# Patient Record
Sex: MALE | Race: OTHER | Employment: OTHER | URBAN - METROPOLITAN AREA
[De-identification: names, ages, dates, MRNs, and addresses within clinical notes are randomized per-mention and may not be internally consistent; named-entity substitution may affect disease eponyms.]

---

## 2017-01-16 ENCOUNTER — ANESTHESIA EVENT (OUTPATIENT)
Dept: SURGERY | Facility: HOSPITAL | Age: 52
DRG: 460 | End: 2017-01-16
Payer: COMMERCIAL

## 2017-01-17 ENCOUNTER — APPOINTMENT (OUTPATIENT)
Dept: GENERAL RADIOLOGY | Facility: HOSPITAL | Age: 52
DRG: 460 | End: 2017-01-17
Attending: ORTHOPAEDIC SURGERY
Payer: COMMERCIAL

## 2017-01-17 ENCOUNTER — SURGERY (OUTPATIENT)
Age: 52
End: 2017-01-17

## 2017-01-17 ENCOUNTER — ANESTHESIA (OUTPATIENT)
Dept: SURGERY | Facility: HOSPITAL | Age: 52
DRG: 460 | End: 2017-01-17
Payer: COMMERCIAL

## 2017-01-17 PROBLEM — M53.2X6 LUMBAR SPINE INSTABILITY: Status: ACTIVE | Noted: 2017-01-17

## 2017-01-17 PROCEDURE — 76001 XR OR LUMBAR SPINE (2 VIEWS) WITH >60 MIN C-ARM  (CPT=76001/72100): CPT

## 2017-01-17 PROCEDURE — 72100 X-RAY EXAM L-S SPINE 2/3 VWS: CPT

## 2017-01-17 RX ORDER — MIDAZOLAM HYDROCHLORIDE 1 MG/ML
INJECTION INTRAMUSCULAR; INTRAVENOUS AS NEEDED
Status: DISCONTINUED | OUTPATIENT
Start: 2017-01-17 | End: 2017-01-17 | Stop reason: SURG

## 2017-01-17 RX ORDER — MINERAL OIL AND PETROLATUM 150; 830 MG/G; MG/G
OINTMENT OPHTHALMIC AS NEEDED
Status: DISCONTINUED | OUTPATIENT
Start: 2017-01-17 | End: 2017-01-17 | Stop reason: SURG

## 2017-01-17 RX ORDER — GLYCOPYRROLATE 0.2 MG/ML
INJECTION INTRAMUSCULAR; INTRAVENOUS AS NEEDED
Status: DISCONTINUED | OUTPATIENT
Start: 2017-01-17 | End: 2017-01-17 | Stop reason: SURG

## 2017-01-17 RX ORDER — DEXAMETHASONE SODIUM PHOSPHATE 4 MG/ML
VIAL (ML) INJECTION AS NEEDED
Status: DISCONTINUED | OUTPATIENT
Start: 2017-01-17 | End: 2017-01-17 | Stop reason: SURG

## 2017-01-17 RX ORDER — ONDANSETRON 2 MG/ML
INJECTION INTRAMUSCULAR; INTRAVENOUS AS NEEDED
Status: DISCONTINUED | OUTPATIENT
Start: 2017-01-17 | End: 2017-01-17 | Stop reason: SURG

## 2017-01-17 RX ORDER — NEOSTIGMINE METHYLSULFATE 0.5 MG/ML
INJECTION INTRAVENOUS AS NEEDED
Status: DISCONTINUED | OUTPATIENT
Start: 2017-01-17 | End: 2017-01-17 | Stop reason: SURG

## 2017-01-17 RX ORDER — SUCCINYLCHOLINE CHLORIDE 20 MG/ML
INJECTION INTRAMUSCULAR; INTRAVENOUS AS NEEDED
Status: DISCONTINUED | OUTPATIENT
Start: 2017-01-17 | End: 2017-01-17 | Stop reason: SURG

## 2017-01-17 RX ORDER — PHENYLEPHRINE HCL 10 MG/ML
VIAL (ML) INJECTION AS NEEDED
Status: DISCONTINUED | OUTPATIENT
Start: 2017-01-17 | End: 2017-01-17 | Stop reason: SURG

## 2017-01-17 RX ORDER — SODIUM CHLORIDE 9 MG/ML
INJECTION, SOLUTION INTRAVENOUS CONTINUOUS PRN
Status: DISCONTINUED | OUTPATIENT
Start: 2017-01-17 | End: 2017-01-17 | Stop reason: SURG

## 2017-01-17 RX ORDER — LIDOCAINE HYDROCHLORIDE 10 MG/ML
INJECTION, SOLUTION EPIDURAL; INFILTRATION; INTRACAUDAL; PERINEURAL AS NEEDED
Status: DISCONTINUED | OUTPATIENT
Start: 2017-01-17 | End: 2017-01-17 | Stop reason: SURG

## 2017-01-17 RX ORDER — ROCURONIUM BROMIDE 10 MG/ML
INJECTION, SOLUTION INTRAVENOUS AS NEEDED
Status: DISCONTINUED | OUTPATIENT
Start: 2017-01-17 | End: 2017-01-17 | Stop reason: SURG

## 2017-01-17 RX ADMIN — SODIUM CHLORIDE: 9 INJECTION, SOLUTION INTRAVENOUS at 09:45:00

## 2017-01-17 RX ADMIN — ROCURONIUM BROMIDE 10 MG: 10 INJECTION, SOLUTION INTRAVENOUS at 07:40:00

## 2017-01-17 RX ADMIN — ROCURONIUM BROMIDE 10 MG: 10 INJECTION, SOLUTION INTRAVENOUS at 09:25:00

## 2017-01-17 RX ADMIN — SODIUM CHLORIDE, SODIUM LACTATE, POTASSIUM CHLORIDE, CALCIUM CHLORIDE: 600; 310; 30; 20 INJECTION, SOLUTION INTRAVENOUS at 07:34:00

## 2017-01-17 RX ADMIN — SUCCINYLCHOLINE CHLORIDE 120 MG: 20 INJECTION INTRAMUSCULAR; INTRAVENOUS at 07:41:00

## 2017-01-17 RX ADMIN — DEXAMETHASONE SODIUM PHOSPHATE 4 MG: 4 MG/ML VIAL (ML) INJECTION at 07:48:00

## 2017-01-17 RX ADMIN — GLYCOPYRROLATE 0.95 MG: 0.2 INJECTION INTRAMUSCULAR; INTRAVENOUS at 09:49:00

## 2017-01-17 RX ADMIN — SODIUM CHLORIDE: 9 INJECTION, SOLUTION INTRAVENOUS at 07:47:00

## 2017-01-17 RX ADMIN — ROCURONIUM BROMIDE 30 MG: 10 INJECTION, SOLUTION INTRAVENOUS at 07:48:00

## 2017-01-17 RX ADMIN — MIDAZOLAM HYDROCHLORIDE 2 MG: 1 INJECTION INTRAMUSCULAR; INTRAVENOUS at 07:34:00

## 2017-01-17 RX ADMIN — SODIUM CHLORIDE, SODIUM LACTATE, POTASSIUM CHLORIDE, CALCIUM CHLORIDE: 600; 310; 30; 20 INJECTION, SOLUTION INTRAVENOUS at 09:57:00

## 2017-01-17 RX ADMIN — MINERAL OIL AND PETROLATUM 1 APPLICATION: 150; 830 OINTMENT OPHTHALMIC at 07:40:00

## 2017-01-17 RX ADMIN — ONDANSETRON 4 MG: 2 INJECTION INTRAMUSCULAR; INTRAVENOUS at 09:50:00

## 2017-01-17 RX ADMIN — LIDOCAINE HYDROCHLORIDE 50 MG: 10 INJECTION, SOLUTION EPIDURAL; INFILTRATION; INTRACAUDAL; PERINEURAL at 07:40:00

## 2017-01-17 RX ADMIN — GLYCOPYRROLATE 0.2 MG: 0.2 INJECTION INTRAMUSCULAR; INTRAVENOUS at 07:34:00

## 2017-01-17 RX ADMIN — SODIUM CHLORIDE: 9 INJECTION, SOLUTION INTRAVENOUS at 09:15:00

## 2017-01-17 RX ADMIN — SODIUM CHLORIDE, SODIUM LACTATE, POTASSIUM CHLORIDE, CALCIUM CHLORIDE: 600; 310; 30; 20 INJECTION, SOLUTION INTRAVENOUS at 09:15:00

## 2017-01-17 RX ADMIN — SODIUM CHLORIDE, SODIUM LACTATE, POTASSIUM CHLORIDE, CALCIUM CHLORIDE: 600; 310; 30; 20 INJECTION, SOLUTION INTRAVENOUS at 09:56:00

## 2017-01-17 RX ADMIN — PHENYLEPHRINE HCL 100 MCG: 10 MG/ML VIAL (ML) INJECTION at 09:25:00

## 2017-01-17 RX ADMIN — NEOSTIGMINE METHYLSULFATE 4.8 MG: 0.5 INJECTION INTRAVENOUS at 09:49:00

## 2017-01-17 NOTE — ANESTHESIA PREPROCEDURE EVALUATION
Anesthesia PreOp Note    HPI:     Rajinder Boateng is a 54year old male who presents for preoperative consultation requested by: Oscar Page MD    Date of Surgery: 1/17/2017    Procedure(s):  POSTERIOR LUMBAR LAMINECT SPINAL FUS W/INSTR 1 LEV  Indication: Potassium-HCTZ 100-25 MG Oral Tab TAKE ONE TABLET BY MOUTH ONCE DAILY Disp: 30 tablet Rfl: 5 1/16/2017 at Unknown time   Atorvastatin Calcium 20 MG Oral Tab TAKE ONE TABLET BY MOUTH ONCE DAILY Disp: 90 tablet Rfl: 1 1/16/2017 at Unknown time   Glucose Bloo 12/29/2016   MCV 88.9 12/29/2016   MCH 29.8 12/29/2016   MCHC 33.5 12/29/2016   RDW 13.4 12/29/2016    12/29/2016   MPV 8.6 12/29/2016       Lab Results  Component Value Date   * 12/29/2016   K 4.1 12/29/2016    12/29/2016   CO2 26 12/29 planned.   Jaziel CRENSHAW  1/17/2017 8:08 AM

## 2017-01-17 NOTE — ANESTHESIA POSTPROCEDURE EVALUATION
Patient: Severino Mazariegos    Procedure Summary     Date Anesthesia Start Anesthesia Stop Room / Location    01/17/17 0734 1025 300 [facility] MAIN OR 09 / 300 [facility] MAIN OR       Procedure Diagnosis Surgeon Responsible Provider    POSTERIOR LUMBAR LAMINECT SPINAL FUS W/INSTR 1

## 2017-01-18 ENCOUNTER — APPOINTMENT (OUTPATIENT)
Dept: PHYSICAL THERAPY | Facility: HOSPITAL | Age: 52
DRG: 460 | End: 2017-01-18
Attending: ORTHOPAEDIC SURGERY
Payer: COMMERCIAL

## 2017-01-18 ENCOUNTER — APPOINTMENT (OUTPATIENT)
Dept: GENERAL RADIOLOGY | Facility: HOSPITAL | Age: 52
DRG: 460 | End: 2017-01-18
Attending: ORTHOPAEDIC SURGERY
Payer: COMMERCIAL

## 2017-01-18 PROCEDURE — 72100 X-RAY EXAM L-S SPINE 2/3 VWS: CPT

## 2017-01-23 ENCOUNTER — TELEPHONE (OUTPATIENT)
Dept: CARDIOLOGY UNIT | Facility: HOSPITAL | Age: 52
End: 2017-01-23

## 2017-11-03 ENCOUNTER — HOSPITAL ENCOUNTER (OUTPATIENT)
Dept: GENERAL RADIOLOGY | Age: 52
Discharge: HOME | End: 2017-11-03
Attending: INTERNAL MEDICINE
Payer: COMMERCIAL

## 2017-11-03 DIAGNOSIS — M54.12 CERVICAL RADICULOPATHY: ICD-10-CM

## 2017-11-03 PROCEDURE — 72040 X-RAY EXAM NECK SPINE 2-3 VW: CPT | Performed by: INTERNAL MEDICINE

## 2020-08-26 PROCEDURE — 82306 VITAMIN D 25 HYDROXY: CPT | Performed by: INTERNAL MEDICINE

## 2020-08-26 PROCEDURE — 82550 ASSAY OF CK (CPK): CPT | Performed by: INTERNAL MEDICINE

## 2020-08-26 PROCEDURE — 82043 UR ALBUMIN QUANTITATIVE: CPT | Performed by: INTERNAL MEDICINE

## 2020-08-26 PROCEDURE — 83036 HEMOGLOBIN GLYCOSYLATED A1C: CPT | Performed by: INTERNAL MEDICINE

## 2020-08-26 PROCEDURE — 80061 LIPID PANEL: CPT | Performed by: INTERNAL MEDICINE

## 2020-08-26 PROCEDURE — 80053 COMPREHEN METABOLIC PANEL: CPT | Performed by: INTERNAL MEDICINE

## 2020-08-26 PROCEDURE — 82570 ASSAY OF URINE CREATININE: CPT | Performed by: INTERNAL MEDICINE

## 2020-08-26 PROCEDURE — 84443 ASSAY THYROID STIM HORMONE: CPT | Performed by: INTERNAL MEDICINE

## 2020-08-26 PROCEDURE — 85025 COMPLETE CBC W/AUTO DIFF WBC: CPT | Performed by: INTERNAL MEDICINE

## 2020-11-30 ENCOUNTER — HOSPITAL ENCOUNTER (OUTPATIENT)
Dept: ULTRASOUND IMAGING | Age: 55
Discharge: HOME | End: 2020-11-30
Attending: INTERNAL MEDICINE
Payer: MEDICARE

## 2020-11-30 ENCOUNTER — LAB ENCOUNTER (OUTPATIENT)
Dept: LAB | Age: 55
End: 2020-11-30
Attending: INTERNAL MEDICINE
Payer: MEDICARE

## 2020-11-30 DIAGNOSIS — N18.4 CHRONIC KIDNEY DISEASE, STAGE IV (SEVERE) (HCC): ICD-10-CM

## 2020-11-30 DIAGNOSIS — I10 HYPERTENSION, UNSPECIFIED TYPE: ICD-10-CM

## 2020-11-30 DIAGNOSIS — E11.21 DIABETIC NEPHROPATHY ASSOCIATED WITH TYPE 2 DIABETES MELLITUS (HCC): ICD-10-CM

## 2020-11-30 DIAGNOSIS — E78.5 HYPERLIPIDEMIA, UNSPECIFIED HYPERLIPIDEMIA TYPE: ICD-10-CM

## 2020-11-30 PROCEDURE — 76775 US EXAM ABDO BACK WALL LIM: CPT | Performed by: INTERNAL MEDICINE

## 2020-11-30 PROCEDURE — 80069 RENAL FUNCTION PANEL: CPT

## 2020-11-30 PROCEDURE — 85018 HEMOGLOBIN: CPT

## 2020-11-30 PROCEDURE — 81001 URINALYSIS AUTO W/SCOPE: CPT

## 2020-11-30 PROCEDURE — 36415 COLL VENOUS BLD VENIPUNCTURE: CPT

## 2020-11-30 PROCEDURE — 84156 ASSAY OF PROTEIN URINE: CPT

## 2020-11-30 PROCEDURE — 82306 VITAMIN D 25 HYDROXY: CPT

## 2020-11-30 PROCEDURE — 85014 HEMATOCRIT: CPT

## 2020-11-30 PROCEDURE — 83970 ASSAY OF PARATHORMONE: CPT

## 2020-11-30 PROCEDURE — 82570 ASSAY OF URINE CREATININE: CPT

## 2021-01-13 ENCOUNTER — LAB ENCOUNTER (OUTPATIENT)
Dept: LAB | Age: 56
End: 2021-01-13
Attending: INTERNAL MEDICINE
Payer: MEDICARE

## 2021-01-13 DIAGNOSIS — N18.4 CHRONIC KIDNEY DISEASE, STAGE IV (SEVERE) (HCC): ICD-10-CM

## 2021-01-13 LAB
ALBUMIN SERPL-MCNC: 3.3 G/DL (ref 3.4–5)
ANION GAP SERPL CALC-SCNC: 7 MMOL/L (ref 0–18)
BUN BLD-MCNC: 40 MG/DL (ref 7–18)
BUN/CREAT SERPL: 10.8 (ref 10–20)
CALCIUM BLD-MCNC: 8.3 MG/DL (ref 8.5–10.1)
CHLORIDE SERPL-SCNC: 111 MMOL/L (ref 98–112)
CO2 SERPL-SCNC: 22 MMOL/L (ref 21–32)
CREAT BLD-MCNC: 3.71 MG/DL
GLUCOSE BLD-MCNC: 157 MG/DL (ref 70–99)
HCT VFR BLD AUTO: 39.5 %
HGB BLD-MCNC: 13.1 G/DL
OSMOLALITY SERPL CALC.SUM OF ELEC: 303 MOSM/KG (ref 275–295)
PHOSPHATE SERPL-MCNC: 5.4 MG/DL (ref 2.5–4.9)
POTASSIUM SERPL-SCNC: 3.8 MMOL/L (ref 3.5–5.1)
SODIUM SERPL-SCNC: 140 MMOL/L (ref 136–145)

## 2021-01-13 PROCEDURE — 85014 HEMATOCRIT: CPT

## 2021-01-13 PROCEDURE — 80069 RENAL FUNCTION PANEL: CPT

## 2021-01-13 PROCEDURE — 36415 COLL VENOUS BLD VENIPUNCTURE: CPT

## 2021-01-13 PROCEDURE — 85018 HEMOGLOBIN: CPT

## 2021-02-05 DIAGNOSIS — Z23 NEED FOR VACCINATION: ICD-10-CM

## 2021-02-07 ENCOUNTER — IMMUNIZATION (OUTPATIENT)
Dept: LAB | Age: 56
End: 2021-02-07
Attending: HOSPITALIST
Payer: MEDICARE

## 2021-02-07 DIAGNOSIS — Z23 NEED FOR VACCINATION: Primary | ICD-10-CM

## 2021-02-07 PROCEDURE — 0001A SARSCOV2 VAC 30MCG/0.3ML IM: CPT

## 2021-02-24 ENCOUNTER — LAB ENCOUNTER (OUTPATIENT)
Dept: LAB | Age: 56
End: 2021-02-24
Attending: INTERNAL MEDICINE
Payer: MEDICARE

## 2021-02-24 DIAGNOSIS — N18.4 CHRONIC KIDNEY DISEASE, STAGE IV (SEVERE) (HCC): ICD-10-CM

## 2021-02-24 LAB
ALBUMIN SERPL-MCNC: 3.3 G/DL (ref 3.4–5)
ANION GAP SERPL CALC-SCNC: 9 MMOL/L (ref 0–18)
BUN BLD-MCNC: 37 MG/DL (ref 7–18)
BUN/CREAT SERPL: 9.8 (ref 10–20)
CALCIUM BLD-MCNC: 8.5 MG/DL (ref 8.5–10.1)
CHLORIDE SERPL-SCNC: 113 MMOL/L (ref 98–112)
CO2 SERPL-SCNC: 20 MMOL/L (ref 21–32)
CREAT BLD-MCNC: 3.77 MG/DL
GLUCOSE BLD-MCNC: 95 MG/DL (ref 70–99)
OSMOLALITY SERPL CALC.SUM OF ELEC: 302 MOSM/KG (ref 275–295)
PHOSPHATE SERPL-MCNC: 5 MG/DL (ref 2.5–4.9)
POTASSIUM SERPL-SCNC: 3.7 MMOL/L (ref 3.5–5.1)
SODIUM SERPL-SCNC: 142 MMOL/L (ref 136–145)

## 2021-02-24 PROCEDURE — 36415 COLL VENOUS BLD VENIPUNCTURE: CPT

## 2021-02-24 PROCEDURE — 80069 RENAL FUNCTION PANEL: CPT

## 2021-02-28 ENCOUNTER — IMMUNIZATION (OUTPATIENT)
Dept: LAB | Age: 56
End: 2021-02-28
Attending: HOSPITALIST
Payer: MEDICARE

## 2021-02-28 DIAGNOSIS — Z23 NEED FOR VACCINATION: Primary | ICD-10-CM

## 2021-02-28 PROCEDURE — 0002A SARSCOV2 VAC 30MCG/0.3ML IM: CPT

## 2021-04-08 ENCOUNTER — LAB ENCOUNTER (OUTPATIENT)
Dept: LAB | Age: 56
End: 2021-04-08
Attending: INTERNAL MEDICINE
Payer: MEDICARE

## 2021-04-08 DIAGNOSIS — E11.21 DIABETIC NEPHROPATHY ASSOCIATED WITH TYPE 2 DIABETES MELLITUS (HCC): ICD-10-CM

## 2021-04-08 DIAGNOSIS — E78.5 HYPERLIPIDEMIA, UNSPECIFIED HYPERLIPIDEMIA TYPE: ICD-10-CM

## 2021-04-08 DIAGNOSIS — N18.4 CHRONIC KIDNEY DISEASE, STAGE IV (SEVERE) (HCC): ICD-10-CM

## 2021-04-08 DIAGNOSIS — I10 ESSENTIAL HYPERTENSION, BENIGN: ICD-10-CM

## 2021-04-08 PROCEDURE — 80069 RENAL FUNCTION PANEL: CPT

## 2021-04-08 PROCEDURE — 36415 COLL VENOUS BLD VENIPUNCTURE: CPT

## 2021-05-06 ENCOUNTER — LAB ENCOUNTER (OUTPATIENT)
Dept: LAB | Age: 56
End: 2021-05-06
Attending: INTERNAL MEDICINE
Payer: MEDICARE

## 2021-05-06 DIAGNOSIS — E11.21 DIABETIC NEPHROPATHY ASSOCIATED WITH TYPE 2 DIABETES MELLITUS (HCC): ICD-10-CM

## 2021-05-06 DIAGNOSIS — N25.81 SECONDARY HYPERPARATHYROIDISM OF RENAL ORIGIN (HCC): ICD-10-CM

## 2021-05-06 DIAGNOSIS — D63.1 ANEMIA OF CHRONIC RENAL FAILURE, STAGE 4 (SEVERE) (HCC): ICD-10-CM

## 2021-05-06 DIAGNOSIS — N18.4 CHRONIC KIDNEY DISEASE, STAGE IV (SEVERE) (HCC): ICD-10-CM

## 2021-05-06 DIAGNOSIS — N18.4 ANEMIA OF CHRONIC RENAL FAILURE, STAGE 4 (SEVERE) (HCC): ICD-10-CM

## 2021-05-06 DIAGNOSIS — E78.5 HYPERLIPIDEMIA, UNSPECIFIED HYPERLIPIDEMIA TYPE: ICD-10-CM

## 2021-05-06 DIAGNOSIS — I10 ESSENTIAL HYPERTENSION: ICD-10-CM

## 2021-05-06 PROCEDURE — 85014 HEMATOCRIT: CPT

## 2021-05-06 PROCEDURE — 36415 COLL VENOUS BLD VENIPUNCTURE: CPT

## 2021-05-06 PROCEDURE — 85018 HEMOGLOBIN: CPT

## 2021-05-06 PROCEDURE — 80069 RENAL FUNCTION PANEL: CPT

## 2021-07-09 ENCOUNTER — LAB ENCOUNTER (OUTPATIENT)
Dept: LAB | Age: 56
End: 2021-07-09
Attending: INTERNAL MEDICINE
Payer: MEDICARE

## 2021-07-09 DIAGNOSIS — D64.9 ANEMIA, UNSPECIFIED TYPE: ICD-10-CM

## 2021-07-09 DIAGNOSIS — N18.4 CHRONIC KIDNEY DISEASE, STAGE IV (SEVERE) (HCC): ICD-10-CM

## 2021-07-09 LAB
ALBUMIN SERPL-MCNC: 3.6 G/DL (ref 3.4–5)
ANION GAP SERPL CALC-SCNC: 6 MMOL/L (ref 0–18)
BUN BLD-MCNC: 47 MG/DL (ref 7–18)
BUN/CREAT SERPL: 11.9 (ref 10–20)
CALCIUM BLD-MCNC: 8.3 MG/DL (ref 8.5–10.1)
CHLORIDE SERPL-SCNC: 118 MMOL/L (ref 98–112)
CO2 SERPL-SCNC: 18 MMOL/L (ref 21–32)
CREAT BLD-MCNC: 3.96 MG/DL
GLUCOSE BLD-MCNC: 88 MG/DL (ref 70–99)
HCT VFR BLD AUTO: 36.1 %
HGB BLD-MCNC: 11.7 G/DL
OSMOLALITY SERPL CALC.SUM OF ELEC: 306 MOSM/KG (ref 275–295)
PHOSPHATE SERPL-MCNC: 5.4 MG/DL (ref 2.5–4.9)
POTASSIUM SERPL-SCNC: 3.9 MMOL/L (ref 3.5–5.1)
PTH-INTACT SERPL-MCNC: 106.1 PG/ML (ref 18.5–88)
SODIUM SERPL-SCNC: 142 MMOL/L (ref 136–145)

## 2021-07-09 PROCEDURE — 80069 RENAL FUNCTION PANEL: CPT

## 2021-07-09 PROCEDURE — 85014 HEMATOCRIT: CPT

## 2021-07-09 PROCEDURE — 85018 HEMOGLOBIN: CPT

## 2021-07-09 PROCEDURE — 83970 ASSAY OF PARATHORMONE: CPT

## 2021-07-09 PROCEDURE — 36415 COLL VENOUS BLD VENIPUNCTURE: CPT

## 2021-08-23 ENCOUNTER — LAB ENCOUNTER (OUTPATIENT)
Dept: LAB | Age: 56
End: 2021-08-23
Attending: INTERNAL MEDICINE
Payer: MEDICARE

## 2021-08-23 DIAGNOSIS — I12.9 HYPERTENSIVE CHRONIC KIDNEY DISEASE, UNSPECIFIED CKD STAGE: ICD-10-CM

## 2021-08-23 DIAGNOSIS — D63.1 ANEMIA IN CHRONIC KIDNEY DISEASE, UNSPECIFIED CKD STAGE: ICD-10-CM

## 2021-08-23 DIAGNOSIS — E11.21 TYPE II DIABETES MELLITUS WITH NEPHROPATHY (HCC): ICD-10-CM

## 2021-08-23 DIAGNOSIS — N25.81 SECONDARY HYPERPARATHYROIDISM OF RENAL ORIGIN (HCC): ICD-10-CM

## 2021-08-23 DIAGNOSIS — N18.4 CHRONIC RENAL DISEASE, STAGE IV (HCC): ICD-10-CM

## 2021-08-23 DIAGNOSIS — E87.2 ACIDOSIS: ICD-10-CM

## 2021-08-23 DIAGNOSIS — N18.4 CHRONIC KIDNEY DISEASE, STAGE IV (SEVERE) (HCC): ICD-10-CM

## 2021-08-23 DIAGNOSIS — N18.9 ANEMIA IN CHRONIC KIDNEY DISEASE, UNSPECIFIED CKD STAGE: ICD-10-CM

## 2021-08-23 DIAGNOSIS — E78.5 HYPERLIPIDEMIA, UNSPECIFIED HYPERLIPIDEMIA TYPE: ICD-10-CM

## 2021-08-23 LAB
ALBUMIN SERPL-MCNC: 3.7 G/DL (ref 3.4–5)
ANION GAP SERPL CALC-SCNC: 7 MMOL/L (ref 0–18)
BUN BLD-MCNC: 45 MG/DL (ref 7–18)
CALCIUM BLD-MCNC: 8.3 MG/DL (ref 8.5–10.1)
CHLORIDE SERPL-SCNC: 117 MMOL/L (ref 98–112)
CO2 SERPL-SCNC: 19 MMOL/L (ref 21–32)
CREAT BLD-MCNC: 4.32 MG/DL
GLUCOSE BLD-MCNC: 81 MG/DL (ref 70–99)
HCT VFR BLD AUTO: 36 %
HGB BLD-MCNC: 11.4 G/DL
OSMOLALITY SERPL CALC.SUM OF ELEC: 307 MOSM/KG (ref 275–295)
PHOSPHATE SERPL-MCNC: 4.9 MG/DL (ref 2.5–4.9)
POTASSIUM SERPL-SCNC: 3.9 MMOL/L (ref 3.5–5.1)
SODIUM SERPL-SCNC: 143 MMOL/L (ref 136–145)

## 2021-08-23 PROCEDURE — 36415 COLL VENOUS BLD VENIPUNCTURE: CPT

## 2021-08-23 PROCEDURE — 85018 HEMOGLOBIN: CPT

## 2021-08-23 PROCEDURE — 85014 HEMATOCRIT: CPT

## 2021-08-23 PROCEDURE — 80069 RENAL FUNCTION PANEL: CPT

## 2021-08-26 PROBLEM — H40.9 GLAUCOMA: Status: ACTIVE | Noted: 2021-08-12

## 2021-08-26 PROBLEM — N18.9 CHRONIC KIDNEY DISEASE: Status: ACTIVE | Noted: 2021-07-28

## 2021-08-26 PROBLEM — M54.9 BACK PAIN: Status: ACTIVE | Noted: 2021-08-12

## 2021-10-15 ENCOUNTER — HOSPITAL ENCOUNTER (INPATIENT)
Facility: HOSPITAL | Age: 56
LOS: 2 days | Discharge: HOME | DRG: 291 | End: 2021-10-17
Attending: EMERGENCY MEDICINE | Admitting: INTERNAL MEDICINE
Payer: MEDICARE

## 2021-10-15 ENCOUNTER — APPOINTMENT (OUTPATIENT)
Dept: GENERAL RADIOLOGY | Facility: HOSPITAL | Age: 56
DRG: 291 | End: 2021-10-15
Attending: EMERGENCY MEDICINE
Payer: MEDICARE

## 2021-10-15 ENCOUNTER — APPOINTMENT (OUTPATIENT)
Dept: NUCLEAR MEDICINE | Facility: HOSPITAL | Age: 56
DRG: 291 | End: 2021-10-15
Attending: EMERGENCY MEDICINE
Payer: MEDICARE

## 2021-10-15 ENCOUNTER — LAB ENCOUNTER (OUTPATIENT)
Dept: LAB | Age: 56
End: 2021-10-15
Attending: INTERNAL MEDICINE
Payer: MEDICARE

## 2021-10-15 DIAGNOSIS — D63.1 ANEMIA IN CHRONIC KIDNEY DISEASE, UNSPECIFIED CKD STAGE: ICD-10-CM

## 2021-10-15 DIAGNOSIS — I12.0 HYPERTENSIVE KIDNEY DISEASE WITH CHRONIC KIDNEY DISEASE STAGE V (HCC): ICD-10-CM

## 2021-10-15 DIAGNOSIS — N25.81 SECONDARY HYPERPARATHYROIDISM OF RENAL ORIGIN (HCC): ICD-10-CM

## 2021-10-15 DIAGNOSIS — N18.5 HYPERTENSIVE KIDNEY DISEASE WITH CHRONIC KIDNEY DISEASE STAGE V (HCC): ICD-10-CM

## 2021-10-15 DIAGNOSIS — E11.21 DIABETES MELLITUS WITH NEPHROPATHY (HCC): ICD-10-CM

## 2021-10-15 DIAGNOSIS — Z95.1 HX OF CABG: ICD-10-CM

## 2021-10-15 DIAGNOSIS — N18.9 ANEMIA IN CHRONIC KIDNEY DISEASE, UNSPECIFIED CKD STAGE: ICD-10-CM

## 2021-10-15 DIAGNOSIS — J90 PLEURAL EFFUSION: Primary | ICD-10-CM

## 2021-10-15 DIAGNOSIS — N18.5 CHRONIC KIDNEY DISEASE, STAGE V (HCC): ICD-10-CM

## 2021-10-15 DIAGNOSIS — E78.5 HYPERLIPIDEMIA, UNSPECIFIED HYPERLIPIDEMIA TYPE: ICD-10-CM

## 2021-10-15 PROCEDURE — 71045 X-RAY EXAM CHEST 1 VIEW: CPT | Performed by: EMERGENCY MEDICINE

## 2021-10-15 PROCEDURE — 78582 LUNG VENTILAT&PERFUS IMAGING: CPT | Performed by: EMERGENCY MEDICINE

## 2021-10-15 PROCEDURE — 99222 1ST HOSP IP/OBS MODERATE 55: CPT | Performed by: INTERNAL MEDICINE

## 2021-10-15 RX ORDER — ASPIRIN 81 MG/1
81 TABLET ORAL DAILY
Status: DISCONTINUED | OUTPATIENT
Start: 2021-10-15 | End: 2021-10-17

## 2021-10-15 RX ORDER — CLOPIDOGREL BISULFATE 75 MG/1
75 TABLET ORAL DAILY
Status: DISCONTINUED | OUTPATIENT
Start: 2021-10-15 | End: 2021-10-17

## 2021-10-15 RX ORDER — CLOPIDOGREL BISULFATE 75 MG/1
75 TABLET ORAL DAILY
COMMUNITY

## 2021-10-15 RX ORDER — METOPROLOL SUCCINATE 50 MG/1
50 TABLET, EXTENDED RELEASE ORAL
Status: DISCONTINUED | OUTPATIENT
Start: 2021-10-16 | End: 2021-10-16

## 2021-10-15 RX ORDER — DOXYCYCLINE HYCLATE 100 MG/1
100 CAPSULE ORAL 2 TIMES DAILY
COMMUNITY
End: 2021-11-05 | Stop reason: ALTCHOICE

## 2021-10-15 RX ORDER — ROSUVASTATIN CALCIUM 10 MG/1
10 TABLET, COATED ORAL NIGHTLY
Status: DISCONTINUED | OUTPATIENT
Start: 2021-10-15 | End: 2021-10-17

## 2021-10-15 RX ORDER — BUMETANIDE 0.25 MG/ML
1 INJECTION, SOLUTION INTRAMUSCULAR; INTRAVENOUS
Status: DISCONTINUED | OUTPATIENT
Start: 2021-10-15 | End: 2021-10-17

## 2021-10-15 RX ORDER — DOCUSATE SODIUM 100 MG/1
100 CAPSULE, LIQUID FILLED ORAL 2 TIMES DAILY
COMMUNITY

## 2021-10-15 RX ORDER — DOXYCYCLINE HYCLATE 100 MG/1
100 CAPSULE ORAL EVERY 12 HOURS SCHEDULED
Status: DISCONTINUED | OUTPATIENT
Start: 2021-10-15 | End: 2021-10-17

## 2021-10-15 RX ORDER — ENOXAPARIN SODIUM 100 MG/ML
80 INJECTION SUBCUTANEOUS ONCE
Status: COMPLETED | OUTPATIENT
Start: 2021-10-15 | End: 2021-10-15

## 2021-10-15 NOTE — ED PROVIDER NOTES
Patient Seen in: [facility] AND [facility] Emergency Department    History   Patient presents with:  Swelling Edema  Difficulty Breathing      HPI    61-year-old male presents the ER with complaint of shortness of breath.   Patient states he been having shortness o ED Triage Vitals [10/15/21 1344]   /80   Pulse 108   Resp 25   Temp 97.8 °F (36.6 °C)   Temp src    SpO2 100 %   O2 Device None (Room air)       All measures to prevent infection transmission during my interaction with the patient were taken.  The Labs Reviewed   BASIC METABOLIC PANEL (8) - Abnormal; Notable for the following components:       Result Value    Glucose 251 (*)     Chloride 114 (*)     CO2 18.0 (*)     BUN 49 (*)     Creatinine 4.11 (*)     Calcium, Total 8.4 (*)     Calculated Osmol in time range)         MDM      10/15/21  1344 10/15/21  1400 10/15/21  1430   BP: 145/80 (!) 163/97 144/88   Pulse: 108 106 106   Resp: 25 26 22   Temp: 97.8 °F (36.6 °C)     SpO2: 100% 100% 99%   Weight: 88.5 kg       *I personally reviewed and interpret Admission  Date Reviewed: 8/26/2021          ICD-10-CM Noted POA    * (Principal) Pleural effusion J90 10/15/2021 Unknown

## 2021-10-15 NOTE — CONSULTS
[facility] - [facility]    Report of Consultation    Kemal Goff Patient Status:  Emergency    1965 MRN [id number]   Location [address] Attending Leatha Echeverria,    Hosp Day # 0 PCP Holland Best MD     Date of mg, Subcutaneous, Once      (Not in a hospital admission)      Allergies  No Known Allergies    Review of Systems:   Constitutional: denies fevers, chills, weakness, fatigue, recent illness  HEENT: denies headache, sore throat, vision loss  Cardio: denies 2. Post coronary artery bypass. No acute CHF. Dictated by (CST): Randal Mercado MD on 10/15/2021 at 2:18 PM     Finalized by (CST): Randal Mercado MD on 10/15/2021 at 2:20 PM            Assessment   1. Dyspnea  2. Status post CABG  3.   Coronary arter

## 2021-10-15 NOTE — ED QUICK NOTES
Orders for admission, patient is aware of plan and ready to go upstairs. LOC:AOx3, ambulatory with assistance. Denies pain.   VQ scan scheduled for this evening    ACCESS:20g L AC PIV    Rapid COVID is negative    Any questions, please call ED RN Cheri Bryant

## 2021-10-15 NOTE — CONSULTS
[address] Patient Status:  Emergency    1965 MRN [id number]   Location [address] Attending Anna Cardoso DO   Hosp Day # 0 PCP Jerusalem MD Caden     Date of Admi compliant with all of his medications. Denies any fevers or chills. Also reports having increasing lower extremity ankle edema. No nausea or vomiting. No diaphoresis.   On presentation was found to have elevated proBNP levels 3571, cardiology was [facility] Exam:   Blood pressure (!) 147/99, pulse 105, temperature 97.8 °F (36.6 °C), resp. rate 17, weight 195 lb (88.5 kg), SpO2 100 %. Scheduled Meds:   • enoxaparin  80 mg Subcutaneous Once         Physical Exam:    General: Alert and oriented.  No apparent d

## 2021-10-15 NOTE — ED INITIAL ASSESSMENT (HPI)
Patient aox3 to ed via private vehicle s/p cardiac bypass at [facility] x 2 weeks ago hx of renal failure.  Patient was told to come to ed for further eval +ble edema noted +sob denies chest pain

## 2021-10-16 ENCOUNTER — APPOINTMENT (OUTPATIENT)
Dept: GENERAL RADIOLOGY | Facility: HOSPITAL | Age: 56
DRG: 291 | End: 2021-10-16
Attending: INTERNAL MEDICINE
Payer: MEDICARE

## 2021-10-16 ENCOUNTER — APPOINTMENT (OUTPATIENT)
Dept: CV DIAGNOSTICS | Facility: HOSPITAL | Age: 56
DRG: 291 | End: 2021-10-16
Attending: INTERNAL MEDICINE
Payer: MEDICARE

## 2021-10-16 ENCOUNTER — APPOINTMENT (OUTPATIENT)
Dept: ULTRASOUND IMAGING | Facility: HOSPITAL | Age: 56
DRG: 291 | End: 2021-10-16
Attending: INTERNAL MEDICINE
Payer: MEDICARE

## 2021-10-16 PROCEDURE — 93306 TTE W/DOPPLER COMPLETE: CPT | Performed by: INTERNAL MEDICINE

## 2021-10-16 PROCEDURE — 71045 X-RAY EXAM CHEST 1 VIEW: CPT | Performed by: INTERNAL MEDICINE

## 2021-10-16 PROCEDURE — 32555 ASPIRATE PLEURA W/ IMAGING: CPT | Performed by: INTERNAL MEDICINE

## 2021-10-16 PROCEDURE — 0W9B3ZZ DRAINAGE OF LEFT PLEURAL CAVITY, PERCUTANEOUS APPROACH: ICD-10-PCS | Performed by: INTERNAL MEDICINE

## 2021-10-16 PROCEDURE — 71046 X-RAY EXAM CHEST 2 VIEWS: CPT | Performed by: INTERNAL MEDICINE

## 2021-10-16 PROCEDURE — 99233 SBSQ HOSP IP/OBS HIGH 50: CPT | Performed by: INTERNAL MEDICINE

## 2021-10-16 RX ORDER — METOPROLOL TARTRATE 5 MG/5ML
5 INJECTION INTRAVENOUS ONCE
Status: COMPLETED | OUTPATIENT
Start: 2021-10-16 | End: 2021-10-16

## 2021-10-16 RX ORDER — HYDRALAZINE HYDROCHLORIDE 25 MG/1
25 TABLET, FILM COATED ORAL EVERY 8 HOURS SCHEDULED
Status: DISCONTINUED | OUTPATIENT
Start: 2021-10-16 | End: 2021-10-17

## 2021-10-16 RX ORDER — METOPROLOL SUCCINATE 50 MG/1
50 TABLET, EXTENDED RELEASE ORAL 2 TIMES DAILY
Status: DISCONTINUED | OUTPATIENT
Start: 2021-10-16 | End: 2021-10-17

## 2021-10-16 RX ORDER — HYDRALAZINE HYDROCHLORIDE 25 MG/1
25 TABLET, FILM COATED ORAL EVERY 8 HOURS SCHEDULED
Qty: 90 TABLET | Refills: 3 | Status: SHIPPED | OUTPATIENT
Start: 2021-10-16

## 2021-10-16 RX ORDER — HYDROCODONE BITARTRATE AND ACETAMINOPHEN 10; 325 MG/1; MG/1
1 TABLET ORAL EVERY 6 HOURS PRN
Status: DISCONTINUED | OUTPATIENT
Start: 2021-10-16 | End: 2021-10-17

## 2021-10-16 RX ORDER — BRIMONIDINE TARTRATE 2 MG/ML
1 SOLUTION/ DROPS OPHTHALMIC EVERY 8 HOURS SCHEDULED
Status: DISCONTINUED | OUTPATIENT
Start: 2021-10-16 | End: 2021-10-17

## 2021-10-16 RX ORDER — TIMOLOL MALEATE 5 MG/ML
1 SOLUTION/ DROPS OPHTHALMIC 2 TIMES DAILY
Status: DISCONTINUED | OUTPATIENT
Start: 2021-10-16 | End: 2021-10-17

## 2021-10-16 RX ORDER — METOPROLOL SUCCINATE 50 MG/1
50 TABLET, EXTENDED RELEASE ORAL 2 TIMES DAILY
Status: DISCONTINUED | OUTPATIENT
Start: 2021-10-16 | End: 2021-10-16

## 2021-10-16 RX ORDER — POTASSIUM CHLORIDE 20 MEQ/1
20 TABLET, EXTENDED RELEASE ORAL ONCE
Status: COMPLETED | OUTPATIENT
Start: 2021-10-16 | End: 2021-10-16

## 2021-10-16 RX ORDER — HYDRALAZINE HYDROCHLORIDE 25 MG/1
25 TABLET, FILM COATED ORAL EVERY 8 HOURS SCHEDULED
Status: DISCONTINUED | OUTPATIENT
Start: 2021-10-16 | End: 2021-10-16

## 2021-10-16 RX ORDER — LATANOPROST 50 UG/ML
1 SOLUTION/ DROPS OPHTHALMIC NIGHTLY
Status: DISCONTINUED | OUTPATIENT
Start: 2021-10-16 | End: 2021-10-17

## 2021-10-16 NOTE — PLAN OF CARE
Reza Frostx4, [place], complaint of SOB and chest pain, CABG Sept 20, plan for VQ scan this evening, episode of hypoglycemia when arrived to unit.       Problem: Patient Centered Care  Goal: Patient preferences are identified and integrated in the patient's plan of monitor vital signs, obtain 12 lead EKG if indicated  - Evaluate effectiveness of antiarrhythmic and heart rate control medications as ordered  - Initiate emergency measures for life threatening arrhythmias  - Monitor electrolytes and administer replacemen

## 2021-10-16 NOTE — PLAN OF CARE
Reza Aox4, up independently, RA, thoracentesis bedside, continue IV bumex. Unable to keep accurate output as pt has been unable to use the urinal consistently. Hat placed in toilet and pt educated on importance of output monitoring.        Problem: Patie stability  Description: INTERVENTIONS:  - Monitor vital signs, rhythm, and trends  - Monitor for bleeding, hypotension and signs of decreased cardiac output  - Evaluate effectiveness of vasoactive medications to optimize hemodynamic stability  - Monitor ar

## 2021-10-16 NOTE — PROGRESS NOTES
10/16/21 1125   Clinical Encounter Type   Visited With Patient and family together   Routine Visit Introduction   Continue Visiting Yes   Referral From Nurse   Referral To [address] Needs Prayer   Spiritual Requests Dur

## 2021-10-16 NOTE — PROGRESS NOTES
[facility]  Report of Consultation    Kae Maurice Patient Status:  Inpatient    1965 MRN [id number]   Location [facility] 3W/SW Attending Tracey Clark MD   Hosp Day # 1 PCP Barb Peralta MD     Reason for Consultation:  Progressive were reviewed and are negative except as described above in HPI. Physical Exam:  Blood pressure (!) 140/94, pulse 109, temperature 98.6 °F (37 °C), temperature source Oral, resp. rate 18, weight 189 lb (85.7 kg), SpO2 98 %.   Temp (24hrs), Av.5 °F (3 #3 Endoscopic harvest of the GSV    CXR: 10/16/21:   1.  Interval worsening in the chest with worsening opacification of the left lung base with increasing moderate-sized left pleural effusion and worsening opacification suggesting possible left basal pne inflammatory markers. Increase Toprol to  50 mg Bid. Hgb stable at 9.6.

## 2021-10-16 NOTE — PLAN OF CARE
Pt is A&Ox4. No acute changes. Plan is to continue to diurese.    Problem: Patient Centered Care  Goal: Patient preferences are identified and integrated in the patient's plan of care  Description: Interventions:  - What would you like us to know as we care shortness of breathe    Interventions:   - medications  - I/o  - See additional Care Plan goals for specific interventions  10/16/2021 0648 by Randy Luna  Outcome: Progressing  10/16/2021 0648 by Randy Luna  Outcome: Progressing  10/16/2021 064

## 2021-10-16 NOTE — PROGRESS NOTES
[facility]    Progress Note    Iglesia Bertrand Patient Status:  Inpatient    1965 MRN [id number]   Location [facility] 3W/SW Attending Bernarda Butler MD   Hosp Day # 1 PCP Helena Xiong MD     Subjective:     Constitutional: lung which appears diminutive. This is consistent with decreased left lung volume on current chest x-ray. 2. Low probability study for pulmonary embolus.     Dictated by (CST): Darrick Rios MD on 10/15/2021 at 7:12 PM     Finalized by (CST): Cortez Flores

## 2021-10-16 NOTE — H&P
[facility]    History and Physical    Marlyse Ends Patient Status:  Inpatient    1965 MRN [id number]   Location [facility] 3W/SW Attending Blessing Blankenship MD   Hosp Day # 1 PCP Radha Hamlin MD     Date:  10/16/2021  Date of tablet by mouth every 6 (six) hours as needed for Pain. ASPIRIN LOW DOSE 81 MG Oral Tab EC, Take 81 mg by mouth daily. diazePAM (VALIUM) 5 MG Oral Tab, Take 1 tablet (5 mg total) by mouth every 6 (six) hours as needed for Anxiety.   mupirocin 2 % External round, and reactive to light. Conjunctivae are normal.   Neck: Neck supple. No JVD present. Cardiovascular: Regular rhythm and normal heart sounds. Exam reveals no friction rub. Tachy in the 110's Edema (trace) present.   Pulmonary/Chest: Effort makayla EKG 12 Lead    Result Date: 10/15/2021  ECG Report  Interpretation  -------------------------- Sinus Tachycardia Low voltage in precordial leads.  - Negative T-waves May be normal -consider acute process.  ABNORMAL When compared with ECG of 12/29/2016

## 2021-10-16 NOTE — PROCEDURES
[facility]  Procedure Note  10/16/21    Travis Asp Patient Status:  Inpatient    1965 MRN [id number]   Location [facility] 3W/SW Attending Idania Key MD   Hosp Day # 1 PCP Judith Avelar MD     Procedure: Left-sided ult

## 2021-10-17 VITALS
OXYGEN SATURATION: 100 % | RESPIRATION RATE: 16 BRPM | DIASTOLIC BLOOD PRESSURE: 84 MMHG | WEIGHT: 183 LBS | SYSTOLIC BLOOD PRESSURE: 127 MMHG | TEMPERATURE: 97 F | HEART RATE: 108 BPM | BODY MASS INDEX: 32 KG/M2

## 2021-10-17 PROCEDURE — 99232 SBSQ HOSP IP/OBS MODERATE 35: CPT | Performed by: INTERNAL MEDICINE

## 2021-10-17 PROCEDURE — 99233 SBSQ HOSP IP/OBS HIGH 50: CPT | Performed by: INTERNAL MEDICINE

## 2021-10-17 RX ORDER — POTASSIUM CHLORIDE 20 MEQ/1
20 TABLET, EXTENDED RELEASE ORAL ONCE
Status: COMPLETED | OUTPATIENT
Start: 2021-10-17 | End: 2021-10-17

## 2021-10-17 RX ORDER — DIAZEPAM 5 MG/1
5 TABLET ORAL EVERY 6 HOURS PRN
Status: DISCONTINUED | OUTPATIENT
Start: 2021-10-17 | End: 2021-10-17

## 2021-10-17 NOTE — PROGRESS NOTES
[facility]     Cardiology Progress Note    Ines Ratliff Patient Status:  Inpatient    1965 MRN [id number]   Location [facility] 3W/SW Attending Chidi Cazares MD   Hosp Day # 2 PCP Rosemarie Negrete MD       SUBJECTIVE:  No card abdominal pain, nausea, vomiting or change in bowel habits. : Denies any difficulty with urination. Neuro:  Denies any headaches, focal weakness or paresthesia. All other systems reviewed and are negative.       Labs:     Recent Labs   Lab 10/15/21 Cardiopulmonary bypass with mild hypothermia. #3 Endoscopic harvest of the GSV     CXR: 10/16/21:   1.  Interval worsening in the chest with worsening opacification of the left lung base with increasing moderate-sized left pleural effusion and worsening o NSAID with CKD. Toprol 50 mg.   · HTN: Controlled. · Dyslipidemia: Crestor 10 mg  · CKD Stage 4: Cr:  4.11  GFR: 15.  Pre transplant  Work up at [facility]. · Anemia: 9.6 post operative stable. Summary:   Remains ST with HR's in the 's.  VQ negative

## 2021-10-17 NOTE — PROGRESS NOTES
[facility]    Progress Note      Assessment and Plan:   1. Postoperative pleural effusion status post large-volume thoracentesis–breathing much better today. Off oxygen.   Chest x-ray looks good postoperatively except for minimal basilar a

## 2021-10-17 NOTE — DISCHARGE SUMMARY
DISCHARGE SUMMARY     Corona Dalton Patient Status:  Inpatient    1965 MRN [id number]   Location [facility] 3W/SW Attending Sarthak Nelson MD   Hosp Day # 2 PCP Kristy Mas MD     Date of Admission: 10/15/2021  Date of Discharge: 10/17/21 taking these medications      Instructions Prescription details   hydrALAZINE 25 MG Tabs  Commonly known as: APRESOLINE      Take 1 tablet (25 mg total) by mouth every 8 (eight) hours.    Quantity: 90 tablet  Refills: 3        CHANGE how you take these medi Quantity: 100 strip  Refills: 5     HYDROcodone-acetaminophen  MG Tabs  Commonly known as: Norco      Take 1 tablet by mouth every 6 (six) hours as needed for Pain.    Quantity: 90 tablet  Refills: 0     metoprolol succinate 50 MG Tb24  Commonly known Tab  TAKE 1 TABLET(10 MG) BY MOUTH EVERY NIGHT    Cholecalciferol 125 MCG (5000 UT) Oral Tab  Take 1 tablet by mouth. acetaZOLAMIDE 250 MG Oral Tab  Take 0.5 tablets by mouth in the morning, at noon, and at bedtime.      SITagliptin Phosphate 25 MG Oral

## 2021-10-17 NOTE — PROGRESS NOTES
[facility]    Progress Note    Beatriz Ruby Patient Status:  Inpatient    1965 MRN [id number]   Location [facility] 3W/SW Attending Luis Arechiga MD   Hosp Day # 2 PCP Becky Liu MD     Subjective:     Constitutional: 10/16/2021 at 9:01 AM     Finalized by (CST): Hans Livingston MD on 10/16/2021 at 9:03 AM          US THORACENTESIS GUIDED LEFT (CPT=32555)    Result Date: 10/16/2021  CONCLUSION:  1. Left pleural effusion. Thoracentesis by Dr. Eduardo Nolan.     Dictated by (CST): changes have occurred Electronically signed on 10/15/2021 at 16:05 by Mariely Parrish MD      Assessment & Plan:      Feels better  all     Pleural effusion/CHF- s/p pleural tap by pulmo,      Admit/ iv bumex/ pulmonary and cardiology consult     CAD/Hx

## 2021-10-17 NOTE — PLAN OF CARE
Pt slept throughout the night. Denies pain. Wife is at bedside. Fall precautions in place, call light within reach.       Problem: Patient Centered Care  Goal: Patient preferences are identified and integrated in the patient's plan of care  Description: Int effectiveness of vasoactive medications to optimize hemodynamic stability  - Monitor arterial and/or venous puncture sites for bleeding and/or hematoma  - Assess quality of pulses, skin color and temperature  - Assess for signs of decreased coronary artery

## 2021-10-18 NOTE — PAYOR COMM NOTE
--------------  ADMISSION REVIEW     [facility]  Subscriber #:  [id number]  Authorization [id number]     ED Provider Notes        History   Patient presents with:  Swelling Edema  Difficulty Breathing    HPI    24-year-old male presents the Palpations: Abdomen is soft. Musculoskeletal:         General: Normal range of motion. Cervical back: Normal range of motion and neck supple. Skin:     General: Skin is warm and dry. Comments: Surgical wound on chest clean dry and intact.    Thony Farmer 80 MG/0.8ML injection 80 mg (has no administration in time range)         MDM      10/15/21  1344 10/15/21  1400 10/15/21  1430   BP: 145/80 (!) 163/97 144/88   Pulse: 108 106 106   Resp: 25 26 22   Temp: 97.8 °F (36.6 °C)     SpO2: 100% 100% 99%   Weight: patient had cardiac bypass surgery. History reviewed.  Initial work up with chf/fluid overload and left pleural effusion with sinus tachycardia    Physical Exam:   Vital Signs:  Blood pressure (!) 140/94, pulse 109, temperature 98.6 °F (37 °C), temperature probability study for pulmonary embolus.     Dictated by (CST): Teresita Samuel MD on 10/15/2021 at 7:12 PM     Finalized by (CST): Teresita Samuel MD on 10/15/2021 at 7:14 PM          EKG 12 Lead    Result Date: 10/15/2021  ECG Report  Inte doing well check 2D echocardiogram with Doppler previous EF 50 to 55% at [facility]. Midline scar healing doing well continue aspirin 81 mg daily and Plavix 75 mg daily. 10/15 PULM    Assessment   1. Dyspnea  2. Status post CABG  3.   Coronary artery dis ultrasound-guided thoracentesis     Pre-Procedure Diagnosis: Pleural effusion     Post-Procedure Diagnosis: 1200 cc of serosanguineous fluid aspirated         10/17 CARDS    Assessment/Plan:  · CAD: 9/28/21: CABG x 3 (LIMA-LAD, SV-D1, SV-OM) Dr. Jenny Santos 10/16/2021     CREATSERUM 4.11 (H) 10/17/2021     BUN 54 (H) 10/17/2021      10/17/2021     K 3.6 10/17/2021      (H) 10/17/2021     CO2 14.0 (L) 10/17/2021      (H) 10/17/2021     CA 8.3 (L) 10/17/2021             MEDICATIONS ADMINISTER 10/17/2021    10/17/2021 0843 Given 1 drop Both Eyes Monica Ayoub, LUISA            10/17/21 0841 97.4 °F (36.3 °C) 115 16 102/69 96 % — None (Room air)       10/16/21 2121 99.1 °F (37.3 °C) 114 20 159/92 Abnormal        10/16/21 0942 98.6 °F (37 °C) 11

## 2021-10-22 ENCOUNTER — TELEPHONE (OUTPATIENT)
Dept: PULMONOLOGY | Facility: CLINIC | Age: 56
End: 2021-10-22

## 2021-10-22 DIAGNOSIS — J90 PLEURAL EFFUSION: Primary | ICD-10-CM

## 2021-10-22 NOTE — TELEPHONE ENCOUNTER
Pt requesting hospital follow up appt in two weeks. Pt was discharged on 10/17/21 and was seen by Dr. Gonzalo Roy during hospital stay.  Please call [phone]

## 2021-10-25 NOTE — TELEPHONE ENCOUNTER
Pt was discharged from [facility] on 10/17/21 w/ instructions to f/u in 2 wks w/ Dr. Jeimy Richardson. Dr. Jeimy Richardson- do you want to add pt to your schedule?

## 2021-10-25 NOTE — PAYOR COMM NOTE
Discharge Notification    Patient Name: Corinne Choiguy Baez [place] [facility]  [place]scriber #: [id number]  Authorization Number: [id number]  Admit Date/Time: 10/15/2021 1:46 PM  Discharge Date/Time: 10/17/2021 4:37 PM

## 2021-10-26 NOTE — TELEPHONE ENCOUNTER
Okay to schedule for sometime next week. I have ordered chest x-ray which she can have done prior to coming in seeing me.

## 2021-10-26 NOTE — TELEPHONE ENCOUNTER
Pt stopped  At desk- he is anxious to get appt sched for hosp fu- pt also asking if chest xray needed before appt?   Pl call pt

## 2021-10-26 NOTE — CDS QUERY
.How to Answer this Query    1.) Click \"Edit\" button on the toolbar.  2.) Type an \"X\" in the bracket for the diagnosis that applies. (You may also add additional clinical details as you feel necessary to substantiate your response).    3.) Finally click 1 diastolic dysfunction). If you have any questions, please contact Clinical :  Sabrina García RN at [phone]     Thank You!      THIS FORM IS A PERMANENT PART OF THE MEDICAL RECORD

## 2021-10-27 NOTE — TELEPHONE ENCOUNTER
Informed pt of Dr. Donell Fishman orders below. He accepted appt 11/5 10:50 am [facility]. Explained to come about 1-1.5 hours early for CXR, no appt necessary, rx in computer system, & he may contact Central Scheduling to schedule if he wishes.  Central Scheduling's

## 2021-11-03 ENCOUNTER — LAB ENCOUNTER (OUTPATIENT)
Dept: LAB | Age: 56
End: 2021-11-03
Attending: INTERNAL MEDICINE
Payer: MEDICARE

## 2021-11-03 DIAGNOSIS — N18.9 ANEMIA IN CHRONIC KIDNEY DISEASE, UNSPECIFIED CKD STAGE: ICD-10-CM

## 2021-11-03 DIAGNOSIS — D63.1 ANEMIA IN CHRONIC KIDNEY DISEASE, UNSPECIFIED CKD STAGE: ICD-10-CM

## 2021-11-03 DIAGNOSIS — N18.5 CHRONIC KIDNEY DISEASE, STAGE V (HCC): ICD-10-CM

## 2021-11-03 DIAGNOSIS — J90 PLEURAL EFFUSION: ICD-10-CM

## 2021-11-03 PROCEDURE — 36415 COLL VENOUS BLD VENIPUNCTURE: CPT

## 2021-11-03 PROCEDURE — 80069 RENAL FUNCTION PANEL: CPT

## 2021-11-03 PROCEDURE — 85025 COMPLETE CBC W/AUTO DIFF WBC: CPT

## 2021-11-05 ENCOUNTER — OFFICE VISIT (OUTPATIENT)
Dept: PULMONOLOGY | Facility: CLINIC | Age: 56
End: 2021-11-05
Payer: MEDICARE

## 2021-11-05 ENCOUNTER — HOSPITAL ENCOUNTER (OUTPATIENT)
Dept: GENERAL RADIOLOGY | Facility: HOSPITAL | Age: 56
Discharge: HOME | End: 2021-11-05
Attending: INTERNAL MEDICINE
Payer: MEDICARE

## 2021-11-05 VITALS
HEART RATE: 102 BPM | HEIGHT: 61 IN | BODY MASS INDEX: 36.25 KG/M2 | WEIGHT: 192 LBS | RESPIRATION RATE: 18 BRPM | DIASTOLIC BLOOD PRESSURE: 67 MMHG | SYSTOLIC BLOOD PRESSURE: 109 MMHG | OXYGEN SATURATION: 100 %

## 2021-11-05 DIAGNOSIS — J90 PLEURAL EFFUSION, LEFT: Primary | ICD-10-CM

## 2021-11-05 DIAGNOSIS — J90 PLEURAL EFFUSION: ICD-10-CM

## 2021-11-05 PROCEDURE — 3078F DIAST BP <80 MM HG: CPT | Performed by: INTERNAL MEDICINE

## 2021-11-05 PROCEDURE — 3074F SYST BP LT 130 MM HG: CPT | Performed by: INTERNAL MEDICINE

## 2021-11-05 PROCEDURE — 71046 X-RAY EXAM CHEST 2 VIEWS: CPT | Performed by: INTERNAL MEDICINE

## 2021-11-05 PROCEDURE — 99213 OFFICE O/P EST LOW 20 MIN: CPT | Performed by: INTERNAL MEDICINE

## 2021-11-05 PROCEDURE — 3008F BODY MASS INDEX DOCD: CPT | Performed by: INTERNAL MEDICINE

## 2021-11-05 RX ORDER — ATORVASTATIN CALCIUM 80 MG/1
80 TABLET, FILM COATED ORAL DAILY
COMMUNITY
Start: 2021-10-13 | End: 2021-11-24

## 2021-11-05 NOTE — PROGRESS NOTES
Referring Physician  Aviva Walker MD    History of Present Illness  Patient presents today for follow-up visit to pulmonary clinic. Hospitalized with evidence of postoperative left pleural effusion underwent thoracentesis at that time.   Denies any signi External Ointment, APPLY TOPICALLY TO THE AFFECTED AREA THREE TIMES DAILY, Disp: 30 g, Rfl: 2  clopidogrel 75 MG Oral Tab, Take 75 mg by mouth daily.  (Patient not taking: Reported on 11/5/2021), Disp: , Rfl:   Glucose Blood (Domonique Mike) In Vitro Strip,

## 2021-11-08 PROBLEM — E66.01 MORBID (SEVERE) OBESITY DUE TO EXCESS CALORIES (HCC): Status: ACTIVE | Noted: 2021-11-08

## 2021-11-24 ENCOUNTER — OFFICE VISIT (OUTPATIENT)
Dept: WOUND CARE | Facility: HOSPITAL | Age: 56
End: 2021-11-24
Attending: CLINICAL NURSE SPECIALIST
Payer: MEDICARE

## 2021-11-24 VITALS
DIASTOLIC BLOOD PRESSURE: 72 MMHG | OXYGEN SATURATION: 99 % | TEMPERATURE: 98 F | WEIGHT: 190 LBS | RESPIRATION RATE: 17 BRPM | BODY MASS INDEX: 35.87 KG/M2 | HEIGHT: 61 IN | HEART RATE: 110 BPM | SYSTOLIC BLOOD PRESSURE: 132 MMHG

## 2021-11-24 DIAGNOSIS — T81.31XA SURGICAL WOUND DEHISCENCE, INITIAL ENCOUNTER: Primary | ICD-10-CM

## 2021-11-24 DIAGNOSIS — S81.802A WOUND OF LEFT LOWER EXTREMITY, INITIAL ENCOUNTER: ICD-10-CM

## 2021-11-24 DIAGNOSIS — E11.8 TYPE 2 DIABETES MELLITUS WITH COMPLICATION, WITHOUT LONG-TERM CURRENT USE OF INSULIN (HCC): ICD-10-CM

## 2021-11-24 PROCEDURE — 99215 OFFICE O/P EST HI 40 MIN: CPT | Performed by: CLINICAL NURSE SPECIALIST

## 2021-11-24 PROCEDURE — 97597 DBRDMT OPN WND 1ST 20 CM/<: CPT | Performed by: CLINICAL NURSE SPECIALIST

## 2021-11-24 NOTE — PROGRESS NOTES
Subjective   Luis Felipeprasanna Salcedo is a 49year old male. Patient presents with:  Wound Care: Patient is here for an initial evaluation and management of his left lower leg wound.     Patient is a 49year old male with Hx: HTN, High Cholesterol, Type 2 DM, Reti Length (cm) 8.5 cm 11/24/21 0913   Wound Width (cm) 1.5 cm 11/24/21 0913   Wound Surface Area (cm^2) 12.75 cm^2 11/24/21 0913   Wound Depth (cm) 0.2 cm 11/24/21 0913   Wound Volume (cm^3) 2.55 cm^3 11/24/21 0913   Margins Attached edges;Poorly defined 11/2 Hx: Type 2 DM, Diabetic booklet was given to patient as an educational resource and recommended for patient to have a podiatrist for DM foot care and trimming of toenails. Patient verbalized understanding of instructions.       Encounter Diagnosis  Surgical Maintain glycemic control within normal range. Orders  See wound care plan. Follow-Up  Return in about 1 week (around 12/1/2021) for APN visit x 30 mins.

## 2021-11-29 PROBLEM — I25.10 CORONARY ARTERY DISEASE INVOLVING NATIVE CORONARY ARTERY OF NATIVE HEART WITHOUT ANGINA PECTORIS: Status: ACTIVE | Noted: 2021-09-28

## 2021-11-29 PROCEDURE — 83036 HEMOGLOBIN GLYCOSYLATED A1C: CPT | Performed by: INTERNAL MEDICINE

## 2021-11-29 PROCEDURE — 85025 COMPLETE CBC W/AUTO DIFF WBC: CPT | Performed by: INTERNAL MEDICINE

## 2021-11-29 PROCEDURE — 84443 ASSAY THYROID STIM HORMONE: CPT | Performed by: INTERNAL MEDICINE

## 2021-11-29 PROCEDURE — 80061 LIPID PANEL: CPT | Performed by: INTERNAL MEDICINE

## 2021-11-29 PROCEDURE — 82570 ASSAY OF URINE CREATININE: CPT | Performed by: INTERNAL MEDICINE

## 2021-11-29 PROCEDURE — 82306 VITAMIN D 25 HYDROXY: CPT | Performed by: INTERNAL MEDICINE

## 2021-11-29 PROCEDURE — 80053 COMPREHEN METABOLIC PANEL: CPT | Performed by: INTERNAL MEDICINE

## 2021-11-29 PROCEDURE — 82043 UR ALBUMIN QUANTITATIVE: CPT | Performed by: INTERNAL MEDICINE

## 2021-11-29 PROCEDURE — 82550 ASSAY OF CK (CPK): CPT | Performed by: INTERNAL MEDICINE

## 2021-12-02 ENCOUNTER — OFFICE VISIT (OUTPATIENT)
Dept: WOUND CARE | Facility: HOSPITAL | Age: 56
End: 2021-12-02
Attending: CLINICAL NURSE SPECIALIST
Payer: MEDICARE

## 2021-12-02 VITALS
TEMPERATURE: 98 F | SYSTOLIC BLOOD PRESSURE: 119 MMHG | OXYGEN SATURATION: 99 % | DIASTOLIC BLOOD PRESSURE: 72 MMHG | HEART RATE: 93 BPM | RESPIRATION RATE: 17 BRPM

## 2021-12-02 DIAGNOSIS — S81.802D WOUND OF LEFT LOWER EXTREMITY, SUBSEQUENT ENCOUNTER: ICD-10-CM

## 2021-12-02 DIAGNOSIS — T81.31XA SURGICAL WOUND DEHISCENCE, INITIAL ENCOUNTER: ICD-10-CM

## 2021-12-02 PROCEDURE — 97597 DBRDMT OPN WND 1ST 20 CM/<: CPT | Performed by: CLINICAL NURSE SPECIALIST

## 2021-12-02 NOTE — PROGRESS NOTES
Subjective   Genetta Bad is a 49year old male.     Patient presents with:  Wound Recheck: Patient is here for a follow-up    Patient is a 49year old male with Hx: HTN, High Cholesterol, Type 2 DM, Retinal Detachment, Retinopathy, CKD, S/P CABG 9/27/202 (Barrier/Moisturizer/Ointment) 12/02/21 0955   Dressing Kerlix roll;Tape 12/02/21 0955   Dressing Changed Changed 12/02/21 0955   Dressing Status Clean;Dry; Intact 12/02/21 0955   Wound Length (cm) 6 cm 12/02/21 0955   Wound Width (cm) 0.5 cm 12/02/21 0955 0.6  Volume (cm^3): 1.5  Devitalized tissue debrided: biofilm, fibrin and slough  Instrument(s) utilized: curette, forceps and scissors  Bleeding: medium  Hemostasis obtained with: silver nitrate  Procedural pain (0-10): 2  Post-procedural pain: 0   Respon Add Jovon drink to diet in between meal daily. Sample of [address] given with coupon. This will promote wound healing. Maintain glycemic control within normal range. Patient to follow up with outpatient wound clinic next week.  Patient verbalized understandi

## 2021-12-03 ENCOUNTER — LAB ENCOUNTER (OUTPATIENT)
Dept: LAB | Age: 56
End: 2021-12-03
Attending: INTERNAL MEDICINE
Payer: MEDICARE

## 2021-12-03 DIAGNOSIS — N18.9 ANEMIA IN CHRONIC KIDNEY DISEASE, UNSPECIFIED CKD STAGE: ICD-10-CM

## 2021-12-03 DIAGNOSIS — N18.5 CHRONIC KIDNEY DISEASE, STAGE 5 (HCC): ICD-10-CM

## 2021-12-03 DIAGNOSIS — E11.21 TYPE 2 DIABETES MELLITUS WITH DIABETIC NEPHROPATHY, UNSPECIFIED WHETHER LONG TERM INSULIN USE (HCC): ICD-10-CM

## 2021-12-03 DIAGNOSIS — N25.81 SECONDARY HYPERPARATHYROIDISM OF RENAL ORIGIN (HCC): ICD-10-CM

## 2021-12-03 DIAGNOSIS — D63.1 ANEMIA IN CHRONIC KIDNEY DISEASE, UNSPECIFIED CKD STAGE: ICD-10-CM

## 2021-12-03 PROCEDURE — 85025 COMPLETE CBC W/AUTO DIFF WBC: CPT

## 2021-12-03 PROCEDURE — 36415 COLL VENOUS BLD VENIPUNCTURE: CPT

## 2021-12-03 PROCEDURE — 80069 RENAL FUNCTION PANEL: CPT

## 2021-12-09 ENCOUNTER — OFFICE VISIT (OUTPATIENT)
Dept: WOUND CARE | Facility: HOSPITAL | Age: 56
End: 2021-12-09
Attending: CLINICAL NURSE SPECIALIST
Payer: MEDICARE

## 2021-12-09 VITALS
RESPIRATION RATE: 17 BRPM | HEART RATE: 87 BPM | DIASTOLIC BLOOD PRESSURE: 69 MMHG | SYSTOLIC BLOOD PRESSURE: 118 MMHG | OXYGEN SATURATION: 99 % | TEMPERATURE: 99 F

## 2021-12-09 DIAGNOSIS — S81.802D WOUND OF LEFT LOWER EXTREMITY, SUBSEQUENT ENCOUNTER: ICD-10-CM

## 2021-12-09 DIAGNOSIS — T81.31XA SURGICAL WOUND DEHISCENCE, INITIAL ENCOUNTER: ICD-10-CM

## 2021-12-09 PROCEDURE — 97597 DBRDMT OPN WND 1ST 20 CM/<: CPT | Performed by: CLINICAL NURSE SPECIALIST

## 2021-12-09 NOTE — PROGRESS NOTES
Subjective   Rex Rosado is a 49year old male.     Patient presents with:  Wound Recheck: Patient is here for a follow-up    Patient is a 49year old male with Hx: HTN, High Cholesterol, Type 2 DM, Retinal Detachment, Retinopathy, CKD, S/P CABG 9/27/202 (Barrier/Moisturizer/Ointment) 12/09/21 1001   Dressing Kerlix roll;Tape 12/09/21 1001   Dressing Changed Changed 12/09/21 1001   Dressing Status Clean;Dry; Intact 12/09/21 1001   Wound Length (cm) 6 cm 12/09/21 1001   Wound Width (cm) 0.5 cm 12/09/21 1001 3  Volume (cm^3): 0.6  Devitalized tissue debrided: biofilm, fibrin and slough  Instrument(s) utilized: curette  Bleeding: small  Hemostasis obtained with: pressure  Procedural pain (0-10): 2  Post-procedural pain: 0   Response to treatment: procedure was Maintain glycemic control within normal range. Orders  See wound care plan    Follow-Up  Return in about 1 week (around 12/16/2021) for APN visit x 30 mins.

## 2021-12-15 NOTE — PROGRESS NOTES
Notified patient that he will be seen on Friday by RN visit. If  needs to be seen by nurse practitioner then NP  will follow up with patient on this day. Patient verbalized understanding of instructions.

## 2021-12-16 ENCOUNTER — APPOINTMENT (OUTPATIENT)
Dept: WOUND CARE | Facility: HOSPITAL | Age: 56
End: 2021-12-16
Attending: NURSE PRACTITIONER
Payer: MEDICARE

## 2021-12-17 ENCOUNTER — OFFICE VISIT (OUTPATIENT)
Dept: WOUND CARE | Facility: HOSPITAL | Age: 56
End: 2021-12-17
Attending: CLINICAL NURSE SPECIALIST
Payer: MEDICARE

## 2021-12-17 VITALS
OXYGEN SATURATION: 100 % | TEMPERATURE: 98 F | DIASTOLIC BLOOD PRESSURE: 66 MMHG | RESPIRATION RATE: 17 BRPM | SYSTOLIC BLOOD PRESSURE: 124 MMHG | HEART RATE: 105 BPM

## 2021-12-17 DIAGNOSIS — T81.31XA SURGICAL WOUND DEHISCENCE, INITIAL ENCOUNTER: ICD-10-CM

## 2021-12-17 DIAGNOSIS — S81.802D WOUND OF LEFT LOWER EXTREMITY, SUBSEQUENT ENCOUNTER: Primary | ICD-10-CM

## 2021-12-17 DIAGNOSIS — T81.31XD DEHISCENCE OF OPERATIVE WOUND, SUBSEQUENT ENCOUNTER: ICD-10-CM

## 2021-12-17 PROCEDURE — 97597 DBRDMT OPN WND 1ST 20 CM/<: CPT | Performed by: NURSE PRACTITIONER

## 2021-12-17 NOTE — PROGRESS NOTES
Subjective   Lino Barrera is a 49year old male. Patient presents with:  Wound Care    HPI  Patient is a 49year old male with Hx: HTN, High Cholesterol, Type 2 DM, Retinal Detachment, Retinopathy, CKD, S/P CABG 9/27/2021 at [address] per patient. Patient de Drainage Amount Small 12/17/21 1000   Drainage Description Serosanguineous 12/17/21 1000   Treatments Cleansed; Compression; Saline/Wound ;Topical (Barrier/Moisturizer/Ointment) 12/17/21 1000   Dressing Tape 12/17/21 1000   Dressing Changed Changed Diagnosis  Wound of left lower extremity, subsequent encounter  (primary encounter diagnosis)  Surgical wound dehiscence, initial encounter  Dehiscence of operative wound, subsequent encounter    Problem List  Patient Active Problem List:     Pure hypercho Placed This Encounter      Glucose, Serum    Continue with advanced dressing and compression to enhance wound healing. Discussed signs and symptoms of infection, encourage patient to assess skin daily.   Discussed the treatment plan with patient, patient v

## 2021-12-23 ENCOUNTER — OFFICE VISIT (OUTPATIENT)
Dept: WOUND CARE | Facility: HOSPITAL | Age: 56
End: 2021-12-23
Attending: CLINICAL NURSE SPECIALIST
Payer: MEDICARE

## 2021-12-23 VITALS
RESPIRATION RATE: 20 BRPM | SYSTOLIC BLOOD PRESSURE: 146 MMHG | DIASTOLIC BLOOD PRESSURE: 82 MMHG | TEMPERATURE: 97 F | OXYGEN SATURATION: 100 % | HEART RATE: 108 BPM

## 2021-12-23 DIAGNOSIS — T81.31XD DEHISCENCE OF OPERATIVE WOUND, SUBSEQUENT ENCOUNTER: ICD-10-CM

## 2021-12-23 DIAGNOSIS — S81.802D WOUND OF LEFT LOWER EXTREMITY, SUBSEQUENT ENCOUNTER: ICD-10-CM

## 2021-12-23 PROCEDURE — 99214 OFFICE O/P EST MOD 30 MIN: CPT | Performed by: CLINICAL NURSE SPECIALIST

## 2021-12-23 NOTE — PROGRESS NOTES
Subjective   Bart Ray is a 49year old male. Patient presents with:  Wound Care    Patient is a 49year old male with Hx: HTN, High Cholesterol, Type 2 DM, Retinal Detachment, Retinopathy, CKD, S/P CABG 9/27/2021 at [facility] per patient. Patient develop 12/23/21 1013   Dressing Status Dressing Changed 12/23/21 1013   Wound Length (cm) 5.5 cm 12/23/21 1013   Wound Width (cm) 0.3 cm 12/23/21 1013   Wound Surface Area (cm^2) 1.65 cm^2 12/23/21 1013   Wound Depth (cm) 0.1 cm 12/23/21 1013   Wound Volume (cm^3 disease ([facility] [address]     Morbid (severe) obesity due to excess calories [facility])     Surgical wound dehiscence     Wound of left leg     Type 2 diabetes mellitus with complication, without long-term current use of insulin (HCC)     Coronary artery disease involving

## 2022-01-13 ENCOUNTER — OFFICE VISIT (OUTPATIENT)
Dept: WOUND CARE | Facility: HOSPITAL | Age: 57
End: 2022-01-13
Attending: CLINICAL NURSE SPECIALIST
Payer: MEDICARE

## 2022-01-13 VITALS
HEART RATE: 93 BPM | DIASTOLIC BLOOD PRESSURE: 81 MMHG | OXYGEN SATURATION: 100 % | RESPIRATION RATE: 16 BRPM | TEMPERATURE: 97 F | SYSTOLIC BLOOD PRESSURE: 142 MMHG

## 2022-01-13 DIAGNOSIS — T81.31XD DEHISCENCE OF OPERATIVE WOUND, SUBSEQUENT ENCOUNTER: Primary | ICD-10-CM

## 2022-01-13 LAB — AMB EXT GLUCOSE: 130 MG/DL

## 2022-01-13 PROCEDURE — 99213 OFFICE O/P EST LOW 20 MIN: CPT | Performed by: NURSE PRACTITIONER

## 2022-01-13 NOTE — PROGRESS NOTES
Subjective   Anola Daughters is a 49year old male. Patient presents with:  Wound Care    HPI    Patient is a 49year old male with Hx: HTN, High Cholesterol, Type 2 DM, Retinal Detachment, Retinopathy, CKD, S/P CABG 9/27/2021 at [facility] per patient. Patient Cleansed   Dressing    Dressing Changed REGINALD   Dressing Status    Wound Length (cm) 0 cm   Wound Width (cm) 0 cm   Wound Surface Area (cm^2) 0 cm^2   Wound Depth (cm) 0 cm   Wound Volume (cm^3) 0 cm^3   Wound Healing % 100   Margins Attached edges   Non-sta this time patient has no open wounds, therefore patient will be discharged from [facility]. Instructed patient to follow up with PCP for medical management.   Demonstrated scar tissue message for reducing scaring with vasline daily and as ne

## 2022-01-14 ENCOUNTER — LAB ENCOUNTER (OUTPATIENT)
Dept: LAB | Age: 57
End: 2022-01-14
Attending: INTERNAL MEDICINE
Payer: MEDICARE

## 2022-01-14 DIAGNOSIS — N17.9 ACUTE RENAL FAILURE SUPERIMPOSED ON STAGE 4 CHRONIC KIDNEY DISEASE, UNSPECIFIED ACUTE RENAL FAILURE TYPE (HCC): ICD-10-CM

## 2022-01-14 DIAGNOSIS — N18.30 ANEMIA DUE TO STAGE 3 CHRONIC KIDNEY DISEASE, UNSPECIFIED WHETHER STAGE 3A OR 3B CKD (HCC): ICD-10-CM

## 2022-01-14 DIAGNOSIS — D63.1 ANEMIA DUE TO STAGE 3 CHRONIC KIDNEY DISEASE, UNSPECIFIED WHETHER STAGE 3A OR 3B CKD (HCC): ICD-10-CM

## 2022-01-14 DIAGNOSIS — N18.4 ACUTE RENAL FAILURE SUPERIMPOSED ON STAGE 4 CHRONIC KIDNEY DISEASE, UNSPECIFIED ACUTE RENAL FAILURE TYPE (HCC): ICD-10-CM

## 2022-01-14 LAB
ALBUMIN SERPL-MCNC: 2.8 G/DL (ref 3.4–5)
ANION GAP SERPL CALC-SCNC: 10 MMOL/L (ref 0–18)
BUN BLD-MCNC: 46 MG/DL (ref 7–18)
CALCIUM BLD-MCNC: 8.3 MG/DL (ref 8.5–10.1)
CHLORIDE SERPL-SCNC: 117 MMOL/L (ref 98–112)
CO2 SERPL-SCNC: 17 MMOL/L (ref 21–32)
CREAT BLD-MCNC: 4.74 MG/DL
GLUCOSE BLD-MCNC: 88 MG/DL (ref 70–99)
HCT VFR BLD AUTO: 31.1 %
HGB BLD-MCNC: 9.6 G/DL
OSMOLALITY SERPL CALC.SUM OF ELEC: 309 MOSM/KG (ref 275–295)
PHOSPHATE SERPL-MCNC: 6.6 MG/DL (ref 2.5–4.9)
POTASSIUM SERPL-SCNC: 3.9 MMOL/L (ref 3.5–5.1)
SODIUM SERPL-SCNC: 144 MMOL/L (ref 136–145)

## 2022-01-14 PROCEDURE — 80069 RENAL FUNCTION PANEL: CPT

## 2022-01-14 PROCEDURE — 85018 HEMOGLOBIN: CPT

## 2022-01-14 PROCEDURE — 85014 HEMATOCRIT: CPT

## 2022-01-14 PROCEDURE — 36415 COLL VENOUS BLD VENIPUNCTURE: CPT

## 2022-02-25 ENCOUNTER — LAB ENCOUNTER (OUTPATIENT)
Dept: LAB | Age: 57
End: 2022-02-25
Attending: INTERNAL MEDICINE
Payer: MEDICARE

## 2022-02-25 DIAGNOSIS — N18.5 CHRONIC KIDNEY DISEASE, STAGE V (HCC): ICD-10-CM

## 2022-02-25 LAB
ALBUMIN SERPL-MCNC: 3.1 G/DL (ref 3.4–5)
ANION GAP SERPL CALC-SCNC: 8 MMOL/L (ref 0–18)
BUN BLD-MCNC: 56 MG/DL (ref 7–18)
CALCIUM BLD-MCNC: 8.2 MG/DL (ref 8.5–10.1)
CHLORIDE SERPL-SCNC: 123 MMOL/L (ref 98–112)
CO2 SERPL-SCNC: 16 MMOL/L (ref 21–32)
CREAT BLD-MCNC: 5.53 MG/DL
GLUCOSE BLD-MCNC: 97 MG/DL (ref 70–99)
HCT VFR BLD AUTO: 29.3 %
HGB BLD-MCNC: 9.3 G/DL
OSMOLALITY SERPL CALC.SUM OF ELEC: 319 MOSM/KG (ref 275–295)
PHOSPHATE SERPL-MCNC: 5.8 MG/DL (ref 2.5–4.9)
POTASSIUM SERPL-SCNC: 4.2 MMOL/L (ref 3.5–5.1)
SODIUM SERPL-SCNC: 147 MMOL/L (ref 136–145)

## 2022-02-25 PROCEDURE — 85018 HEMOGLOBIN: CPT

## 2022-02-25 PROCEDURE — 80069 RENAL FUNCTION PANEL: CPT

## 2022-02-25 PROCEDURE — 85014 HEMATOCRIT: CPT

## 2022-02-25 PROCEDURE — 36415 COLL VENOUS BLD VENIPUNCTURE: CPT

## 2022-03-02 ENCOUNTER — HOSPITAL ENCOUNTER (OUTPATIENT)
Facility: HOSPITAL | Age: 57
Setting detail: OBSERVATION
Discharge: HOME | End: 2022-03-04
Attending: EMERGENCY MEDICINE | Admitting: INTERNAL MEDICINE
Payer: MEDICARE

## 2022-03-02 ENCOUNTER — APPOINTMENT (OUTPATIENT)
Dept: GENERAL RADIOLOGY | Facility: HOSPITAL | Age: 57
End: 2022-03-02
Attending: EMERGENCY MEDICINE
Payer: MEDICARE

## 2022-03-02 ENCOUNTER — APPOINTMENT (OUTPATIENT)
Dept: INTERVENTIONAL RADIOLOGY/VASCULAR | Facility: HOSPITAL | Age: 57
End: 2022-03-02
Attending: INTERNAL MEDICINE
Payer: MEDICARE

## 2022-03-02 DIAGNOSIS — I50.9 ACUTE ON CHRONIC CONGESTIVE HEART FAILURE, UNSPECIFIED HEART FAILURE TYPE (HCC): ICD-10-CM

## 2022-03-02 DIAGNOSIS — N18.6 ESRD (END STAGE RENAL DISEASE) (HCC): Primary | ICD-10-CM

## 2022-03-02 LAB
ANION GAP SERPL CALC-SCNC: 7 MMOL/L (ref 0–18)
BASOPHILS # BLD AUTO: 0.02 X10(3) UL (ref 0–0.2)
BASOPHILS NFR BLD AUTO: 0.4 %
BUN BLD-MCNC: 68 MG/DL (ref 7–18)
BUN/CREAT SERPL: 12.3 (ref 10–20)
CALCIUM BLD-MCNC: 8.1 MG/DL (ref 8.5–10.1)
CHLORIDE SERPL-SCNC: 118 MMOL/L (ref 98–112)
CO2 SERPL-SCNC: 18 MMOL/L (ref 21–32)
CREAT BLD-MCNC: 5.53 MG/DL
DEPRECATED RDW RBC AUTO: 49.5 FL (ref 35.1–46.3)
EOSINOPHIL # BLD AUTO: 0.08 X10(3) UL (ref 0–0.7)
EOSINOPHIL NFR BLD AUTO: 1.5 %
ERYTHROCYTE [DISTWIDTH] IN BLOOD BY AUTOMATED COUNT: 13.8 % (ref 11–15)
EST. AVERAGE GLUCOSE BLD GHB EST-MCNC: 105 MG/DL (ref 68–126)
GLUCOSE BLD-MCNC: 180 MG/DL (ref 70–99)
GLUCOSE BLDC GLUCOMTR-MCNC: 107 MG/DL (ref 70–99)
GLUCOSE BLDC GLUCOMTR-MCNC: 127 MG/DL (ref 70–99)
HBA1C MFR BLD: 5.3 % (ref ?–5.7)
HBV CORE AB SERPL QL IA: NONREACTIVE
HBV SURFACE AB SER QL: NONREACTIVE
HBV SURFACE AB SERPL IA-ACNC: <3.1 MIU/ML
HBV SURFACE AG SER-ACNC: <0.1 [IU]/L
HBV SURFACE AG SERPL QL IA: NONREACTIVE
HCT VFR BLD AUTO: 29.5 %
HGB BLD-MCNC: 9.2 G/DL
IMM GRANULOCYTES # BLD AUTO: 0.01 X10(3) UL (ref 0–1)
IMM GRANULOCYTES NFR BLD: 0.2 %
LYMPHOCYTES # BLD AUTO: 1.14 X10(3) UL (ref 1–4)
LYMPHOCYTES NFR BLD AUTO: 21.7 %
MCH RBC QN AUTO: 30.4 PG (ref 26–34)
MCHC RBC AUTO-ENTMCNC: 31.2 G/DL (ref 31–37)
MCV RBC AUTO: 97.4 FL
MONOCYTES # BLD AUTO: 0.33 X10(3) UL (ref 0.1–1)
MONOCYTES NFR BLD AUTO: 6.3 %
NEUTROPHILS # BLD AUTO: 3.67 X10 (3) UL (ref 1.5–7.7)
NEUTROPHILS # BLD AUTO: 3.67 X10(3) UL (ref 1.5–7.7)
NEUTROPHILS NFR BLD AUTO: 69.9 %
NT-PROBNP SERPL-MCNC: 3249 PG/ML (ref ?–125)
PLATELET # BLD AUTO: 130 10(3)UL (ref 150–450)
POTASSIUM SERPL-SCNC: 4.1 MMOL/L (ref 3.5–5.1)
RBC # BLD AUTO: 3.03 X10(6)UL
SARS-COV-2 RNA RESP QL NAA+PROBE: NOT DETECTED
SODIUM SERPL-SCNC: 143 MMOL/L (ref 136–145)
WBC # BLD AUTO: 5.3 X10(3) UL (ref 4–11)

## 2022-03-02 PROCEDURE — 93005 ELECTROCARDIOGRAM TRACING: CPT

## 2022-03-02 PROCEDURE — 80503 PATH CLIN CONSLTJ SF 5-20: CPT | Performed by: INTERNAL MEDICINE

## 2022-03-02 PROCEDURE — 80048 BASIC METABOLIC PNL TOTAL CA: CPT | Performed by: EMERGENCY MEDICINE

## 2022-03-02 PROCEDURE — 77001 FLUOROGUIDE FOR VEIN DEVICE: CPT

## 2022-03-02 PROCEDURE — 86706 HEP B SURFACE ANTIBODY: CPT | Performed by: INTERNAL MEDICINE

## 2022-03-02 PROCEDURE — 86704 HEP B CORE ANTIBODY TOTAL: CPT | Performed by: INTERNAL MEDICINE

## 2022-03-02 PROCEDURE — 83880 ASSAY OF NATRIURETIC PEPTIDE: CPT | Performed by: EMERGENCY MEDICINE

## 2022-03-02 PROCEDURE — 85025 COMPLETE CBC W/AUTO DIFF WBC: CPT | Performed by: EMERGENCY MEDICINE

## 2022-03-02 PROCEDURE — 82962 GLUCOSE BLOOD TEST: CPT

## 2022-03-02 PROCEDURE — 87340 HEPATITIS B SURFACE AG IA: CPT | Performed by: INTERNAL MEDICINE

## 2022-03-02 PROCEDURE — 96374 THER/PROPH/DIAG INJ IV PUSH: CPT

## 2022-03-02 PROCEDURE — 36558 INSERT TUNNELED CV CATH: CPT

## 2022-03-02 PROCEDURE — 0JH63XZ INSERTION OF TUNNELED VASCULAR ACCESS DEVICE INTO CHEST SUBCUTANEOUS TISSUE AND FASCIA, PERCUTANEOUS APPROACH: ICD-10-PCS | Performed by: RADIOLOGY

## 2022-03-02 PROCEDURE — 83036 HEMOGLOBIN GLYCOSYLATED A1C: CPT | Performed by: INTERNAL MEDICINE

## 2022-03-02 PROCEDURE — 99285 EMERGENCY DEPT VISIT HI MDM: CPT

## 2022-03-02 PROCEDURE — 36415 COLL VENOUS BLD VENIPUNCTURE: CPT | Performed by: INTERNAL MEDICINE

## 2022-03-02 PROCEDURE — 93010 ELECTROCARDIOGRAM REPORT: CPT | Performed by: EMERGENCY MEDICINE

## 2022-03-02 PROCEDURE — S0171 BUMETANIDE 0.5 MG: HCPCS | Performed by: INTERNAL MEDICINE

## 2022-03-02 PROCEDURE — 71045 X-RAY EXAM CHEST 1 VIEW: CPT | Performed by: EMERGENCY MEDICINE

## 2022-03-02 RX ORDER — ACETAZOLAMIDE 250 MG/1
125 TABLET ORAL 3 TIMES DAILY
Status: DISCONTINUED | OUTPATIENT
Start: 2022-03-02 | End: 2022-03-04

## 2022-03-02 RX ORDER — DEXTROSE MONOHYDRATE 25 G/50ML
50 INJECTION, SOLUTION INTRAVENOUS
Status: DISCONTINUED | OUTPATIENT
Start: 2022-03-02 | End: 2022-03-04

## 2022-03-02 RX ORDER — NICOTINE POLACRILEX 4 MG
30 LOZENGE BUCCAL
Status: DISCONTINUED | OUTPATIENT
Start: 2022-03-02 | End: 2022-03-04

## 2022-03-02 RX ORDER — DIAZEPAM 5 MG/1
10 TABLET ORAL EVERY 12 HOURS PRN
Status: DISCONTINUED | OUTPATIENT
Start: 2022-03-02 | End: 2022-03-04

## 2022-03-02 RX ORDER — ROSUVASTATIN CALCIUM 10 MG/1
10 TABLET, COATED ORAL NIGHTLY
Status: DISCONTINUED | OUTPATIENT
Start: 2022-03-02 | End: 2022-03-03

## 2022-03-02 RX ORDER — NICOTINE POLACRILEX 4 MG
15 LOZENGE BUCCAL
Status: DISCONTINUED | OUTPATIENT
Start: 2022-03-02 | End: 2022-03-04

## 2022-03-02 RX ORDER — HYDROCODONE BITARTRATE AND ACETAMINOPHEN 10; 325 MG/1; MG/1
1 TABLET ORAL EVERY 6 HOURS PRN
Status: DISCONTINUED | OUTPATIENT
Start: 2022-03-02 | End: 2022-03-04

## 2022-03-02 RX ORDER — CEFAZOLIN SODIUM/WATER 2 G/20 ML
SYRINGE (ML) INTRAVENOUS
Status: COMPLETED
Start: 2022-03-02 | End: 2022-03-02

## 2022-03-02 RX ORDER — MIDAZOLAM HYDROCHLORIDE 1 MG/ML
INJECTION INTRAMUSCULAR; INTRAVENOUS
Status: DISCONTINUED
Start: 2022-03-02 | End: 2022-03-02 | Stop reason: WASHOUT

## 2022-03-02 RX ORDER — BRIMONIDINE TARTRATE 2 MG/ML
1 SOLUTION/ DROPS OPHTHALMIC 3 TIMES DAILY
Status: DISCONTINUED | OUTPATIENT
Start: 2022-03-02 | End: 2022-03-02

## 2022-03-02 RX ORDER — BUMETANIDE 0.25 MG/ML
2 INJECTION, SOLUTION INTRAMUSCULAR; INTRAVENOUS ONCE
Status: COMPLETED | OUTPATIENT
Start: 2022-03-02 | End: 2022-03-02

## 2022-03-02 RX ORDER — HEPARIN SODIUM 1000 [USP'U]/ML
INJECTION, SOLUTION INTRAVENOUS; SUBCUTANEOUS
Status: COMPLETED
Start: 2022-03-02 | End: 2022-03-02

## 2022-03-02 RX ORDER — BRIMONIDINE TARTRATE, TIMOLOL MALEATE 2; 5 MG/ML; MG/ML
1 SOLUTION/ DROPS OPHTHALMIC 2 TIMES DAILY
Status: DISCONTINUED | OUTPATIENT
Start: 2022-03-02 | End: 2022-03-04

## 2022-03-02 RX ORDER — LATANOPROST 50 UG/ML
1 SOLUTION/ DROPS OPHTHALMIC NIGHTLY
Status: DISCONTINUED | OUTPATIENT
Start: 2022-03-02 | End: 2022-03-04

## 2022-03-02 RX ORDER — ONDANSETRON 2 MG/ML
INJECTION INTRAMUSCULAR; INTRAVENOUS
Status: COMPLETED
Start: 2022-03-02 | End: 2022-03-02

## 2022-03-02 RX ORDER — HYDRALAZINE HYDROCHLORIDE 25 MG/1
25 TABLET, FILM COATED ORAL EVERY 8 HOURS SCHEDULED
Status: DISCONTINUED | OUTPATIENT
Start: 2022-03-02 | End: 2022-03-04

## 2022-03-02 RX ORDER — LIDOCAINE HYDROCHLORIDE 20 MG/ML
INJECTION, SOLUTION EPIDURAL; INFILTRATION; INTRACAUDAL; PERINEURAL
Status: COMPLETED
Start: 2022-03-02 | End: 2022-03-02

## 2022-03-02 RX ORDER — DIPHENHYDRAMINE HYDROCHLORIDE 50 MG/ML
INJECTION INTRAMUSCULAR; INTRAVENOUS
Status: COMPLETED
Start: 2022-03-02 | End: 2022-03-02

## 2022-03-02 RX ORDER — TIMOLOL MALEATE 5 MG/ML
1 SOLUTION/ DROPS OPHTHALMIC 2 TIMES DAILY
Status: DISCONTINUED | OUTPATIENT
Start: 2022-03-02 | End: 2022-03-02

## 2022-03-02 NOTE — BRIEF PROCEDURE NOTE
[facility]  Procedure Note    Huey Figueroa Patient Status:  Emergency    1965 MRN [id number]   Location [address] Attending Estela Torres MD   Hosp Day # 0 PCP Isadora Curiel MD     Procedure:  Tunneled right internal jugular PermCath dialysis catheter    Pre-Procedure Diagnosis: End-stage renal disease    Post-Procedure Diagnosis: Same    Anesthesia:  Local    Findings: Tunneled permacath placed via right IJ, tip at SVC/RA    Specimens: 0    Blood Loss:  0     Complications:  None      Vandana Cardenas MD  3/2/2022

## 2022-03-02 NOTE — ED INITIAL ASSESSMENT (HPI)
Pending kidney transplant patient through [facility]; sent for \"fluid overload\". Having shortness of breath and told to come here to be put on dialysis.

## 2022-03-02 NOTE — ED QUICK NOTES
Orders for admission, patient is aware of plan and ready to go upstairs. Any questions, please call ED RN yvon at extension [phone].      Patient Covid vaccination status: Fully vaccinated     COVID Test Ordered in ED: Rapid SARS-CoV-2 by PCR    COVID Suspicion at Admission: N/A    Running Infusions:  None    Mental Status/LOC at time of transport: ax4    Other pertinent information:   CIWA score: N/A   NIH score:  N/A

## 2022-03-03 LAB
ALBUMIN SERPL-MCNC: 2.9 G/DL (ref 3.4–5)
ANION GAP SERPL CALC-SCNC: 8 MMOL/L (ref 0–18)
BASOPHILS # BLD AUTO: 0.03 X10(3) UL (ref 0–0.2)
BASOPHILS NFR BLD AUTO: 0.6 %
BUN BLD-MCNC: 70 MG/DL (ref 7–18)
BUN/CREAT SERPL: 11.8 (ref 10–20)
CALCIUM BLD-MCNC: 7.9 MG/DL (ref 8.5–10.1)
CHLORIDE SERPL-SCNC: 120 MMOL/L (ref 98–112)
CO2 SERPL-SCNC: 17 MMOL/L (ref 21–32)
CREAT BLD-MCNC: 5.91 MG/DL
DEPRECATED HBV CORE AB SER IA-ACNC: 9.4 NG/ML
DEPRECATED RDW RBC AUTO: 48 FL (ref 35.1–46.3)
EOSINOPHIL # BLD AUTO: 0.05 X10(3) UL (ref 0–0.7)
EOSINOPHIL NFR BLD AUTO: 1 %
ERYTHROCYTE [DISTWIDTH] IN BLOOD BY AUTOMATED COUNT: 13.4 % (ref 11–15)
GLUCOSE BLD-MCNC: 95 MG/DL (ref 70–99)
GLUCOSE BLDC GLUCOMTR-MCNC: 168 MG/DL (ref 70–99)
GLUCOSE BLDC GLUCOMTR-MCNC: 170 MG/DL (ref 70–99)
GLUCOSE BLDC GLUCOMTR-MCNC: 219 MG/DL (ref 70–99)
GLUCOSE BLDC GLUCOMTR-MCNC: 94 MG/DL (ref 70–99)
HCT VFR BLD AUTO: 27.4 %
HGB BLD-MCNC: 8.3 G/DL
IMM GRANULOCYTES # BLD AUTO: 0.02 X10(3) UL (ref 0–1)
IMM GRANULOCYTES NFR BLD: 0.4 %
IRON SATN MFR SERPL: 12 %
IRON SERPL-MCNC: 39 UG/DL
LYMPHOCYTES # BLD AUTO: 1.05 X10(3) UL (ref 1–4)
LYMPHOCYTES NFR BLD AUTO: 20.2 %
MCH RBC QN AUTO: 29.5 PG (ref 26–34)
MCHC RBC AUTO-ENTMCNC: 30.3 G/DL (ref 31–37)
MCV RBC AUTO: 97.5 FL
MONOCYTES # BLD AUTO: 0.38 X10(3) UL (ref 0.1–1)
MONOCYTES NFR BLD AUTO: 7.3 %
NEUTROPHILS # BLD AUTO: 3.67 X10 (3) UL (ref 1.5–7.7)
NEUTROPHILS # BLD AUTO: 3.67 X10(3) UL (ref 1.5–7.7)
NEUTROPHILS NFR BLD AUTO: 70.5 %
OSMOLALITY SERPL CALC.SUM OF ELEC: 320 MOSM/KG (ref 275–295)
PLATELET # BLD AUTO: 129 10(3)UL (ref 150–450)
POTASSIUM SERPL-SCNC: 4.2 MMOL/L (ref 3.5–5.1)
RBC # BLD AUTO: 2.81 X10(6)UL
SODIUM SERPL-SCNC: 145 MMOL/L (ref 136–145)
TIBC SERPL-MCNC: 326 UG/DL (ref 240–450)
WBC # BLD AUTO: 5.2 X10(3) UL (ref 4–11)

## 2022-03-03 PROCEDURE — 82728 ASSAY OF FERRITIN: CPT | Performed by: INTERNAL MEDICINE

## 2022-03-03 PROCEDURE — 5A1D70Z PERFORMANCE OF URINARY FILTRATION, INTERMITTENT, LESS THAN 6 HOURS PER DAY: ICD-10-PCS | Performed by: INTERNAL MEDICINE

## 2022-03-03 PROCEDURE — 80069 RENAL FUNCTION PANEL: CPT | Performed by: INTERNAL MEDICINE

## 2022-03-03 PROCEDURE — 82962 GLUCOSE BLOOD TEST: CPT

## 2022-03-03 PROCEDURE — 83540 ASSAY OF IRON: CPT | Performed by: INTERNAL MEDICINE

## 2022-03-03 PROCEDURE — 90935 HEMODIALYSIS ONE EVALUATION: CPT

## 2022-03-03 PROCEDURE — 84466 ASSAY OF TRANSFERRIN: CPT | Performed by: INTERNAL MEDICINE

## 2022-03-03 PROCEDURE — 85025 COMPLETE CBC W/AUTO DIFF WBC: CPT | Performed by: INTERNAL MEDICINE

## 2022-03-03 RX ORDER — ASPIRIN 81 MG/1
81 TABLET ORAL DAILY
Status: DISCONTINUED | OUTPATIENT
Start: 2022-03-03 | End: 2022-03-04

## 2022-03-03 RX ORDER — METOPROLOL SUCCINATE 50 MG/1
50 TABLET, EXTENDED RELEASE ORAL DAILY
Status: DISCONTINUED | OUTPATIENT
Start: 2022-03-03 | End: 2022-03-04

## 2022-03-03 RX ORDER — ATORVASTATIN CALCIUM 40 MG/1
80 TABLET, FILM COATED ORAL NIGHTLY
Status: DISCONTINUED | OUTPATIENT
Start: 2022-03-03 | End: 2022-03-04

## 2022-03-03 RX ORDER — ATORVASTATIN CALCIUM 40 MG/1
80 TABLET, FILM COATED ORAL DAILY
Status: DISCONTINUED | OUTPATIENT
Start: 2022-03-03 | End: 2022-03-03

## 2022-03-03 RX ORDER — CLOPIDOGREL BISULFATE 75 MG/1
75 TABLET ORAL DAILY
Status: DISCONTINUED | OUTPATIENT
Start: 2022-03-03 | End: 2022-03-04

## 2022-03-03 RX ORDER — HEPARIN SODIUM 1000 [USP'U]/ML
1.5 INJECTION, SOLUTION INTRAVENOUS; SUBCUTANEOUS
Status: DISCONTINUED | OUTPATIENT
Start: 2022-03-03 | End: 2022-03-04

## 2022-03-03 RX ORDER — DOCUSATE SODIUM 100 MG/1
100 CAPSULE, LIQUID FILLED ORAL 2 TIMES DAILY PRN
Status: DISCONTINUED | OUTPATIENT
Start: 2022-03-03 | End: 2022-03-04

## 2022-03-03 RX ORDER — HEPARIN SODIUM 1000 [USP'U]/ML
1.5 INJECTION, SOLUTION INTRAVENOUS; SUBCUTANEOUS
Status: DISCONTINUED | OUTPATIENT
Start: 2022-03-04 | End: 2022-03-03

## 2022-03-03 NOTE — IVS NOTE
Hand-Off     Procedure hand off report given to Becka Gupta RN. Pt's vital signs are stable. Procedural access site is dry and intact with no signs and symptoms of bleeding and hematoma. Patient denies any pain  Patient transferred with family/belongings and tele box on.

## 2022-03-03 NOTE — PLAN OF CARE
Patient had first HD treatment today and stated he felt tired after. Up with assist to the chair today for lunch with a fairly good appetite. Denies pain. Discussed order for the next HD treatment tomorrow. Problem: Patient Centered Care  Goal: Patient preferences are identified and integrated in the patient's plan of care  Description: Interventions:  - What would you like us to know as we care for you?  I have a family hx of cancer  - Provide timely, complete, and accurate information to patient/family  - Incorporate patient and family knowledge, values, beliefs, and cultural backgrounds into the planning and delivery of care  - Encourage patient/family to participate in care and decision-making at the level they choose  - Honor patient and family perspectives and choices  Outcome: Progressing     Problem: Patient/Family Goals  Goal: Patient/Family Long Term Goal  Description: Patient's Long Term Goal: Feel better than I am today    Interventions:  - Monitor vital signs  - Monitor appropriate labs  - Pain management as needed  - Administer medications per order  - Follow MD orders  - Diagnostics per order  - Fall precautions  - Activity as tolerated  - Assist with activities of daily living as needed  - Update / inform patient and family on plan of care  - Discharge planning   - See additional Care Plan goals for specific interventions  Outcome: Progressing     Problem: METABOLIC/FLUID AND ELECTROLYTES - ADULT  Goal: Electrolytes maintained within normal limits  Description: INTERVENTIONS:  - Monitor labs and rhythm and assess patient for signs and symptoms of electrolyte imbalances  - Administer electrolyte replacement as ordered  - Monitor response to electrolyte replacements, including rhythm and repeat lab results as appropriate  - Fluid restriction as ordered  - Instruct patient on fluid and nutrition restrictions as appropriate  Outcome: Progressing  Goal: Hemodynamic stability and optimal renal function maintained  Description: INTERVENTIONS:  - Monitor labs and assess for signs and symptoms of volume excess or deficit  - Monitor intake, output and patient weight  - Monitor urine specific gravity, serum osmolarity and serum sodium as indicated or ordered  - Monitor response to interventions for patient's volume status, including labs, urine output, blood pressure (other measures as available)  - Encourage oral intake as appropriate  - Instruct patient on fluid and nutrition restrictions as appropriate  Outcome: Progressing     Problem: PAIN - ADULT  Goal: Verbalizes/displays adequate comfort level or patient's stated pain goal  Description: INTERVENTIONS:  - Encourage pt to monitor pain and request assistance  - Assess pain using appropriate pain scale  - Administer analgesics based on type and severity of pain and evaluate response  - Implement non-pharmacological measures as appropriate and evaluate response  - Consider cultural and social influences on pain and pain management  - Manage/alleviate anxiety  - Utilize distraction and/or relaxation techniques  - Monitor for opioid side effects  - Notify MD/LIP if interventions unsuccessful or patient reports new pain  - Anticipate increased pain with activity and pre-medicate as appropriate  Outcome: Progressing     Problem: RISK FOR INFECTION - ADULT  Goal: Absence of fever/infection during anticipated neutropenic period  Description: INTERVENTIONS  - Monitor WBC  - Administer growth factors as ordered  - Implement neutropenic guidelines  Outcome: Progressing     Problem: SAFETY ADULT - FALL  Goal: Free from fall injury  Description: INTERVENTIONS:  - Assess pt frequently for physical needs  - Identify cognitive and physical deficits and behaviors that affect risk of falls.   - [place] fall precautions as indicated by assessment.  - Educate pt/family on patient safety including physical limitations  - Instruct pt to call for assistance with activity based on assessment  - Modify environment to reduce risk of injury  - Provide assistive devices as appropriate  - Consider OT/PT consult to assist with strengthening/mobility  - Encourage toileting schedule  Outcome: Progressing     Problem: Diabetes/Glucose Control  Goal: Glucose maintained within prescribed range  Description: INTERVENTIONS:  - Monitor Blood Glucose as ordered  - Assess for signs and symptoms of hyperglycemia and hypoglycemia  - Administer ordered medications to maintain glucose within target range  - Assess barriers to adequate nutritional intake and initiate nutrition consult as needed  - Instruct patient on self management of diabetes  Outcome: Not Progressing     Problem: Patient/Family Goals  Goal: Patient/Family Short Term Goal  Description: Patient's Short Term Goal: Go home soon    Interventions: - Monitor vital signs  - Monitor appropriate labs  - Pain management as needed  - Administer medications per order  - Follow MD orders  - Diagnostics per order  - Fall precautions  - Activity as tolerated  - Assist with activities of daily living as needed  - Update / inform patient and family on plan of care   - See additional Care Plan goals for specific interventions  Outcome: Not Progressing     Problem: METABOLIC/FLUID AND ELECTROLYTES - ADULT  Goal: Glucose maintained within prescribed range  Description: INTERVENTIONS:  - Monitor Blood Glucose as ordered  - Assess for signs and symptoms of hyperglycemia and hypoglycemia  - Administer ordered medications to maintain glucose within target range  - Assess barriers to adequate nutritional intake and initiate nutrition consult as needed  - Instruct patient on self management of diabetes  Outcome: Not Progressing     Problem: DISCHARGE PLANNING  Goal: Discharge to home or other facility with appropriate resources  Description: INTERVENTIONS:  - Identify barriers to discharge w/pt and caregiver  - Include patient/family/discharge partner in discharge planning  - Arrange for needed discharge resources and transportation as appropriate  - Identify discharge learning needs (meds, wound care, etc)  - Arrange for interpreters to assist at discharge as needed  - Consider post-discharge preferences of patient/family/discharge partner  - Complete POLST form as appropriate  - Assess patient's ability to be responsible for managing their own health  - Refer to Case Management Department for coordinating discharge planning if the patient needs post-hospital services based on physician/LIP order or complex needs related to functional status, cognitive ability or social support system  Outcome: Not Progressing

## 2022-03-03 NOTE — PROGRESS NOTES
Therapeutic substitution for bimatoprost to latanoprost per P&T approved protocol.     Ricky Appiah PharmD, 03/02/22, 10:19 PM

## 2022-03-03 NOTE — PLAN OF CARE
Problem: Patient Centered Care  Goal: Patient preferences are identified and integrated in the patient's plan of care  Description: Interventions:  - What would you like us to know as we care for you?  I have a family hx of cancer  - Provide timely, complete, and accurate information to patient/family  - Incorporate patient and family knowledge, values, beliefs, and cultural backgrounds into the planning and delivery of care  - Encourage patient/family to participate in care and decision-making at the level they choose  - Honor patient and family perspectives and choices  Outcome: Progressing     Problem: Diabetes/Glucose Control  Goal: Glucose maintained within prescribed range  Description: INTERVENTIONS:  - Monitor Blood Glucose as ordered  - Assess for signs and symptoms of hyperglycemia and hypoglycemia  - Administer ordered medications to maintain glucose within target range  - Assess barriers to adequate nutritional intake and initiate nutrition consult as needed  - Instruct patient on self management of diabetes  Outcome: Progressing     Problem: Patient/Family Goals  Goal: Patient/Family Long Term Goal  Description: Patient's Long Term Goal: Feel better than I am today    Interventions:  - Monitor vital signs  - Monitor appropriate labs  - Pain management as needed  - Administer medications per order  - Follow MD orders  - Diagnostics per order  - Fall precautions  - Activity as tolerated  - Assist with activities of daily living as needed  - Update / inform patient and family on plan of care  - Discharge planning   - See additional Care Plan goals for specific interventions  Outcome: Progressing  Goal: Patient/Family Short Term Goal  Description: Patient's Short Term Goal: Go home soon    Interventions: - Monitor vital signs  - Monitor appropriate labs  - Pain management as needed  - Administer medications per order  - Follow MD orders  - Diagnostics per order  - Fall precautions  - Activity as tolerated  - Assist with activities of daily living as needed  - Update / inform patient and family on plan of care   - See additional Care Plan goals for specific interventions  Outcome: Progressing     Problem: METABOLIC/FLUID AND ELECTROLYTES - ADULT  Goal: Glucose maintained within prescribed range  Description: INTERVENTIONS:  - Monitor Blood Glucose as ordered  - Assess for signs and symptoms of hyperglycemia and hypoglycemia  - Administer ordered medications to maintain glucose within target range  - Assess barriers to adequate nutritional intake and initiate nutrition consult as needed  - Instruct patient on self management of diabetes  Outcome: Progressing  Goal: Electrolytes maintained within normal limits  Description: INTERVENTIONS:  - Monitor labs and rhythm and assess patient for signs and symptoms of electrolyte imbalances  - Administer electrolyte replacement as ordered  - Monitor response to electrolyte replacements, including rhythm and repeat lab results as appropriate  - Fluid restriction as ordered  - Instruct patient on fluid and nutrition restrictions as appropriate  Outcome: Progressing  Goal: Hemodynamic stability and optimal renal function maintained  Description: INTERVENTIONS:  - Monitor labs and assess for signs and symptoms of volume excess or deficit  - Monitor intake, output and patient weight  - Monitor urine specific gravity, serum osmolarity and serum sodium as indicated or ordered  - Monitor response to interventions for patient's volume status, including labs, urine output, blood pressure (other measures as available)  - Encourage oral intake as appropriate  - Instruct patient on fluid and nutrition restrictions as appropriate  Outcome: Progressing     Problem: PAIN - ADULT  Goal: Verbalizes/displays adequate comfort level or patient's stated pain goal  Description: INTERVENTIONS:  - Encourage pt to monitor pain and request assistance  - Assess pain using appropriate pain scale  - Administer analgesics based on type and severity of pain and evaluate response  - Implement non-pharmacological measures as appropriate and evaluate response  - Consider cultural and social influences on pain and pain management  - Manage/alleviate anxiety  - Utilize distraction and/or relaxation techniques  - Monitor for opioid side effects  - Notify MD/LIP if interventions unsuccessful or patient reports new pain  - Anticipate increased pain with activity and pre-medicate as appropriate  Outcome: Progressing     Problem: RISK FOR INFECTION - ADULT  Goal: Absence of fever/infection during anticipated neutropenic period  Description: INTERVENTIONS  - Monitor WBC  - Administer growth factors as ordered  - Implement neutropenic guidelines  Outcome: Progressing     Problem: SAFETY ADULT - FALL  Goal: Free from fall injury  Description: INTERVENTIONS:  - Assess pt frequently for physical needs  - Identify cognitive and physical deficits and behaviors that affect risk of falls.   - [place] fall precautions as indicated by assessment.  - Educate pt/family on patient safety including physical limitations  - Instruct pt to call for assistance with activity based on assessment  - Modify environment to reduce risk of injury  - Provide assistive devices as appropriate  - Consider OT/PT consult to assist with strengthening/mobility  - Encourage toileting schedule  Outcome: Progressing     Problem: DISCHARGE PLANNING  Goal: Discharge to home or other facility with appropriate resources  Description: INTERVENTIONS:  - Identify barriers to discharge w/pt and caregiver  - Include patient/family/discharge partner in discharge planning  - Arrange for needed discharge resources and transportation as appropriate  - Identify discharge learning needs (meds, wound care, etc)  - Arrange for interpreters to assist at discharge as needed  - Consider post-discharge preferences of patient/family/discharge partner  - Complete POLST form as appropriate  - Assess patient's ability to be responsible for managing their own health  - Refer to Case Management Department for coordinating discharge planning if the patient needs post-hospital services based on physician/LIP order or complex needs related to functional status, cognitive ability or social support system  Outcome: Progressing   Patient currently stable at this time. vital signs currently stable. Fall and safety precautions in place. Bed alarm in lowest setting, bed alarm in place. Wife at bedside. Call light and personal belongings within reach.

## 2022-03-03 NOTE — CM/SW NOTE
03/03/22 0800   CM/SW Referral Data   Referral Source Physician   Reason for Referral Discharge planning; Advance Directives/POLST   Informant Spouse/Significant Other;EMR   Pertinent Medical Hx   Does patient have an established PCP? Yes  Estrella Pruitt)   Significant Past Medical/Mental Health Hx ESRD, kidney transplant scheduled for 3/14   Patient Info   Advanced directives? No   Information provided? Yes   Patient's Current Mental Status at Time of Assessment Alert;Oriented   Patient's [address]   Patient lives with Spouse/Significant other   Patient Status Prior to Admission   Independent with ADLs and Mobility Yes   Discharge Needs   Anticipated D/C needs Outpatient dialysis   Choice of Post-Acute Provider   Informed patient of right to choose their preferred provider Yes     Pt discussed during nursing rounds. Dx ESRD on HD. Pt currently receiving inpatient dialysis as of the time of this note, but spouse Nghia Ramos at bedside. From home w/spouse, independent prior to hospitalization. Pt is scheduled to have renal transplant on 3/14 at [address] [place] [address], pt's son is donor. MDO received for discharge planning, specific to outpatient dialysis which will be needed at [facility]. Referrals sent in [facility]. MDO received for Advanced Directives. Paperwork provided to spouse Nghia Ramos who will look over and let CM know when it can be completed and added to patient's medical record. Plan: Home w/outpatient dialysis services pending facility choice and medical clearance. / to remain available for support and/or discharge planning.      JOSE G Del Angel    [phone]

## 2022-03-04 VITALS
SYSTOLIC BLOOD PRESSURE: 99 MMHG | HEIGHT: 61 IN | HEART RATE: 96 BPM | WEIGHT: 198 LBS | RESPIRATION RATE: 18 BRPM | TEMPERATURE: 98 F | DIASTOLIC BLOOD PRESSURE: 63 MMHG | BODY MASS INDEX: 37.38 KG/M2 | OXYGEN SATURATION: 100 %

## 2022-03-04 LAB
ALBUMIN SERPL-MCNC: 2.9 G/DL (ref 3.4–5)
ANION GAP SERPL CALC-SCNC: 6 MMOL/L (ref 0–18)
BASOPHILS # BLD AUTO: 0.05 X10(3) UL (ref 0–0.2)
BASOPHILS NFR BLD AUTO: 0.8 %
BUN BLD-MCNC: 55 MG/DL (ref 7–18)
BUN/CREAT SERPL: 10.6 (ref 10–20)
CALCIUM BLD-MCNC: 7.6 MG/DL (ref 8.5–10.1)
CHLORIDE SERPL-SCNC: 107 MMOL/L (ref 98–112)
CO2 SERPL-SCNC: 24 MMOL/L (ref 21–32)
CREAT BLD-MCNC: 5.2 MG/DL
DEPRECATED RDW RBC AUTO: 44.8 FL (ref 35.1–46.3)
EOSINOPHIL # BLD AUTO: 0.15 X10(3) UL (ref 0–0.7)
EOSINOPHIL NFR BLD AUTO: 2.4 %
GLUCOSE BLD-MCNC: 104 MG/DL (ref 70–99)
GLUCOSE BLDC GLUCOMTR-MCNC: 112 MG/DL (ref 70–99)
HCT VFR BLD AUTO: 28.6 %
HGB BLD-MCNC: 9.3 G/DL
IMM GRANULOCYTES # BLD AUTO: 0.03 X10(3) UL (ref 0–1)
IMM GRANULOCYTES NFR BLD: 0.5 %
LYMPHOCYTES # BLD AUTO: 1.4 X10(3) UL (ref 1–4)
LYMPHOCYTES NFR BLD AUTO: 22 %
MCH RBC QN AUTO: 30.5 PG (ref 26–34)
MCHC RBC AUTO-ENTMCNC: 32.5 G/DL (ref 31–37)
MONOCYTES # BLD AUTO: 0.58 X10(3) UL (ref 0.1–1)
MONOCYTES NFR BLD AUTO: 9.1 %
NEUTROPHILS # BLD AUTO: 4.14 X10 (3) UL (ref 1.5–7.7)
NEUTROPHILS # BLD AUTO: 4.14 X10(3) UL (ref 1.5–7.7)
NEUTROPHILS NFR BLD AUTO: 65.2 %
OSMOLALITY SERPL CALC.SUM OF ELEC: 299 MOSM/KG (ref 275–295)
PHOSPHATE SERPL-MCNC: 6 MG/DL (ref 2.5–4.9)
PLATELET # BLD AUTO: 125 10(3)UL (ref 150–450)
POTASSIUM SERPL-SCNC: 3.5 MMOL/L (ref 3.5–5.1)
SODIUM SERPL-SCNC: 137 MMOL/L (ref 136–145)
WBC # BLD AUTO: 6.4 X10(3) UL (ref 4–11)

## 2022-03-04 PROCEDURE — 82962 GLUCOSE BLOOD TEST: CPT

## 2022-03-04 PROCEDURE — 5A1D70Z PERFORMANCE OF URINARY FILTRATION, INTERMITTENT, LESS THAN 6 HOURS PER DAY: ICD-10-PCS | Performed by: INTERNAL MEDICINE

## 2022-03-04 PROCEDURE — 85025 COMPLETE CBC W/AUTO DIFF WBC: CPT | Performed by: INTERNAL MEDICINE

## 2022-03-04 PROCEDURE — 80069 RENAL FUNCTION PANEL: CPT | Performed by: INTERNAL MEDICINE

## 2022-03-04 PROCEDURE — 86480 TB TEST CELL IMMUN MEASURE: CPT | Performed by: INTERNAL MEDICINE

## 2022-03-04 PROCEDURE — 90935 HEMODIALYSIS ONE EVALUATION: CPT

## 2022-03-04 NOTE — CM/SW NOTE
03/04/22 1200   Discharge disposition   Expected discharge disposition Home or Self   Outpatient services Dialysis  ([address] [place] [address] at 3pm)   Discharge transportation Private car     Pt discussed during nursing rounds. Pt is stable for [facility] today. MD blake order entered. Outpatient dialysis will be provided by [address] [place] [address] at 3pm, pt notified and agreeable to start services on 3/7. Advanced Directive papework completed as ordered, copy sent to registration to be entered in patient's medical record. Pt's spouse will provide transport at [facility]. Plan: Home w/outpatient HD at [address] [place] [address] at [facility] 230 worker/ to remain available for support and/or discharge planning.      JOSE G Myers    [phone]

## 2022-03-04 NOTE — PLAN OF CARE
Patient with no complaints of pain. Tolerated dialysis fairly well. HD scheduled for tomorrow. Reinforced teachings by HD RN. Vitals stable. Problem: Patient Centered Care  Goal: Patient preferences are identified and integrated in the patient's plan of care  Description: Interventions:  - What would you like us to know as we care for you?  I have a family hx of cancer  - Provide timely, complete, and accurate information to patient/family  - Incorporate patient and family knowledge, values, beliefs, and cultural backgrounds into the planning and delivery of care  - Encourage patient/family to participate in care and decision-making at the level they choose  - Honor patient and family perspectives and choices  Outcome: Progressing     Problem: Patient/Family Goals  Goal: Patient/Family Long Term Goal  Description: Patient's Long Term Goal: Feel better than I am today    Interventions:  - Monitor vital signs  - Monitor appropriate labs  - Pain management as needed  - Administer medications per order  - Follow MD orders  - Diagnostics per order  - Fall precautions  - Activity as tolerated  - Assist with activities of daily living as needed  - Update / inform patient and family on plan of care  - Discharge planning   - See additional Care Plan goals for specific interventions  Outcome: Progressing  Goal: Patient/Family Short Term Goal  Description: Patient's Short Term Goal: Go home soon    Interventions: - Monitor vital signs  - Monitor appropriate labs  - Pain management as needed  - Administer medications per order  - Follow MD orders  - Diagnostics per order  - Fall precautions  - Activity as tolerated  - Assist with activities of daily living as needed  - Update / inform patient and family on plan of care   - See additional Care Plan goals for specific interventions  Outcome: Progressing     Problem: METABOLIC/FLUID AND ELECTROLYTES - ADULT  Goal: Electrolytes maintained within normal limits  Description: INTERVENTIONS:  - Monitor labs and rhythm and assess patient for signs and symptoms of electrolyte imbalances  - Administer electrolyte replacement as ordered  - Monitor response to electrolyte replacements, including rhythm and repeat lab results as appropriate  - Fluid restriction as ordered  - Instruct patient on fluid and nutrition restrictions as appropriate  Outcome: Progressing     Problem: PAIN - ADULT  Goal: Verbalizes/displays adequate comfort level or patient's stated pain goal  Description: INTERVENTIONS:  - Encourage pt to monitor pain and request assistance  - Assess pain using appropriate pain scale  - Administer analgesics based on type and severity of pain and evaluate response  - Implement non-pharmacological measures as appropriate and evaluate response  - Consider cultural and social influences on pain and pain management  - Manage/alleviate anxiety  - Utilize distraction and/or relaxation techniques  - Monitor for opioid side effects  - Notify MD/LIP if interventions unsuccessful or patient reports new pain  - Anticipate increased pain with activity and pre-medicate as appropriate  Outcome: Progressing     Problem: RISK FOR INFECTION - ADULT  Goal: Absence of fever/infection during anticipated neutropenic period  Description: INTERVENTIONS  - Monitor WBC  - Administer growth factors as ordered  - Implement neutropenic guidelines  Outcome: Progressing     Problem: SAFETY ADULT - FALL  Goal: Free from fall injury  Description: INTERVENTIONS:  - Assess pt frequently for physical needs  - Identify cognitive and physical deficits and behaviors that affect risk of falls.   - [place] fall precautions as indicated by assessment.  - Educate pt/family on patient safety including physical limitations  - Instruct pt to call for assistance with activity based on assessment  - Modify environment to reduce risk of injury  - Provide assistive devices as appropriate  - Consider OT/PT consult to assist with strengthening/mobility  - Encourage toileting schedule  Outcome: Progressing     Problem: DISCHARGE PLANNING  Goal: Discharge to home or other facility with appropriate resources  Description: INTERVENTIONS:  - Identify barriers to discharge w/pt and caregiver  - Include patient/family/discharge partner in discharge planning  - Arrange for needed discharge resources and transportation as appropriate  - Identify discharge learning needs (meds, wound care, etc)  - Arrange for interpreters to assist at discharge as needed  - Consider post-discharge preferences of patient/family/discharge partner  - Complete POLST form as appropriate  - Assess patient's ability to be responsible for managing their own health  - Refer to Case Management Department for coordinating discharge planning if the patient needs post-hospital services based on physician/LIP order or complex needs related to functional status, cognitive ability or social support system  Outcome: Progressing     Problem: Diabetes/Glucose Control  Goal: Glucose maintained within prescribed range  Description: INTERVENTIONS:  - Monitor Blood Glucose as ordered  - Assess for signs and symptoms of hyperglycemia and hypoglycemia  - Administer ordered medications to maintain glucose within target range  - Assess barriers to adequate nutritional intake and initiate nutrition consult as needed  - Instruct patient on self management of diabetes  Outcome: Not Progressing     Problem: METABOLIC/FLUID AND ELECTROLYTES - ADULT  Goal: Glucose maintained within prescribed range  Description: INTERVENTIONS:  - Monitor Blood Glucose as ordered  - Assess for signs and symptoms of hyperglycemia and hypoglycemia  - Administer ordered medications to maintain glucose within target range  - Assess barriers to adequate nutritional intake and initiate nutrition consult as needed  - Instruct patient on self management of diabetes  Outcome: Not Progressing  Goal: Hemodynamic stability and optimal renal function maintained  Description: INTERVENTIONS:  - Monitor labs and assess for signs and symptoms of volume excess or deficit  - Monitor intake, output and patient weight  - Monitor urine specific gravity, serum osmolarity and serum sodium as indicated or ordered  - Monitor response to interventions for patient's volume status, including labs, urine output, blood pressure (other measures as available)  - Encourage oral intake as appropriate  - Instruct patient on fluid and nutrition restrictions as appropriate  Outcome: Not Progressing

## 2022-03-04 NOTE — PLAN OF CARE
Problem: Patient Centered Care  Goal: Patient preferences are identified and integrated in the patient's plan of care  Description: Interventions:  - What would you like us to know as we care for you?  I have a family hx of cancer  - Provide timely, complete, and accurate information to patient/family  - Incorporate patient and family knowledge, values, beliefs, and cultural backgrounds into the planning and delivery of care  - Encourage patient/family to participate in care and decision-making at the level they choose  - Honor patient and family perspectives and choices  Outcome: Adequate for Discharge     Problem: Diabetes/Glucose Control  Goal: Glucose maintained within prescribed range  Description: INTERVENTIONS:  - Monitor Blood Glucose as ordered  - Assess for signs and symptoms of hyperglycemia and hypoglycemia  - Administer ordered medications to maintain glucose within target range  - Assess barriers to adequate nutritional intake and initiate nutrition consult as needed  - Instruct patient on self management of diabetes  Outcome: Adequate for Discharge     Problem: Patient/Family Goals  Goal: Patient/Family Long Term Goal  Description: Patient's Long Term Goal: Feel better than I am today    Interventions:  - Monitor vital signs  - Monitor appropriate labs  - Pain management as needed  - Administer medications per order  - Follow MD orders  - Diagnostics per order  - Fall precautions  - Activity as tolerated  - Assist with activities of daily living as needed  - Update / inform patient and family on plan of care  - Discharge planning   - See additional Care Plan goals for specific interventions  Outcome: Adequate for Discharge  Goal: Patient/Family Short Term Goal  Description: Patient's Short Term Goal: Go home soon    Interventions: - Monitor vital signs  - Monitor appropriate labs  - Pain management as needed  - Administer medications per order  - Follow MD orders  - Diagnostics per order  - Fall precautions  - Activity as tolerated  - Assist with activities of daily living as needed  - Update / inform patient and family on plan of care   - See additional Care Plan goals for specific interventions  Outcome: Adequate for Discharge     Problem: METABOLIC/FLUID AND ELECTROLYTES - ADULT  Goal: Glucose maintained within prescribed range  Description: INTERVENTIONS:  - Monitor Blood Glucose as ordered  - Assess for signs and symptoms of hyperglycemia and hypoglycemia  - Administer ordered medications to maintain glucose within target range  - Assess barriers to adequate nutritional intake and initiate nutrition consult as needed  - Instruct patient on self management of diabetes  Outcome: Adequate for Discharge  Goal: Electrolytes maintained within normal limits  Description: INTERVENTIONS:  - Monitor Blood Glucose as ordered  - Assess for signs and symptoms of hyperglycemia and hypoglycemia  - Administer ordered medications to maintain glucose within target range  - Assess barriers to adequate nutritional intake and initiate nutrition consult as needed  - Instruct patient on self management of diabetes  Outcome: Adequate for Discharge  Goal: Hemodynamic stability and optimal renal function maintained  Description: INTERVENTIONS:  - Monitor labs and rhythm and assess patient for signs and symptoms of electrolyte imbalances  - Administer electrolyte replacement as ordered  - Monitor response to electrolyte replacements, including rhythm and repeat lab results as appropriate  - Fluid restriction as ordered  - Instruct patient on fluid and nutrition restrictions as appropriate  Outcome: Adequate for Discharge     Problem: PAIN - ADULT  Goal: Verbalizes/displays adequate comfort level or patient's stated pain goal  Description: INTERVENTIONS:  - Encourage pt to monitor pain and request assistance  - Assess pain using appropriate pain scale  - Administer analgesics based on type and severity of pain and evaluate response  - Implement non-pharmacological measures as appropriate and evaluate response  - Consider cultural and social influences on pain and pain management  - Manage/alleviate anxiety  - Utilize distraction and/or relaxation techniques  - Monitor for opioid side effects  - Notify MD/LIP if interventions unsuccessful or patient reports new pain  - Anticipate increased pain with activity and pre-medicate as appropriate  Outcome: Adequate for Discharge  HD done today per HD RN patient educated on fluid overload and potassium levels and fluid intake-education reinforced by this RN at the bedside, call light left within reach. Problem: RISK FOR INFECTION - ADULT  Goal: Absence of fever/infection during anticipated neutropenic period  Description: INTERVENTIONS  - Monitor WBC  - Administer growth factors as ordered  - Implement neutropenic guidelines  Outcome: Adequate for Discharge     Problem: SAFETY ADULT - FALL  Goal: Free from fall injury  Description: INTERVENTIONS:  - Assess pt frequently for physical needs  - Identify cognitive and physical deficits and behaviors that affect risk of falls.   - [place] fall precautions as indicated by assessment.  - Educate pt/family on patient safety including physical limitations  - Instruct pt to call for assistance with activity based on assessment  - Modify environment to reduce risk of injury  - Provide assistive devices as appropriate  - Consider OT/PT consult to assist with strengthening/mobility  - Encourage toileting schedule  Outcome: Adequate for Discharge     Problem: DISCHARGE PLANNING  Goal: Discharge to home or other facility with appropriate resources  Description: INTERVENTIONS:  - Identify barriers to discharge w/pt and caregiver  - Include patient/family/discharge partner in discharge planning  - Arrange for needed discharge resources and transportation as appropriate  - Identify discharge learning needs (meds, wound care, etc)  - Arrange for interpreters to assist at discharge as needed  - Consider post-discharge preferences of patient/family/discharge partner  - Complete POLST form as appropriate  - Assess patient's ability to be responsible for managing their own health  - Refer to Case Management Department for coordinating discharge planning if the patient needs post-hospital services based on physician/LIP order or complex needs related to functional status, cognitive ability or social support system  Outcome: Adequate for Discharge

## 2022-03-07 LAB
M TB IFN-G CD4+ T-CELLS BLD-ACNC: 0.02 IU/ML
M TB TUBERC IFN-G BLD QL: NEGATIVE
M TB TUBERC IGNF/MITOGEN IGNF CONTROL: >10 IU/ML
QFT TB1 AG MINUS NIL: 0.02 IU/ML
QFT TB2 AG MINUS NIL: 0.01 IU/ML

## 2022-03-22 PROBLEM — N28.89 HYPERTENSION SECONDARY TO OTHER RENAL DISORDERS: Status: ACTIVE | Noted: 2022-03-07

## 2022-03-22 PROBLEM — N25.81 SECONDARY HYPERPARATHYROIDISM OF RENAL ORIGIN (HCC): Status: ACTIVE | Noted: 2022-03-07

## 2022-03-22 PROBLEM — E78.79: Status: ACTIVE | Noted: 2022-03-07

## 2022-03-22 PROBLEM — D50.0 IRON DEFICIENCY ANEMIA SECONDARY TO BLOOD LOSS (CHRONIC): Status: ACTIVE | Noted: 2022-03-07

## 2022-03-22 PROBLEM — H40.9: Status: ACTIVE | Noted: 2022-03-07

## 2022-03-22 PROBLEM — I15.1 HYPERTENSION SECONDARY TO OTHER RENAL DISORDERS: Status: ACTIVE | Noted: 2022-03-07

## 2022-03-22 PROBLEM — H26.233: Status: ACTIVE | Noted: 2022-03-07

## 2022-05-27 ENCOUNTER — ORDER TRANSCRIPTION (OUTPATIENT)
Dept: CARDIAC REHAB | Facility: HOSPITAL | Age: 57
End: 2022-05-27

## 2022-05-27 DIAGNOSIS — Z95.1 S/P CABG (CORONARY ARTERY BYPASS GRAFT): Primary | ICD-10-CM

## 2022-06-07 ENCOUNTER — CARDPULM VISIT (OUTPATIENT)
Dept: CARDIAC REHAB | Facility: HOSPITAL | Age: 57
End: 2022-06-07
Attending: INTERNAL MEDICINE
Payer: MEDICARE

## 2022-06-07 LAB
GLUCOSE BLD-MCNC: 67 MG/DL (ref 70–99)
GLUCOSE BLD-MCNC: 96 MG/DL (ref 70–99)

## 2022-06-07 PROCEDURE — 82962 GLUCOSE BLOOD TEST: CPT

## 2022-06-07 PROCEDURE — 93798 PHYS/QHP OP CAR RHAB W/ECG: CPT

## 2022-06-13 PROBLEM — E78.49 OTHER HYPERLIPIDEMIA: Status: ACTIVE | Noted: 2022-05-20

## 2022-06-13 PROBLEM — I44.1 AV BLOCK, 2ND DEGREE: Status: ACTIVE | Noted: 2022-05-20

## 2022-06-13 PROBLEM — N17.9 ACUTE KIDNEY INJURY: Status: ACTIVE | Noted: 2022-05-20

## 2022-06-13 PROBLEM — R07.89 OTHER CHEST PAIN: Status: ACTIVE | Noted: 2022-05-20

## 2022-06-13 PROBLEM — E11.65 POORLY CONTROLLED DIABETES MELLITUS (HCC): Status: ACTIVE | Noted: 2022-05-20

## 2022-06-13 PROBLEM — R79.89 ELEVATED D-DIMER: Status: ACTIVE | Noted: 2022-05-20

## 2022-06-13 PROBLEM — E66.9 CLASS 2 OBESITY: Status: ACTIVE | Noted: 2022-05-20

## 2022-06-13 PROBLEM — E66.812 CLASS 2 OBESITY: Status: ACTIVE | Noted: 2022-05-20

## 2022-06-13 PROBLEM — I25.10 ATHEROSCLEROSIS OF CORONARY ARTERY: Status: ACTIVE | Noted: 2022-05-20

## 2022-06-13 PROBLEM — N17.9 ACUTE KIDNEY INJURY (HCC): Status: ACTIVE | Noted: 2022-05-20

## 2022-06-14 ENCOUNTER — APPOINTMENT (OUTPATIENT)
Dept: CARDIAC REHAB | Facility: HOSPITAL | Age: 57
End: 2022-06-14
Attending: INTERNAL MEDICINE
Payer: MEDICARE

## 2022-06-16 ENCOUNTER — APPOINTMENT (OUTPATIENT)
Dept: CARDIAC REHAB | Facility: HOSPITAL | Age: 57
End: 2022-06-16
Attending: INTERNAL MEDICINE
Payer: MEDICARE

## 2022-06-21 ENCOUNTER — CARDPULM VISIT (OUTPATIENT)
Dept: CARDIAC REHAB | Facility: HOSPITAL | Age: 57
End: 2022-06-21
Attending: INTERNAL MEDICINE
Payer: MEDICARE

## 2022-06-21 PROCEDURE — 93798 PHYS/QHP OP CAR RHAB W/ECG: CPT

## 2022-06-23 ENCOUNTER — APPOINTMENT (OUTPATIENT)
Dept: CARDIAC REHAB | Facility: HOSPITAL | Age: 57
End: 2022-06-23
Attending: INTERNAL MEDICINE
Payer: MEDICARE

## 2022-06-28 ENCOUNTER — CARDPULM VISIT (OUTPATIENT)
Dept: CARDIAC REHAB | Facility: HOSPITAL | Age: 57
End: 2022-06-28
Attending: INTERNAL MEDICINE
Payer: MEDICARE

## 2022-06-28 PROCEDURE — 93798 PHYS/QHP OP CAR RHAB W/ECG: CPT

## 2022-06-30 ENCOUNTER — APPOINTMENT (OUTPATIENT)
Dept: CARDIAC REHAB | Facility: HOSPITAL | Age: 57
End: 2022-06-30
Attending: INTERNAL MEDICINE
Payer: MEDICARE

## 2022-07-05 ENCOUNTER — CARDPULM VISIT (OUTPATIENT)
Dept: CARDIAC REHAB | Facility: HOSPITAL | Age: 57
End: 2022-07-05
Attending: INTERNAL MEDICINE
Payer: MEDICARE

## 2022-07-05 PROCEDURE — 93798 PHYS/QHP OP CAR RHAB W/ECG: CPT

## 2022-07-07 ENCOUNTER — CARDPULM VISIT (OUTPATIENT)
Dept: CARDIAC REHAB | Facility: HOSPITAL | Age: 57
End: 2022-07-07
Attending: INTERNAL MEDICINE
Payer: MEDICARE

## 2022-07-07 LAB — GLUCOSE BLD-MCNC: 137 MG/DL (ref 70–99)

## 2022-07-07 PROCEDURE — 93798 PHYS/QHP OP CAR RHAB W/ECG: CPT

## 2022-07-12 ENCOUNTER — CARDPULM VISIT (OUTPATIENT)
Dept: CARDIAC REHAB | Facility: HOSPITAL | Age: 57
End: 2022-07-12
Attending: INTERNAL MEDICINE
Payer: MEDICARE

## 2022-07-12 PROCEDURE — 93798 PHYS/QHP OP CAR RHAB W/ECG: CPT

## 2022-07-14 ENCOUNTER — CARDPULM VISIT (OUTPATIENT)
Dept: CARDIAC REHAB | Facility: HOSPITAL | Age: 57
End: 2022-07-14
Attending: INTERNAL MEDICINE
Payer: MEDICARE

## 2022-07-14 PROCEDURE — 93798 PHYS/QHP OP CAR RHAB W/ECG: CPT

## 2022-07-19 ENCOUNTER — CARDPULM VISIT (OUTPATIENT)
Dept: CARDIAC REHAB | Facility: HOSPITAL | Age: 57
End: 2022-07-19
Attending: INTERNAL MEDICINE
Payer: MEDICARE

## 2022-07-19 PROCEDURE — 93798 PHYS/QHP OP CAR RHAB W/ECG: CPT

## 2022-07-21 ENCOUNTER — CARDPULM VISIT (OUTPATIENT)
Dept: CARDIAC REHAB | Facility: HOSPITAL | Age: 57
End: 2022-07-21
Attending: INTERNAL MEDICINE
Payer: MEDICARE

## 2022-07-21 PROCEDURE — 93798 PHYS/QHP OP CAR RHAB W/ECG: CPT

## 2022-07-26 ENCOUNTER — CARDPULM VISIT (OUTPATIENT)
Dept: CARDIAC REHAB | Facility: HOSPITAL | Age: 57
End: 2022-07-26
Attending: INTERNAL MEDICINE
Payer: MEDICARE

## 2022-07-26 PROCEDURE — 93798 PHYS/QHP OP CAR RHAB W/ECG: CPT

## 2022-07-28 ENCOUNTER — APPOINTMENT (OUTPATIENT)
Dept: CARDIAC REHAB | Facility: HOSPITAL | Age: 57
End: 2022-07-28
Attending: INTERNAL MEDICINE
Payer: MEDICARE

## 2022-07-28 PROCEDURE — 93798 PHYS/QHP OP CAR RHAB W/ECG: CPT

## 2022-08-01 ENCOUNTER — APPOINTMENT (OUTPATIENT)
Dept: CARDIAC REHAB | Facility: HOSPITAL | Age: 57
End: 2022-08-01
Attending: INTERNAL MEDICINE
Payer: MEDICARE

## 2022-08-02 ENCOUNTER — APPOINTMENT (OUTPATIENT)
Dept: CARDIAC REHAB | Facility: HOSPITAL | Age: 57
End: 2022-08-02
Attending: INTERNAL MEDICINE
Payer: MEDICARE

## 2022-08-02 PROCEDURE — 93798 PHYS/QHP OP CAR RHAB W/ECG: CPT

## 2022-08-03 ENCOUNTER — APPOINTMENT (OUTPATIENT)
Dept: CARDIAC REHAB | Facility: HOSPITAL | Age: 57
End: 2022-08-03
Attending: INTERNAL MEDICINE
Payer: MEDICARE

## 2022-08-04 ENCOUNTER — APPOINTMENT (OUTPATIENT)
Dept: CARDIAC REHAB | Facility: HOSPITAL | Age: 57
End: 2022-08-04
Attending: INTERNAL MEDICINE
Payer: MEDICARE

## 2022-08-04 PROCEDURE — 93798 PHYS/QHP OP CAR RHAB W/ECG: CPT

## 2022-08-08 ENCOUNTER — APPOINTMENT (OUTPATIENT)
Dept: CARDIAC REHAB | Facility: HOSPITAL | Age: 57
End: 2022-08-08
Attending: INTERNAL MEDICINE
Payer: MEDICARE

## 2022-08-09 ENCOUNTER — CARDPULM VISIT (OUTPATIENT)
Dept: CARDIAC REHAB | Facility: HOSPITAL | Age: 57
End: 2022-08-09
Attending: INTERNAL MEDICINE
Payer: MEDICARE

## 2022-08-09 PROCEDURE — 93798 PHYS/QHP OP CAR RHAB W/ECG: CPT

## 2022-08-10 ENCOUNTER — APPOINTMENT (OUTPATIENT)
Dept: CARDIAC REHAB | Facility: HOSPITAL | Age: 57
End: 2022-08-10
Attending: INTERNAL MEDICINE
Payer: MEDICARE

## 2022-08-11 ENCOUNTER — APPOINTMENT (OUTPATIENT)
Dept: CARDIAC REHAB | Facility: HOSPITAL | Age: 57
End: 2022-08-11
Attending: INTERNAL MEDICINE
Payer: MEDICARE

## 2022-08-11 PROCEDURE — 93798 PHYS/QHP OP CAR RHAB W/ECG: CPT

## 2022-08-15 ENCOUNTER — APPOINTMENT (OUTPATIENT)
Dept: CARDIAC REHAB | Facility: HOSPITAL | Age: 57
End: 2022-08-15
Attending: INTERNAL MEDICINE
Payer: MEDICARE

## 2022-08-16 ENCOUNTER — APPOINTMENT (OUTPATIENT)
Dept: CARDIAC REHAB | Facility: HOSPITAL | Age: 57
End: 2022-08-16
Attending: INTERNAL MEDICINE
Payer: MEDICARE

## 2022-08-16 PROCEDURE — 93798 PHYS/QHP OP CAR RHAB W/ECG: CPT

## 2022-08-17 ENCOUNTER — LAB ENCOUNTER (OUTPATIENT)
Dept: LAB | Age: 57
End: 2022-08-17
Attending: INTERNAL MEDICINE
Payer: MEDICARE

## 2022-08-17 ENCOUNTER — APPOINTMENT (OUTPATIENT)
Dept: CARDIAC REHAB | Facility: HOSPITAL | Age: 57
End: 2022-08-17
Attending: INTERNAL MEDICINE
Payer: MEDICARE

## 2022-08-17 DIAGNOSIS — R78.81 BACTEREMIA: ICD-10-CM

## 2022-08-17 DIAGNOSIS — B25.9 CMV INFECTION (HCC): ICD-10-CM

## 2022-08-17 DIAGNOSIS — D70.9 NEUTROPENIA (HCC): ICD-10-CM

## 2022-08-17 DIAGNOSIS — B34.8 BK VIREMIA: ICD-10-CM

## 2022-08-17 DIAGNOSIS — N39.0 URINARY TRACT INFECTION: ICD-10-CM

## 2022-08-17 DIAGNOSIS — E55.9 VITAMIN D DEFICIENCY: ICD-10-CM

## 2022-08-17 DIAGNOSIS — Z51.81 ENCOUNTER FOR THERAPEUTIC DRUG MONITORING: ICD-10-CM

## 2022-08-17 DIAGNOSIS — E78.5 HYPERLIPIDEMIA: ICD-10-CM

## 2022-08-17 DIAGNOSIS — R80.9 PROTEINURIA: ICD-10-CM

## 2022-08-17 DIAGNOSIS — Z94.0 KIDNEY TRANSPLANTED: ICD-10-CM

## 2022-08-17 DIAGNOSIS — E83.42 HYPOMAGNESEMIA: ICD-10-CM

## 2022-08-17 DIAGNOSIS — R73.9 HYPERGLYCEMIA: ICD-10-CM

## 2022-08-17 LAB
ANION GAP SERPL CALC-SCNC: 5 MMOL/L (ref 0–18)
BASOPHILS # BLD AUTO: 0.03 X10(3) UL (ref 0–0.2)
BASOPHILS NFR BLD AUTO: 2.4 %
BILIRUB UR QL STRIP.AUTO: NEGATIVE
BUN BLD-MCNC: 17 MG/DL (ref 7–18)
CALCIUM BLD-MCNC: 8.3 MG/DL (ref 8.5–10.1)
CHLORIDE SERPL-SCNC: 106 MMOL/L (ref 98–112)
CLARITY UR REFRACT.AUTO: CLEAR
CO2 SERPL-SCNC: 26 MMOL/L (ref 21–32)
COLOR UR AUTO: YELLOW
CREAT BLD-MCNC: 1.14 MG/DL
CREAT UR-SCNC: 120 MG/DL
CREAT UR-SCNC: 120 MG/DL
EOSINOPHIL # BLD AUTO: 0.03 X10(3) UL (ref 0–0.7)
EOSINOPHIL NFR BLD AUTO: 2.4 %
ERYTHROCYTE [DISTWIDTH] IN BLOOD BY AUTOMATED COUNT: 13.2 %
FASTING STATUS PATIENT QL REPORTED: NO
GFR SERPLBLD BASED ON 1.73 SQ M-ARVRAT: 76 ML/MIN/1.73M2 (ref 60–?)
GLUCOSE BLD-MCNC: 169 MG/DL (ref 70–99)
GLUCOSE UR STRIP.AUTO-MCNC: NEGATIVE MG/DL
HCT VFR BLD AUTO: 32 %
HGB BLD-MCNC: 10.1 G/DL
IMM GRANULOCYTES # BLD AUTO: 0 X10(3) UL (ref 0–1)
IMM GRANULOCYTES NFR BLD: 0 %
KETONES UR STRIP.AUTO-MCNC: NEGATIVE MG/DL
LEUKOCYTE ESTERASE UR QL STRIP.AUTO: NEGATIVE
LYMPHOCYTES # BLD AUTO: 0.75 X10(3) UL (ref 1–4)
LYMPHOCYTES NFR BLD AUTO: 60 %
MAGNESIUM SERPL-MCNC: 1.9 MG/DL (ref 1.6–2.6)
MCH RBC QN AUTO: 30.9 PG (ref 26–34)
MCHC RBC AUTO-ENTMCNC: 31.6 G/DL (ref 31–37)
MCV RBC AUTO: 97.9 FL
MICROALBUMIN UR-MCNC: 12.3 MG/DL
MICROALBUMIN/CREAT 24H UR-RTO: 102.5 UG/MG (ref ?–30)
MONOCYTES # BLD AUTO: 0.41 X10(3) UL (ref 0.1–1)
MONOCYTES NFR BLD AUTO: 32.8 %
NEUTROPHILS # BLD AUTO: 0.03 X10 (3) UL (ref 1.5–7.7)
NEUTROPHILS # BLD AUTO: 0.03 X10(3) UL (ref 1.5–7.7)
NEUTROPHILS NFR BLD AUTO: 2.4 %
NITRITE UR QL STRIP.AUTO: NEGATIVE
OSMOLALITY SERPL CALC.SUM OF ELEC: 289 MOSM/KG (ref 275–295)
PH UR STRIP.AUTO: 7 [PH] (ref 5–8)
PHOSPHATE SERPL-MCNC: 4.5 MG/DL (ref 2.5–4.9)
PLATELET # BLD AUTO: 144 10(3)UL (ref 150–450)
POTASSIUM SERPL-SCNC: 4.3 MMOL/L (ref 3.5–5.1)
PROT UR-MCNC: 34.2 MG/DL
RBC # BLD AUTO: 3.27 X10(6)UL
RBC UR QL AUTO: NEGATIVE
SODIUM SERPL-SCNC: 137 MMOL/L (ref 136–145)
SP GR UR STRIP.AUTO: 1.02 (ref 1–1.03)
UROBILINOGEN UR STRIP.AUTO-MCNC: 0.2 MG/DL
WBC # BLD AUTO: 1.3 X10(3) UL (ref 4–11)

## 2022-08-17 PROCEDURE — 87086 URINE CULTURE/COLONY COUNT: CPT

## 2022-08-17 PROCEDURE — 85025 COMPLETE CBC W/AUTO DIFF WBC: CPT

## 2022-08-17 PROCEDURE — 83735 ASSAY OF MAGNESIUM: CPT

## 2022-08-17 PROCEDURE — 81015 MICROSCOPIC EXAM OF URINE: CPT

## 2022-08-17 PROCEDURE — 84100 ASSAY OF PHOSPHORUS: CPT

## 2022-08-17 PROCEDURE — 82570 ASSAY OF URINE CREATININE: CPT

## 2022-08-17 PROCEDURE — 80197 ASSAY OF TACROLIMUS: CPT

## 2022-08-17 PROCEDURE — 81001 URINALYSIS AUTO W/SCOPE: CPT

## 2022-08-17 PROCEDURE — 36415 COLL VENOUS BLD VENIPUNCTURE: CPT

## 2022-08-17 PROCEDURE — 84156 ASSAY OF PROTEIN URINE: CPT

## 2022-08-17 PROCEDURE — 82043 UR ALBUMIN QUANTITATIVE: CPT

## 2022-08-17 PROCEDURE — 80048 BASIC METABOLIC PNL TOTAL CA: CPT

## 2022-08-18 ENCOUNTER — CARDPULM VISIT (OUTPATIENT)
Dept: CARDIAC REHAB | Facility: HOSPITAL | Age: 57
End: 2022-08-18
Attending: INTERNAL MEDICINE
Payer: MEDICARE

## 2022-08-18 PROCEDURE — 93798 PHYS/QHP OP CAR RHAB W/ECG: CPT

## 2022-08-19 LAB — TACROLIMUS: 4.5 NG/ML

## 2022-08-22 ENCOUNTER — APPOINTMENT (OUTPATIENT)
Dept: CARDIAC REHAB | Facility: HOSPITAL | Age: 57
End: 2022-08-22
Attending: INTERNAL MEDICINE
Payer: MEDICARE

## 2022-08-23 ENCOUNTER — APPOINTMENT (OUTPATIENT)
Dept: CARDIAC REHAB | Facility: HOSPITAL | Age: 57
End: 2022-08-23
Payer: MEDICARE

## 2022-08-23 ENCOUNTER — APPOINTMENT (OUTPATIENT)
Dept: CARDIAC REHAB | Facility: HOSPITAL | Age: 57
End: 2022-08-23
Attending: INTERNAL MEDICINE
Payer: MEDICARE

## 2022-08-24 ENCOUNTER — APPOINTMENT (OUTPATIENT)
Dept: CARDIAC REHAB | Facility: HOSPITAL | Age: 57
End: 2022-08-24
Attending: INTERNAL MEDICINE
Payer: MEDICARE

## 2022-08-24 ENCOUNTER — LAB ENCOUNTER (OUTPATIENT)
Dept: LAB | Age: 57
End: 2022-08-24
Attending: INTERNAL MEDICINE
Payer: MEDICARE

## 2022-08-24 DIAGNOSIS — R80.9 PROTEINURIA: ICD-10-CM

## 2022-08-24 DIAGNOSIS — R78.81 BACTEREMIA: ICD-10-CM

## 2022-08-24 DIAGNOSIS — N39.0 URINARY TRACT INFECTION: ICD-10-CM

## 2022-08-24 DIAGNOSIS — E83.42 HYPOMAGNESEMIA: ICD-10-CM

## 2022-08-24 DIAGNOSIS — B25.9 CMV INFECTION (HCC): ICD-10-CM

## 2022-08-24 DIAGNOSIS — Z51.81 ENCOUNTER FOR THERAPEUTIC DRUG MONITORING: ICD-10-CM

## 2022-08-24 DIAGNOSIS — B34.8 BK VIREMIA: ICD-10-CM

## 2022-08-24 DIAGNOSIS — Z94.0 KIDNEY TRANSPLANTED: ICD-10-CM

## 2022-08-24 DIAGNOSIS — D70.9 NEUTROPENIA (HCC): ICD-10-CM

## 2022-08-24 DIAGNOSIS — E78.5 HYPERLIPIDEMIA: ICD-10-CM

## 2022-08-24 DIAGNOSIS — R73.9 HYPERGLYCEMIA: ICD-10-CM

## 2022-08-24 DIAGNOSIS — E55.9 VITAMIN D DEFICIENCY: ICD-10-CM

## 2022-08-24 LAB
ANION GAP SERPL CALC-SCNC: 4 MMOL/L (ref 0–18)
BASOPHILS # BLD AUTO: 0.05 X10(3) UL (ref 0–0.2)
BASOPHILS NFR BLD AUTO: 0.9 %
BILIRUB UR QL STRIP.AUTO: NEGATIVE
BUN BLD-MCNC: 13 MG/DL (ref 7–18)
CALCIUM BLD-MCNC: 8.9 MG/DL (ref 8.5–10.1)
CHLORIDE SERPL-SCNC: 104 MMOL/L (ref 98–112)
CHOLEST SERPL-MCNC: 82 MG/DL (ref ?–200)
CLARITY UR REFRACT.AUTO: CLEAR
CO2 SERPL-SCNC: 27 MMOL/L (ref 21–32)
COLOR UR AUTO: YELLOW
CREAT BLD-MCNC: 1.17 MG/DL
CREAT UR-SCNC: 142 MG/DL
CREAT UR-SCNC: 142 MG/DL
EOSINOPHIL # BLD AUTO: 0.08 X10(3) UL (ref 0–0.7)
EOSINOPHIL NFR BLD AUTO: 1.5 %
ERYTHROCYTE [DISTWIDTH] IN BLOOD BY AUTOMATED COUNT: 12.7 %
EST. AVERAGE GLUCOSE BLD GHB EST-MCNC: 80 MG/DL (ref 68–126)
FASTING PATIENT LIPID ANSWER: YES
FASTING STATUS PATIENT QL REPORTED: YES
GFR SERPLBLD BASED ON 1.73 SQ M-ARVRAT: 74 ML/MIN/1.73M2 (ref 60–?)
GLUCOSE BLD-MCNC: 158 MG/DL (ref 70–99)
GLUCOSE BLD-MCNC: 184 MG/DL (ref 70–99)
GLUCOSE UR STRIP.AUTO-MCNC: NEGATIVE MG/DL
HBA1C MFR BLD: 4.4 % (ref ?–5.7)
HCT VFR BLD AUTO: 38.5 %
HDLC SERPL-MCNC: 38 MG/DL (ref 40–59)
HGB BLD-MCNC: 11.9 G/DL
HYALINE CASTS #/AREA URNS AUTO: PRESENT /LPF
IMM GRANULOCYTES # BLD AUTO: 0.11 X10(3) UL (ref 0–1)
IMM GRANULOCYTES NFR BLD: 2 %
KETONES UR STRIP.AUTO-MCNC: NEGATIVE MG/DL
LDLC SERPL CALC-MCNC: 22 MG/DL (ref ?–100)
LEUKOCYTE ESTERASE UR QL STRIP.AUTO: NEGATIVE
LYMPHOCYTES # BLD AUTO: 1.63 X10(3) UL (ref 1–4)
LYMPHOCYTES NFR BLD AUTO: 30 %
MAGNESIUM SERPL-MCNC: 1.9 MG/DL (ref 1.6–2.6)
MCH RBC QN AUTO: 30.2 PG (ref 26–34)
MCHC RBC AUTO-ENTMCNC: 30.9 G/DL (ref 31–37)
MCV RBC AUTO: 97.7 FL
MICROALBUMIN UR-MCNC: 13.3 MG/DL
MICROALBUMIN/CREAT 24H UR-RTO: 93.7 UG/MG (ref ?–30)
MONOCYTES # BLD AUTO: 0.57 X10(3) UL (ref 0.1–1)
MONOCYTES NFR BLD AUTO: 10.5 %
NEUTROPHILS # BLD AUTO: 3 X10 (3) UL (ref 1.5–7.7)
NEUTROPHILS # BLD AUTO: 3 X10(3) UL (ref 1.5–7.7)
NEUTROPHILS NFR BLD AUTO: 55.1 %
NITRITE UR QL STRIP.AUTO: NEGATIVE
NONHDLC SERPL-MCNC: 44 MG/DL (ref ?–130)
OSMOLALITY SERPL CALC.SUM OF ELEC: 285 MOSM/KG (ref 275–295)
PH UR STRIP.AUTO: 6.5 [PH] (ref 5–8)
PHOSPHATE SERPL-MCNC: 4.5 MG/DL (ref 2.5–4.9)
PLATELET # BLD AUTO: 168 10(3)UL (ref 150–450)
POTASSIUM SERPL-SCNC: 4.5 MMOL/L (ref 3.5–5.1)
PROT UR-MCNC: 39 MG/DL
PTH-INTACT SERPL-MCNC: 30.6 PG/ML (ref 18.5–88)
RBC # BLD AUTO: 3.94 X10(6)UL
RBC UR QL AUTO: NEGATIVE
SODIUM SERPL-SCNC: 135 MMOL/L (ref 136–145)
SP GR UR STRIP.AUTO: 1.02 (ref 1–1.03)
TRIGL SERPL-MCNC: 123 MG/DL (ref 30–149)
UROBILINOGEN UR STRIP.AUTO-MCNC: 0.2 MG/DL
VIT D+METAB SERPL-MCNC: 43.7 NG/ML (ref 30–100)
VLDLC SERPL CALC-MCNC: 16 MG/DL (ref 0–30)
WBC # BLD AUTO: 5.4 X10(3) UL (ref 4–11)

## 2022-08-24 PROCEDURE — 80197 ASSAY OF TACROLIMUS: CPT

## 2022-08-24 PROCEDURE — 83970 ASSAY OF PARATHORMONE: CPT

## 2022-08-24 PROCEDURE — 83036 HEMOGLOBIN GLYCOSYLATED A1C: CPT

## 2022-08-24 PROCEDURE — 3044F HG A1C LEVEL LT 7.0%: CPT | Performed by: INTERNAL MEDICINE

## 2022-08-24 PROCEDURE — 82043 UR ALBUMIN QUANTITATIVE: CPT

## 2022-08-24 PROCEDURE — 84156 ASSAY OF PROTEIN URINE: CPT

## 2022-08-24 PROCEDURE — 82570 ASSAY OF URINE CREATININE: CPT

## 2022-08-24 PROCEDURE — 81015 MICROSCOPIC EXAM OF URINE: CPT

## 2022-08-24 PROCEDURE — 81001 URINALYSIS AUTO W/SCOPE: CPT

## 2022-08-24 PROCEDURE — 82306 VITAMIN D 25 HYDROXY: CPT

## 2022-08-24 PROCEDURE — 83735 ASSAY OF MAGNESIUM: CPT

## 2022-08-24 PROCEDURE — 80048 BASIC METABOLIC PNL TOTAL CA: CPT

## 2022-08-24 PROCEDURE — 80061 LIPID PANEL: CPT

## 2022-08-24 PROCEDURE — 84100 ASSAY OF PHOSPHORUS: CPT

## 2022-08-24 PROCEDURE — 36415 COLL VENOUS BLD VENIPUNCTURE: CPT

## 2022-08-24 PROCEDURE — 85025 COMPLETE CBC W/AUTO DIFF WBC: CPT

## 2022-08-25 ENCOUNTER — APPOINTMENT (OUTPATIENT)
Dept: CARDIAC REHAB | Facility: HOSPITAL | Age: 57
End: 2022-08-25
Attending: INTERNAL MEDICINE
Payer: MEDICARE

## 2022-08-26 ENCOUNTER — APPOINTMENT (OUTPATIENT)
Dept: CARDIAC REHAB | Facility: HOSPITAL | Age: 57
End: 2022-08-26
Attending: INTERNAL MEDICINE
Payer: MEDICARE

## 2022-08-26 LAB — TACROLIMUS: 6.3 NG/ML

## 2022-08-29 ENCOUNTER — CARDPULM VISIT (OUTPATIENT)
Dept: CARDIAC REHAB | Facility: HOSPITAL | Age: 57
End: 2022-08-29
Attending: INTERNAL MEDICINE
Payer: MEDICARE

## 2022-08-29 PROCEDURE — 93798 PHYS/QHP OP CAR RHAB W/ECG: CPT

## 2022-08-30 ENCOUNTER — APPOINTMENT (OUTPATIENT)
Dept: CARDIAC REHAB | Facility: HOSPITAL | Age: 57
End: 2022-08-30
Payer: MEDICARE

## 2022-08-30 ENCOUNTER — APPOINTMENT (OUTPATIENT)
Dept: CARDIAC REHAB | Facility: HOSPITAL | Age: 57
End: 2022-08-30
Attending: INTERNAL MEDICINE
Payer: MEDICARE

## 2022-08-31 ENCOUNTER — CARDPULM VISIT (OUTPATIENT)
Dept: CARDIAC REHAB | Facility: HOSPITAL | Age: 57
End: 2022-08-31
Attending: INTERNAL MEDICINE
Payer: MEDICARE

## 2022-08-31 ENCOUNTER — LAB ENCOUNTER (OUTPATIENT)
Dept: LAB | Age: 57
End: 2022-08-31
Attending: INTERNAL MEDICINE
Payer: MEDICARE

## 2022-08-31 DIAGNOSIS — R78.81 BACTEREMIA: ICD-10-CM

## 2022-08-31 DIAGNOSIS — E78.5 HYPERLIPIDEMIA: ICD-10-CM

## 2022-08-31 DIAGNOSIS — B25.9 CMV INFECTION (HCC): ICD-10-CM

## 2022-08-31 DIAGNOSIS — R73.9 HYPERGLYCEMIA: ICD-10-CM

## 2022-08-31 DIAGNOSIS — Z94.0 KIDNEY TRANSPLANTED: ICD-10-CM

## 2022-08-31 DIAGNOSIS — E55.9 VITAMIN D DEFICIENCY: ICD-10-CM

## 2022-08-31 DIAGNOSIS — Z51.81 ENCOUNTER FOR THERAPEUTIC DRUG MONITORING: ICD-10-CM

## 2022-08-31 DIAGNOSIS — D70.9 NEUTROPENIA (HCC): ICD-10-CM

## 2022-08-31 DIAGNOSIS — R80.9 PROTEINURIA: ICD-10-CM

## 2022-08-31 DIAGNOSIS — B34.8 BK VIREMIA: ICD-10-CM

## 2022-08-31 DIAGNOSIS — E83.42 HYPOMAGNESEMIA: ICD-10-CM

## 2022-08-31 DIAGNOSIS — N39.0 URINARY TRACT INFECTION: ICD-10-CM

## 2022-08-31 LAB
ANION GAP SERPL CALC-SCNC: 6 MMOL/L (ref 0–18)
BASOPHILS # BLD AUTO: 0.04 X10(3) UL (ref 0–0.2)
BASOPHILS NFR BLD AUTO: 0.7 %
BILIRUB UR QL STRIP.AUTO: NEGATIVE
BUN BLD-MCNC: 14 MG/DL (ref 7–18)
CALCIUM BLD-MCNC: 8.6 MG/DL (ref 8.5–10.1)
CHLORIDE SERPL-SCNC: 105 MMOL/L (ref 98–112)
CLARITY UR REFRACT.AUTO: CLEAR
CO2 SERPL-SCNC: 26 MMOL/L (ref 21–32)
COLOR UR AUTO: YELLOW
CREAT BLD-MCNC: 1.11 MG/DL
CREAT UR-SCNC: 16.5 MG/DL
CREAT UR-SCNC: 16.5 MG/DL
EOSINOPHIL # BLD AUTO: 0.04 X10(3) UL (ref 0–0.7)
EOSINOPHIL NFR BLD AUTO: 0.7 %
ERYTHROCYTE [DISTWIDTH] IN BLOOD BY AUTOMATED COUNT: 12.6 %
FASTING STATUS PATIENT QL REPORTED: NO
GFR SERPLBLD BASED ON 1.73 SQ M-ARVRAT: 78 ML/MIN/1.73M2 (ref 60–?)
GLUCOSE BLD-MCNC: 88 MG/DL (ref 70–99)
GLUCOSE UR STRIP.AUTO-MCNC: NEGATIVE MG/DL
HCT VFR BLD AUTO: 35.2 %
HGB BLD-MCNC: 11.4 G/DL
IMM GRANULOCYTES # BLD AUTO: 0.01 X10(3) UL (ref 0–1)
IMM GRANULOCYTES NFR BLD: 0.2 %
KETONES UR STRIP.AUTO-MCNC: NEGATIVE MG/DL
LEUKOCYTE ESTERASE UR QL STRIP.AUTO: NEGATIVE
LYMPHOCYTES # BLD AUTO: 1.38 X10(3) UL (ref 1–4)
LYMPHOCYTES NFR BLD AUTO: 25.2 %
MAGNESIUM SERPL-MCNC: 1.9 MG/DL (ref 1.6–2.6)
MCH RBC QN AUTO: 30.6 PG (ref 26–34)
MCHC RBC AUTO-ENTMCNC: 32.4 G/DL (ref 31–37)
MCV RBC AUTO: 94.6 FL
MICROALBUMIN UR-MCNC: 1.74 MG/DL
MICROALBUMIN/CREAT 24H UR-RTO: 105.5 UG/MG (ref ?–30)
MONOCYTES # BLD AUTO: 0.28 X10(3) UL (ref 0.1–1)
MONOCYTES NFR BLD AUTO: 5.1 %
NEUTROPHILS # BLD AUTO: 3.72 X10 (3) UL (ref 1.5–7.7)
NEUTROPHILS # BLD AUTO: 3.72 X10(3) UL (ref 1.5–7.7)
NEUTROPHILS NFR BLD AUTO: 68.1 %
NITRITE UR QL STRIP.AUTO: NEGATIVE
OSMOLALITY SERPL CALC.SUM OF ELEC: 284 MOSM/KG (ref 275–295)
PH UR STRIP.AUTO: 6.5 [PH] (ref 5–8)
PHOSPHATE SERPL-MCNC: 4.5 MG/DL (ref 2.5–4.9)
PLATELET # BLD AUTO: 133 10(3)UL (ref 150–450)
POTASSIUM SERPL-SCNC: 4.3 MMOL/L (ref 3.5–5.1)
PROT UR STRIP.AUTO-MCNC: NEGATIVE MG/DL
PROT UR-MCNC: <5 MG/DL
RBC # BLD AUTO: 3.72 X10(6)UL
RBC UR QL AUTO: NEGATIVE
SODIUM SERPL-SCNC: 137 MMOL/L (ref 136–145)
SP GR UR STRIP.AUTO: 1.01 (ref 1–1.03)
UROBILINOGEN UR STRIP.AUTO-MCNC: 0.2 MG/DL
WBC # BLD AUTO: 5.5 X10(3) UL (ref 4–11)

## 2022-08-31 PROCEDURE — 81003 URINALYSIS AUTO W/O SCOPE: CPT

## 2022-08-31 PROCEDURE — 93798 PHYS/QHP OP CAR RHAB W/ECG: CPT

## 2022-08-31 PROCEDURE — 84156 ASSAY OF PROTEIN URINE: CPT

## 2022-08-31 PROCEDURE — 85025 COMPLETE CBC W/AUTO DIFF WBC: CPT

## 2022-08-31 PROCEDURE — 36415 COLL VENOUS BLD VENIPUNCTURE: CPT

## 2022-08-31 PROCEDURE — 82570 ASSAY OF URINE CREATININE: CPT

## 2022-08-31 PROCEDURE — 80197 ASSAY OF TACROLIMUS: CPT

## 2022-08-31 PROCEDURE — 84100 ASSAY OF PHOSPHORUS: CPT

## 2022-08-31 PROCEDURE — 80048 BASIC METABOLIC PNL TOTAL CA: CPT

## 2022-08-31 PROCEDURE — 83735 ASSAY OF MAGNESIUM: CPT

## 2022-08-31 PROCEDURE — 87086 URINE CULTURE/COLONY COUNT: CPT

## 2022-08-31 PROCEDURE — 82043 UR ALBUMIN QUANTITATIVE: CPT

## 2022-09-01 ENCOUNTER — APPOINTMENT (OUTPATIENT)
Dept: CARDIAC REHAB | Facility: HOSPITAL | Age: 57
End: 2022-09-01
Attending: INTERNAL MEDICINE
Payer: MEDICARE

## 2022-09-02 ENCOUNTER — CARDPULM VISIT (OUTPATIENT)
Dept: CARDIAC REHAB | Facility: HOSPITAL | Age: 57
End: 2022-09-02
Attending: INTERNAL MEDICINE
Payer: MEDICARE

## 2022-09-02 LAB — TACROLIMUS: 5.2 NG/ML

## 2022-09-02 PROCEDURE — 93798 PHYS/QHP OP CAR RHAB W/ECG: CPT

## 2022-09-06 ENCOUNTER — APPOINTMENT (OUTPATIENT)
Dept: CARDIAC REHAB | Facility: HOSPITAL | Age: 57
End: 2022-09-06
Attending: INTERNAL MEDICINE
Payer: MEDICARE

## 2022-09-07 ENCOUNTER — CARDPULM VISIT (OUTPATIENT)
Dept: CARDIAC REHAB | Facility: HOSPITAL | Age: 57
End: 2022-09-07
Attending: INTERNAL MEDICINE
Payer: MEDICARE

## 2022-09-07 ENCOUNTER — LAB ENCOUNTER (OUTPATIENT)
Dept: LAB | Age: 57
End: 2022-09-07
Attending: INTERNAL MEDICINE
Payer: MEDICARE

## 2022-09-07 DIAGNOSIS — R78.81 BACTEREMIA: ICD-10-CM

## 2022-09-07 DIAGNOSIS — R80.9 PROTEINURIA: ICD-10-CM

## 2022-09-07 DIAGNOSIS — R73.9 HYPERGLYCEMIA: ICD-10-CM

## 2022-09-07 DIAGNOSIS — B34.8 BK VIREMIA: ICD-10-CM

## 2022-09-07 DIAGNOSIS — Z94.0 KIDNEY TRANSPLANTED: ICD-10-CM

## 2022-09-07 DIAGNOSIS — E83.42 HYPOMAGNESEMIA: ICD-10-CM

## 2022-09-07 DIAGNOSIS — N39.0 URINARY TRACT INFECTION: ICD-10-CM

## 2022-09-07 DIAGNOSIS — E55.9 VITAMIN D DEFICIENCY: ICD-10-CM

## 2022-09-07 DIAGNOSIS — D70.9 NEUTROPENIA (HCC): ICD-10-CM

## 2022-09-07 DIAGNOSIS — Z51.81 ENCOUNTER FOR THERAPEUTIC DRUG MONITORING: ICD-10-CM

## 2022-09-07 DIAGNOSIS — B25.9 CMV INFECTION (HCC): ICD-10-CM

## 2022-09-07 DIAGNOSIS — E78.5 HYPERLIPIDEMIA: ICD-10-CM

## 2022-09-07 LAB
ANION GAP SERPL CALC-SCNC: 6 MMOL/L (ref 0–18)
BASOPHILS # BLD AUTO: 0.04 X10(3) UL (ref 0–0.2)
BASOPHILS NFR BLD AUTO: 0.9 %
BILIRUB UR QL STRIP.AUTO: NEGATIVE
BUN BLD-MCNC: 14 MG/DL (ref 7–18)
CALCIUM BLD-MCNC: 8.7 MG/DL (ref 8.5–10.1)
CHLORIDE SERPL-SCNC: 105 MMOL/L (ref 98–112)
CLARITY UR REFRACT.AUTO: CLEAR
CO2 SERPL-SCNC: 27 MMOL/L (ref 21–32)
COLOR UR AUTO: YELLOW
CREAT BLD-MCNC: 1.18 MG/DL
CREAT UR-SCNC: 15.5 MG/DL
CREAT UR-SCNC: 15.5 MG/DL
EOSINOPHIL # BLD AUTO: 0.04 X10(3) UL (ref 0–0.7)
EOSINOPHIL NFR BLD AUTO: 0.9 %
ERYTHROCYTE [DISTWIDTH] IN BLOOD BY AUTOMATED COUNT: 12.3 %
FASTING STATUS PATIENT QL REPORTED: NO
GFR SERPLBLD BASED ON 1.73 SQ M-ARVRAT: 73 ML/MIN/1.73M2 (ref 60–?)
GLUCOSE BLD-MCNC: 120 MG/DL (ref 70–99)
GLUCOSE UR STRIP.AUTO-MCNC: NEGATIVE MG/DL
HCT VFR BLD AUTO: 38.1 %
HGB BLD-MCNC: 12.1 G/DL
IMM GRANULOCYTES # BLD AUTO: 0.01 X10(3) UL (ref 0–1)
IMM GRANULOCYTES NFR BLD: 0.2 %
KETONES UR STRIP.AUTO-MCNC: NEGATIVE MG/DL
LEUKOCYTE ESTERASE UR QL STRIP.AUTO: NEGATIVE
LYMPHOCYTES # BLD AUTO: 1.4 X10(3) UL (ref 1–4)
LYMPHOCYTES NFR BLD AUTO: 32.1 %
MAGNESIUM SERPL-MCNC: 2.2 MG/DL (ref 1.6–2.6)
MCH RBC QN AUTO: 29.7 PG (ref 26–34)
MCHC RBC AUTO-ENTMCNC: 31.8 G/DL (ref 31–37)
MCV RBC AUTO: 93.4 FL
MICROALBUMIN UR-MCNC: 2.29 MG/DL
MICROALBUMIN/CREAT 24H UR-RTO: 147.7 UG/MG (ref ?–30)
MONOCYTES # BLD AUTO: 0.37 X10(3) UL (ref 0.1–1)
MONOCYTES NFR BLD AUTO: 8.5 %
NEUTROPHILS # BLD AUTO: 2.5 X10 (3) UL (ref 1.5–7.7)
NEUTROPHILS # BLD AUTO: 2.5 X10(3) UL (ref 1.5–7.7)
NEUTROPHILS NFR BLD AUTO: 57.4 %
NITRITE UR QL STRIP.AUTO: NEGATIVE
OSMOLALITY SERPL CALC.SUM OF ELEC: 288 MOSM/KG (ref 275–295)
PH UR STRIP.AUTO: 7 [PH] (ref 5–8)
PHOSPHATE SERPL-MCNC: 4.4 MG/DL (ref 2.5–4.9)
PLATELET # BLD AUTO: 124 10(3)UL (ref 150–450)
POTASSIUM SERPL-SCNC: 4.7 MMOL/L (ref 3.5–5.1)
PROT UR STRIP.AUTO-MCNC: NEGATIVE MG/DL
PROT UR-MCNC: <5 MG/DL
RBC # BLD AUTO: 4.08 X10(6)UL
RBC UR QL AUTO: NEGATIVE
SODIUM SERPL-SCNC: 138 MMOL/L (ref 136–145)
SP GR UR STRIP.AUTO: 1.01 (ref 1–1.03)
UROBILINOGEN UR STRIP.AUTO-MCNC: 0.2 MG/DL
WBC # BLD AUTO: 4.4 X10(3) UL (ref 4–11)

## 2022-09-07 PROCEDURE — 36415 COLL VENOUS BLD VENIPUNCTURE: CPT

## 2022-09-07 PROCEDURE — 82043 UR ALBUMIN QUANTITATIVE: CPT

## 2022-09-07 PROCEDURE — 81003 URINALYSIS AUTO W/O SCOPE: CPT

## 2022-09-07 PROCEDURE — 84100 ASSAY OF PHOSPHORUS: CPT

## 2022-09-07 PROCEDURE — 80048 BASIC METABOLIC PNL TOTAL CA: CPT

## 2022-09-07 PROCEDURE — 87086 URINE CULTURE/COLONY COUNT: CPT

## 2022-09-07 PROCEDURE — 84156 ASSAY OF PROTEIN URINE: CPT

## 2022-09-07 PROCEDURE — 82570 ASSAY OF URINE CREATININE: CPT

## 2022-09-07 PROCEDURE — 80197 ASSAY OF TACROLIMUS: CPT

## 2022-09-07 PROCEDURE — 85025 COMPLETE CBC W/AUTO DIFF WBC: CPT

## 2022-09-07 PROCEDURE — 83735 ASSAY OF MAGNESIUM: CPT

## 2022-09-07 PROCEDURE — 93798 PHYS/QHP OP CAR RHAB W/ECG: CPT

## 2022-09-08 ENCOUNTER — APPOINTMENT (OUTPATIENT)
Dept: CARDIAC REHAB | Facility: HOSPITAL | Age: 57
End: 2022-09-08
Attending: INTERNAL MEDICINE
Payer: MEDICARE

## 2022-09-09 ENCOUNTER — CARDPULM VISIT (OUTPATIENT)
Dept: CARDIAC REHAB | Facility: HOSPITAL | Age: 57
End: 2022-09-09
Attending: INTERNAL MEDICINE
Payer: MEDICARE

## 2022-09-09 LAB — TACROLIMUS: 3.6 NG/ML

## 2022-09-09 PROCEDURE — 93798 PHYS/QHP OP CAR RHAB W/ECG: CPT

## 2022-09-12 ENCOUNTER — APPOINTMENT (OUTPATIENT)
Dept: CARDIAC REHAB | Facility: HOSPITAL | Age: 57
End: 2022-09-12
Attending: INTERNAL MEDICINE
Payer: MEDICARE

## 2022-09-12 PROCEDURE — 93798 PHYS/QHP OP CAR RHAB W/ECG: CPT

## 2022-09-13 ENCOUNTER — APPOINTMENT (OUTPATIENT)
Dept: CARDIAC REHAB | Facility: HOSPITAL | Age: 57
End: 2022-09-13
Attending: INTERNAL MEDICINE
Payer: MEDICARE

## 2022-09-14 ENCOUNTER — APPOINTMENT (OUTPATIENT)
Dept: CARDIAC REHAB | Facility: HOSPITAL | Age: 57
End: 2022-09-14
Attending: INTERNAL MEDICINE
Payer: MEDICARE

## 2022-09-14 ENCOUNTER — LAB ENCOUNTER (OUTPATIENT)
Dept: LAB | Age: 57
End: 2022-09-14
Attending: INTERNAL MEDICINE
Payer: MEDICARE

## 2022-09-14 DIAGNOSIS — N39.0 URINARY TRACT INFECTION: ICD-10-CM

## 2022-09-14 DIAGNOSIS — E83.42 HYPOMAGNESEMIA: ICD-10-CM

## 2022-09-14 DIAGNOSIS — R73.9 HYPERGLYCEMIA: ICD-10-CM

## 2022-09-14 DIAGNOSIS — B34.8 BK VIREMIA: ICD-10-CM

## 2022-09-14 DIAGNOSIS — D70.9 NEUTROPENIA (HCC): ICD-10-CM

## 2022-09-14 DIAGNOSIS — E55.9 VITAMIN D DEFICIENCY: ICD-10-CM

## 2022-09-14 DIAGNOSIS — R80.9 PROTEINURIA: ICD-10-CM

## 2022-09-14 DIAGNOSIS — Z51.81 ENCOUNTER FOR THERAPEUTIC DRUG MONITORING: ICD-10-CM

## 2022-09-14 DIAGNOSIS — Z94.0 KIDNEY TRANSPLANTED: ICD-10-CM

## 2022-09-14 DIAGNOSIS — E78.5 HYPERLIPIDEMIA: ICD-10-CM

## 2022-09-14 DIAGNOSIS — B25.9 CMV INFECTION (HCC): ICD-10-CM

## 2022-09-14 DIAGNOSIS — R78.81 BACTEREMIA: ICD-10-CM

## 2022-09-14 LAB
ANION GAP SERPL CALC-SCNC: 5 MMOL/L (ref 0–18)
BASOPHILS # BLD AUTO: 0.03 X10(3) UL (ref 0–0.2)
BASOPHILS NFR BLD AUTO: 0.6 %
BILIRUB UR QL STRIP.AUTO: NEGATIVE
BUN BLD-MCNC: 16 MG/DL (ref 7–18)
CALCIUM BLD-MCNC: 8.7 MG/DL (ref 8.5–10.1)
CHLORIDE SERPL-SCNC: 104 MMOL/L (ref 98–112)
CLARITY UR REFRACT.AUTO: CLEAR
CO2 SERPL-SCNC: 26 MMOL/L (ref 21–32)
COLOR UR AUTO: YELLOW
CREAT BLD-MCNC: 1.1 MG/DL
CREAT UR-SCNC: 140 MG/DL
CREAT UR-SCNC: 140 MG/DL
EOSINOPHIL # BLD AUTO: 0.08 X10(3) UL (ref 0–0.7)
EOSINOPHIL NFR BLD AUTO: 1.6 %
ERYTHROCYTE [DISTWIDTH] IN BLOOD BY AUTOMATED COUNT: 12.3 %
FASTING STATUS PATIENT QL REPORTED: NO
GFR SERPLBLD BASED ON 1.73 SQ M-ARVRAT: 79 ML/MIN/1.73M2 (ref 60–?)
GLUCOSE BLD-MCNC: 212 MG/DL (ref 70–99)
GLUCOSE UR STRIP.AUTO-MCNC: NEGATIVE MG/DL
HCT VFR BLD AUTO: 35.8 %
HGB BLD-MCNC: 11.4 G/DL
IMM GRANULOCYTES # BLD AUTO: 0.01 X10(3) UL (ref 0–1)
IMM GRANULOCYTES NFR BLD: 0.2 %
KETONES UR STRIP.AUTO-MCNC: NEGATIVE MG/DL
LEUKOCYTE ESTERASE UR QL STRIP.AUTO: NEGATIVE
LYMPHOCYTES # BLD AUTO: 0.82 X10(3) UL (ref 1–4)
LYMPHOCYTES NFR BLD AUTO: 16.7 %
MAGNESIUM SERPL-MCNC: 1.8 MG/DL (ref 1.6–2.6)
MCH RBC QN AUTO: 29.8 PG (ref 26–34)
MCHC RBC AUTO-ENTMCNC: 31.8 G/DL (ref 31–37)
MCV RBC AUTO: 93.5 FL
MICROALBUMIN UR-MCNC: 13.5 MG/DL
MICROALBUMIN/CREAT 24H UR-RTO: 96.4 UG/MG (ref ?–30)
MONOCYTES # BLD AUTO: 0.51 X10(3) UL (ref 0.1–1)
MONOCYTES NFR BLD AUTO: 10.4 %
NEUTROPHILS # BLD AUTO: 3.47 X10 (3) UL (ref 1.5–7.7)
NEUTROPHILS # BLD AUTO: 3.47 X10(3) UL (ref 1.5–7.7)
NEUTROPHILS NFR BLD AUTO: 70.5 %
NITRITE UR QL STRIP.AUTO: NEGATIVE
OSMOLALITY SERPL CALC.SUM OF ELEC: 287 MOSM/KG (ref 275–295)
PH UR STRIP.AUTO: 6.5 [PH] (ref 5–8)
PHOSPHATE SERPL-MCNC: 4.1 MG/DL (ref 2.5–4.9)
PLATELET # BLD AUTO: 120 10(3)UL (ref 150–450)
POTASSIUM SERPL-SCNC: 4.4 MMOL/L (ref 3.5–5.1)
PROT UR-MCNC: 38.7 MG/DL
RBC # BLD AUTO: 3.83 X10(6)UL
RBC UR QL AUTO: NEGATIVE
SODIUM SERPL-SCNC: 135 MMOL/L (ref 136–145)
SP GR UR STRIP.AUTO: 1.02 (ref 1–1.03)
UROBILINOGEN UR STRIP.AUTO-MCNC: 0.2 MG/DL
WBC # BLD AUTO: 4.9 X10(3) UL (ref 4–11)

## 2022-09-14 PROCEDURE — 87086 URINE CULTURE/COLONY COUNT: CPT

## 2022-09-14 PROCEDURE — 85025 COMPLETE CBC W/AUTO DIFF WBC: CPT

## 2022-09-14 PROCEDURE — 82570 ASSAY OF URINE CREATININE: CPT

## 2022-09-14 PROCEDURE — 80048 BASIC METABOLIC PNL TOTAL CA: CPT

## 2022-09-14 PROCEDURE — 82043 UR ALBUMIN QUANTITATIVE: CPT

## 2022-09-14 PROCEDURE — 84100 ASSAY OF PHOSPHORUS: CPT

## 2022-09-14 PROCEDURE — 81001 URINALYSIS AUTO W/SCOPE: CPT

## 2022-09-14 PROCEDURE — 81015 MICROSCOPIC EXAM OF URINE: CPT

## 2022-09-14 PROCEDURE — 36415 COLL VENOUS BLD VENIPUNCTURE: CPT

## 2022-09-14 PROCEDURE — 84156 ASSAY OF PROTEIN URINE: CPT

## 2022-09-14 PROCEDURE — 80197 ASSAY OF TACROLIMUS: CPT

## 2022-09-14 PROCEDURE — 83735 ASSAY OF MAGNESIUM: CPT

## 2022-09-15 ENCOUNTER — APPOINTMENT (OUTPATIENT)
Dept: CARDIAC REHAB | Facility: HOSPITAL | Age: 57
End: 2022-09-15
Attending: INTERNAL MEDICINE
Payer: MEDICARE

## 2022-09-16 ENCOUNTER — APPOINTMENT (OUTPATIENT)
Dept: CARDIAC REHAB | Facility: HOSPITAL | Age: 57
End: 2022-09-16
Attending: INTERNAL MEDICINE
Payer: MEDICARE

## 2022-09-17 LAB — TACROLIMUS: 5.4 NG/ML

## 2022-09-19 ENCOUNTER — APPOINTMENT (OUTPATIENT)
Dept: CARDIAC REHAB | Facility: HOSPITAL | Age: 57
End: 2022-09-19
Attending: INTERNAL MEDICINE
Payer: MEDICARE

## 2022-09-20 ENCOUNTER — APPOINTMENT (OUTPATIENT)
Dept: CARDIAC REHAB | Facility: HOSPITAL | Age: 57
End: 2022-09-20
Attending: INTERNAL MEDICINE
Payer: MEDICARE

## 2022-09-21 ENCOUNTER — CARDPULM VISIT (OUTPATIENT)
Dept: CARDIAC REHAB | Facility: HOSPITAL | Age: 57
End: 2022-09-21
Attending: INTERNAL MEDICINE
Payer: MEDICARE

## 2022-09-21 PROCEDURE — 93798 PHYS/QHP OP CAR RHAB W/ECG: CPT

## 2022-09-22 ENCOUNTER — APPOINTMENT (OUTPATIENT)
Dept: CARDIAC REHAB | Facility: HOSPITAL | Age: 57
End: 2022-09-22
Attending: INTERNAL MEDICINE
Payer: MEDICARE

## 2022-09-23 ENCOUNTER — CARDPULM VISIT (OUTPATIENT)
Dept: CARDIAC REHAB | Facility: HOSPITAL | Age: 57
End: 2022-09-23
Attending: INTERNAL MEDICINE
Payer: MEDICARE

## 2022-09-23 PROCEDURE — 93798 PHYS/QHP OP CAR RHAB W/ECG: CPT

## 2022-09-26 ENCOUNTER — CARDPULM VISIT (OUTPATIENT)
Dept: CARDIAC REHAB | Facility: HOSPITAL | Age: 57
End: 2022-09-26
Attending: INTERNAL MEDICINE
Payer: MEDICARE

## 2022-09-26 PROCEDURE — 93798 PHYS/QHP OP CAR RHAB W/ECG: CPT

## 2022-09-27 ENCOUNTER — APPOINTMENT (OUTPATIENT)
Dept: CARDIAC REHAB | Facility: HOSPITAL | Age: 57
End: 2022-09-27
Attending: INTERNAL MEDICINE
Payer: MEDICARE

## 2022-09-28 ENCOUNTER — CARDPULM VISIT (OUTPATIENT)
Dept: CARDIAC REHAB | Facility: HOSPITAL | Age: 57
End: 2022-09-28
Attending: INTERNAL MEDICINE
Payer: MEDICARE

## 2022-09-28 PROCEDURE — 93798 PHYS/QHP OP CAR RHAB W/ECG: CPT

## 2022-09-29 ENCOUNTER — APPOINTMENT (OUTPATIENT)
Dept: CARDIAC REHAB | Facility: HOSPITAL | Age: 57
End: 2022-09-29
Attending: INTERNAL MEDICINE
Payer: MEDICARE

## 2022-09-30 ENCOUNTER — CARDPULM VISIT (OUTPATIENT)
Dept: CARDIAC REHAB | Facility: HOSPITAL | Age: 57
End: 2022-09-30
Attending: INTERNAL MEDICINE
Payer: MEDICARE

## 2022-09-30 PROCEDURE — 93798 PHYS/QHP OP CAR RHAB W/ECG: CPT

## 2022-10-03 ENCOUNTER — LAB ENCOUNTER (OUTPATIENT)
Dept: LAB | Age: 57
End: 2022-10-03
Attending: INTERNAL MEDICINE
Payer: MEDICARE

## 2022-10-03 ENCOUNTER — CARDPULM VISIT (OUTPATIENT)
Dept: CARDIAC REHAB | Facility: HOSPITAL | Age: 57
End: 2022-10-03
Attending: INTERNAL MEDICINE
Payer: MEDICARE

## 2022-10-03 DIAGNOSIS — Z51.81 ENCOUNTER FOR THERAPEUTIC DRUG MONITORING: ICD-10-CM

## 2022-10-03 DIAGNOSIS — E83.42 HYPOMAGNESEMIA: ICD-10-CM

## 2022-10-03 DIAGNOSIS — R78.81 BACTEREMIA: ICD-10-CM

## 2022-10-03 DIAGNOSIS — B34.8 BK VIREMIA: ICD-10-CM

## 2022-10-03 DIAGNOSIS — N39.0 URINARY TRACT INFECTION: ICD-10-CM

## 2022-10-03 DIAGNOSIS — D70.9 NEUTROPENIA (HCC): ICD-10-CM

## 2022-10-03 DIAGNOSIS — Z94.0 KIDNEY TRANSPLANTED: ICD-10-CM

## 2022-10-03 DIAGNOSIS — E55.9 VITAMIN D DEFICIENCY: ICD-10-CM

## 2022-10-03 DIAGNOSIS — R80.9 PROTEINURIA: ICD-10-CM

## 2022-10-03 DIAGNOSIS — B25.9 CMV INFECTION (HCC): ICD-10-CM

## 2022-10-03 DIAGNOSIS — R73.9 HYPERGLYCEMIA: ICD-10-CM

## 2022-10-03 DIAGNOSIS — E78.5 HYPERLIPIDEMIA: ICD-10-CM

## 2022-10-03 LAB
ANION GAP SERPL CALC-SCNC: 3 MMOL/L (ref 0–18)
BASOPHILS # BLD AUTO: 0.03 X10(3) UL (ref 0–0.2)
BASOPHILS NFR BLD AUTO: 0.5 %
BILIRUB UR QL STRIP.AUTO: NEGATIVE
BUN BLD-MCNC: 17 MG/DL (ref 7–18)
CALCIUM BLD-MCNC: 8.4 MG/DL (ref 8.5–10.1)
CHLORIDE SERPL-SCNC: 106 MMOL/L (ref 98–112)
CLARITY UR REFRACT.AUTO: CLEAR
CO2 SERPL-SCNC: 26 MMOL/L (ref 21–32)
CREAT BLD-MCNC: 1.3 MG/DL
CREAT UR-SCNC: 37.1 MG/DL
CREAT UR-SCNC: 37.1 MG/DL
EOSINOPHIL # BLD AUTO: 0.04 X10(3) UL (ref 0–0.7)
EOSINOPHIL NFR BLD AUTO: 0.7 %
ERYTHROCYTE [DISTWIDTH] IN BLOOD BY AUTOMATED COUNT: 12.5 %
FASTING STATUS PATIENT QL REPORTED: NO
GFR SERPLBLD BASED ON 1.73 SQ M-ARVRAT: 65 ML/MIN/1.73M2 (ref 60–?)
GLUCOSE BLD-MCNC: 157 MG/DL (ref 70–99)
GLUCOSE UR STRIP.AUTO-MCNC: NEGATIVE MG/DL
HCT VFR BLD AUTO: 37.6 %
HGB BLD-MCNC: 11.9 G/DL
IMM GRANULOCYTES # BLD AUTO: 0.02 X10(3) UL (ref 0–1)
IMM GRANULOCYTES NFR BLD: 0.3 %
KETONES UR STRIP.AUTO-MCNC: NEGATIVE MG/DL
LEUKOCYTE ESTERASE UR QL STRIP.AUTO: NEGATIVE
LYMPHOCYTES # BLD AUTO: 1.39 X10(3) UL (ref 1–4)
LYMPHOCYTES NFR BLD AUTO: 23.3 %
MAGNESIUM SERPL-MCNC: 2 MG/DL (ref 1.6–2.6)
MCH RBC QN AUTO: 29 PG (ref 26–34)
MCHC RBC AUTO-ENTMCNC: 31.6 G/DL (ref 31–37)
MCV RBC AUTO: 91.5 FL
MICROALBUMIN UR-MCNC: 3.91 MG/DL
MICROALBUMIN/CREAT 24H UR-RTO: 105.4 UG/MG (ref ?–30)
MONOCYTES # BLD AUTO: 0.45 X10(3) UL (ref 0.1–1)
MONOCYTES NFR BLD AUTO: 7.6 %
NEUTROPHILS # BLD AUTO: 4.03 X10 (3) UL (ref 1.5–7.7)
NEUTROPHILS # BLD AUTO: 4.03 X10(3) UL (ref 1.5–7.7)
NEUTROPHILS NFR BLD AUTO: 67.6 %
NITRITE UR QL STRIP.AUTO: NEGATIVE
OSMOLALITY SERPL CALC.SUM OF ELEC: 285 MOSM/KG (ref 275–295)
PH UR STRIP.AUTO: 7 [PH] (ref 5–8)
PHOSPHATE SERPL-MCNC: 4.5 MG/DL (ref 2.5–4.9)
PLATELET # BLD AUTO: 162 10(3)UL (ref 150–450)
POTASSIUM SERPL-SCNC: 4.9 MMOL/L (ref 3.5–5.1)
PROT UR STRIP.AUTO-MCNC: NEGATIVE MG/DL
PROT UR-MCNC: 9.6 MG/DL
RBC # BLD AUTO: 4.11 X10(6)UL
RBC UR QL AUTO: NEGATIVE
SODIUM SERPL-SCNC: 135 MMOL/L (ref 136–145)
SP GR UR STRIP.AUTO: 1.01 (ref 1–1.03)
UROBILINOGEN UR STRIP.AUTO-MCNC: <2 MG/DL
WBC # BLD AUTO: 6 X10(3) UL (ref 4–11)

## 2022-10-03 PROCEDURE — 93798 PHYS/QHP OP CAR RHAB W/ECG: CPT

## 2022-10-03 PROCEDURE — 84156 ASSAY OF PROTEIN URINE: CPT

## 2022-10-03 PROCEDURE — 87086 URINE CULTURE/COLONY COUNT: CPT

## 2022-10-03 PROCEDURE — 84100 ASSAY OF PHOSPHORUS: CPT

## 2022-10-03 PROCEDURE — 82043 UR ALBUMIN QUANTITATIVE: CPT

## 2022-10-03 PROCEDURE — 82570 ASSAY OF URINE CREATININE: CPT

## 2022-10-03 PROCEDURE — 83735 ASSAY OF MAGNESIUM: CPT

## 2022-10-03 PROCEDURE — 36415 COLL VENOUS BLD VENIPUNCTURE: CPT

## 2022-10-03 PROCEDURE — 85025 COMPLETE CBC W/AUTO DIFF WBC: CPT

## 2022-10-03 PROCEDURE — 81003 URINALYSIS AUTO W/O SCOPE: CPT

## 2022-10-03 PROCEDURE — 80048 BASIC METABOLIC PNL TOTAL CA: CPT

## 2022-10-03 PROCEDURE — 80197 ASSAY OF TACROLIMUS: CPT

## 2022-10-04 ENCOUNTER — APPOINTMENT (OUTPATIENT)
Dept: CARDIAC REHAB | Facility: HOSPITAL | Age: 57
End: 2022-10-04
Attending: INTERNAL MEDICINE
Payer: MEDICARE

## 2022-10-05 ENCOUNTER — CARDPULM VISIT (OUTPATIENT)
Dept: CARDIAC REHAB | Facility: HOSPITAL | Age: 57
End: 2022-10-05
Attending: INTERNAL MEDICINE
Payer: MEDICARE

## 2022-10-05 LAB — TACROLIMUS: 6.4 NG/ML

## 2022-10-05 PROCEDURE — 93798 PHYS/QHP OP CAR RHAB W/ECG: CPT

## 2022-10-06 ENCOUNTER — APPOINTMENT (OUTPATIENT)
Dept: CARDIAC REHAB | Facility: HOSPITAL | Age: 57
End: 2022-10-06
Attending: INTERNAL MEDICINE
Payer: MEDICARE

## 2022-10-10 ENCOUNTER — CARDPULM VISIT (OUTPATIENT)
Dept: CARDIAC REHAB | Facility: HOSPITAL | Age: 57
End: 2022-10-10
Attending: INTERNAL MEDICINE
Payer: MEDICARE

## 2022-10-10 PROCEDURE — 93798 PHYS/QHP OP CAR RHAB W/ECG: CPT

## 2022-10-11 ENCOUNTER — APPOINTMENT (OUTPATIENT)
Dept: CARDIAC REHAB | Facility: HOSPITAL | Age: 57
End: 2022-10-11
Attending: INTERNAL MEDICINE
Payer: MEDICARE

## 2022-10-12 ENCOUNTER — CARDPULM VISIT (OUTPATIENT)
Dept: CARDIAC REHAB | Facility: HOSPITAL | Age: 57
End: 2022-10-12
Attending: INTERNAL MEDICINE
Payer: MEDICARE

## 2022-10-12 PROCEDURE — 93798 PHYS/QHP OP CAR RHAB W/ECG: CPT

## 2022-10-13 PROBLEM — E83.42 HYPOMAGNESEMIA: Status: ACTIVE | Noted: 2022-09-19

## 2022-10-14 ENCOUNTER — APPOINTMENT (OUTPATIENT)
Dept: CARDIAC REHAB | Facility: HOSPITAL | Age: 57
End: 2022-10-14
Payer: MEDICARE

## 2022-10-17 ENCOUNTER — APPOINTMENT (OUTPATIENT)
Dept: CARDIAC REHAB | Facility: HOSPITAL | Age: 57
End: 2022-10-17
Payer: MEDICARE

## 2022-10-19 ENCOUNTER — APPOINTMENT (OUTPATIENT)
Dept: CARDIAC REHAB | Facility: HOSPITAL | Age: 57
End: 2022-10-19
Attending: INTERNAL MEDICINE
Payer: MEDICARE

## 2022-10-21 ENCOUNTER — CARDPULM VISIT (OUTPATIENT)
Dept: CARDIAC REHAB | Facility: HOSPITAL | Age: 57
End: 2022-10-21
Attending: INTERNAL MEDICINE
Payer: MEDICARE

## 2022-10-21 PROCEDURE — 93798 PHYS/QHP OP CAR RHAB W/ECG: CPT

## 2022-10-24 ENCOUNTER — CARDPULM VISIT (OUTPATIENT)
Dept: CARDIAC REHAB | Facility: HOSPITAL | Age: 57
End: 2022-10-24
Attending: INTERNAL MEDICINE
Payer: MEDICARE

## 2022-10-24 PROCEDURE — 93798 PHYS/QHP OP CAR RHAB W/ECG: CPT

## 2022-10-26 ENCOUNTER — APPOINTMENT (OUTPATIENT)
Dept: CARDIAC REHAB | Facility: HOSPITAL | Age: 57
End: 2022-10-26
Attending: INTERNAL MEDICINE
Payer: MEDICARE

## 2022-10-28 ENCOUNTER — APPOINTMENT (OUTPATIENT)
Dept: CARDIAC REHAB | Facility: HOSPITAL | Age: 57
End: 2022-10-28
Attending: INTERNAL MEDICINE
Payer: MEDICARE

## 2022-10-31 ENCOUNTER — APPOINTMENT (OUTPATIENT)
Dept: CARDIAC REHAB | Facility: HOSPITAL | Age: 57
End: 2022-10-31
Attending: INTERNAL MEDICINE
Payer: MEDICARE

## 2022-11-08 ENCOUNTER — LAB ENCOUNTER (OUTPATIENT)
Dept: LAB | Age: 57
End: 2022-11-08
Attending: INTERNAL MEDICINE
Payer: MEDICARE

## 2022-11-08 DIAGNOSIS — R78.81 BACTEREMIA: ICD-10-CM

## 2022-11-08 DIAGNOSIS — R80.9 PROTEINURIA: ICD-10-CM

## 2022-11-08 DIAGNOSIS — Z51.81 ENCOUNTER FOR THERAPEUTIC DRUG MONITORING: ICD-10-CM

## 2022-11-08 DIAGNOSIS — N39.0 URINARY TRACT INFECTION: ICD-10-CM

## 2022-11-08 DIAGNOSIS — B34.8 BK VIREMIA: ICD-10-CM

## 2022-11-08 DIAGNOSIS — E78.5 HYPERLIPIDEMIA: ICD-10-CM

## 2022-11-08 DIAGNOSIS — R73.9 HYPERGLYCEMIA: ICD-10-CM

## 2022-11-08 DIAGNOSIS — B25.9 CMV INFECTION (HCC): ICD-10-CM

## 2022-11-08 DIAGNOSIS — D70.9 NEUTROPENIA (HCC): ICD-10-CM

## 2022-11-08 DIAGNOSIS — E83.42 HYPOMAGNESEMIA: ICD-10-CM

## 2022-11-08 DIAGNOSIS — Z94.0 KIDNEY TRANSPLANTED: ICD-10-CM

## 2022-11-08 DIAGNOSIS — E55.9 VITAMIN D DEFICIENCY: ICD-10-CM

## 2022-11-08 LAB
ANION GAP SERPL CALC-SCNC: 3 MMOL/L (ref 0–18)
BASOPHILS # BLD AUTO: 0.02 X10(3) UL (ref 0–0.2)
BASOPHILS NFR BLD AUTO: 0.4 %
BILIRUB UR QL STRIP.AUTO: NEGATIVE
BUN BLD-MCNC: 14 MG/DL (ref 7–18)
CALCIUM BLD-MCNC: 8.3 MG/DL (ref 8.5–10.1)
CHLORIDE SERPL-SCNC: 104 MMOL/L (ref 98–112)
CLARITY UR REFRACT.AUTO: CLEAR
CO2 SERPL-SCNC: 29 MMOL/L (ref 21–32)
CREAT BLD-MCNC: 1.2 MG/DL
CREAT UR-SCNC: 19.9 MG/DL
CREAT UR-SCNC: 19.9 MG/DL
EOSINOPHIL # BLD AUTO: 0.03 X10(3) UL (ref 0–0.7)
EOSINOPHIL NFR BLD AUTO: 0.6 %
ERYTHROCYTE [DISTWIDTH] IN BLOOD BY AUTOMATED COUNT: 13.2 %
FASTING STATUS PATIENT QL REPORTED: NO
GFR SERPLBLD BASED ON 1.73 SQ M-ARVRAT: 71 ML/MIN/1.73M2 (ref 60–?)
GLUCOSE BLD-MCNC: 251 MG/DL (ref 70–99)
GLUCOSE UR STRIP.AUTO-MCNC: 150 MG/DL
HCT VFR BLD AUTO: 36.9 %
HGB BLD-MCNC: 12.3 G/DL
IMM GRANULOCYTES # BLD AUTO: 0.03 X10(3) UL (ref 0–1)
IMM GRANULOCYTES NFR BLD: 0.6 %
KETONES UR STRIP.AUTO-MCNC: NEGATIVE MG/DL
LEUKOCYTE ESTERASE UR QL STRIP.AUTO: NEGATIVE
LYMPHOCYTES # BLD AUTO: 1.25 X10(3) UL (ref 1–4)
LYMPHOCYTES NFR BLD AUTO: 25.6 %
MAGNESIUM SERPL-MCNC: 2 MG/DL (ref 1.6–2.6)
MCH RBC QN AUTO: 28.9 PG (ref 26–34)
MCHC RBC AUTO-ENTMCNC: 33.3 G/DL (ref 31–37)
MCV RBC AUTO: 86.6 FL
MICROALBUMIN UR-MCNC: 3.65 MG/DL
MICROALBUMIN/CREAT 24H UR-RTO: 183.4 UG/MG (ref ?–30)
MONOCYTES # BLD AUTO: 0.33 X10(3) UL (ref 0.1–1)
MONOCYTES NFR BLD AUTO: 6.8 %
NEUTROPHILS # BLD AUTO: 3.22 X10 (3) UL (ref 1.5–7.7)
NEUTROPHILS # BLD AUTO: 3.22 X10(3) UL (ref 1.5–7.7)
NEUTROPHILS NFR BLD AUTO: 66 %
NITRITE UR QL STRIP.AUTO: NEGATIVE
OSMOLALITY SERPL CALC.SUM OF ELEC: 291 MOSM/KG (ref 275–295)
PH UR STRIP.AUTO: 7 [PH] (ref 5–8)
PHOSPHATE SERPL-MCNC: 3.7 MG/DL (ref 2.5–4.9)
PLATELET # BLD AUTO: 137 10(3)UL (ref 150–450)
POTASSIUM SERPL-SCNC: 4.4 MMOL/L (ref 3.5–5.1)
PROT UR STRIP.AUTO-MCNC: NEGATIVE MG/DL
PROT UR-MCNC: 6.4 MG/DL
RBC # BLD AUTO: 4.26 X10(6)UL
RBC UR QL AUTO: NEGATIVE
SODIUM SERPL-SCNC: 136 MMOL/L (ref 136–145)
SP GR UR STRIP.AUTO: 1 (ref 1–1.03)
UROBILINOGEN UR STRIP.AUTO-MCNC: <2 MG/DL
WBC # BLD AUTO: 4.9 X10(3) UL (ref 4–11)

## 2022-11-08 PROCEDURE — 83735 ASSAY OF MAGNESIUM: CPT

## 2022-11-08 PROCEDURE — 87086 URINE CULTURE/COLONY COUNT: CPT

## 2022-11-08 PROCEDURE — 81003 URINALYSIS AUTO W/O SCOPE: CPT

## 2022-11-08 PROCEDURE — 85025 COMPLETE CBC W/AUTO DIFF WBC: CPT

## 2022-11-08 PROCEDURE — 82043 UR ALBUMIN QUANTITATIVE: CPT

## 2022-11-08 PROCEDURE — 80197 ASSAY OF TACROLIMUS: CPT

## 2022-11-08 PROCEDURE — 82570 ASSAY OF URINE CREATININE: CPT

## 2022-11-08 PROCEDURE — 84100 ASSAY OF PHOSPHORUS: CPT

## 2022-11-08 PROCEDURE — 84156 ASSAY OF PROTEIN URINE: CPT

## 2022-11-08 PROCEDURE — 36415 COLL VENOUS BLD VENIPUNCTURE: CPT

## 2022-11-08 PROCEDURE — 80048 BASIC METABOLIC PNL TOTAL CA: CPT

## 2022-11-10 LAB — TACROLIMUS: 4.7 NG/ML

## 2022-12-05 ENCOUNTER — LAB ENCOUNTER (OUTPATIENT)
Dept: LAB | Age: 57
End: 2022-12-05
Attending: INTERNAL MEDICINE
Payer: MEDICARE

## 2022-12-05 DIAGNOSIS — B25.9 CMV INFECTION (HCC): ICD-10-CM

## 2022-12-05 DIAGNOSIS — R73.9 HYPERGLYCEMIA: ICD-10-CM

## 2022-12-05 DIAGNOSIS — E55.9 VITAMIN D DEFICIENCY: ICD-10-CM

## 2022-12-05 DIAGNOSIS — R78.81 BACTEREMIA: ICD-10-CM

## 2022-12-05 DIAGNOSIS — B34.8 BK VIREMIA: ICD-10-CM

## 2022-12-05 DIAGNOSIS — D70.9 NEUTROPENIA (HCC): ICD-10-CM

## 2022-12-05 DIAGNOSIS — E83.42 HYPOMAGNESEMIA: ICD-10-CM

## 2022-12-05 DIAGNOSIS — Z51.81 ENCOUNTER FOR THERAPEUTIC DRUG MONITORING: ICD-10-CM

## 2022-12-05 DIAGNOSIS — Z94.0 KIDNEY TRANSPLANTED: ICD-10-CM

## 2022-12-05 DIAGNOSIS — E78.5 HYPERLIPIDEMIA: ICD-10-CM

## 2022-12-05 DIAGNOSIS — R80.9 PROTEINURIA: ICD-10-CM

## 2022-12-05 DIAGNOSIS — N39.0 URINARY TRACT INFECTION: ICD-10-CM

## 2022-12-05 LAB
ANION GAP SERPL CALC-SCNC: 7 MMOL/L (ref 0–18)
BASOPHILS # BLD AUTO: 0.02 X10(3) UL (ref 0–0.2)
BASOPHILS NFR BLD AUTO: 0.3 %
BILIRUB UR QL STRIP.AUTO: NEGATIVE
BUN BLD-MCNC: 16 MG/DL (ref 7–18)
CALCIUM BLD-MCNC: 8.6 MG/DL (ref 8.5–10.1)
CHLORIDE SERPL-SCNC: 103 MMOL/L (ref 98–112)
CHOLEST SERPL-MCNC: 95 MG/DL (ref ?–200)
CLARITY UR REFRACT.AUTO: CLEAR
CO2 SERPL-SCNC: 26 MMOL/L (ref 21–32)
COLOR UR AUTO: YELLOW
CREAT BLD-MCNC: 1.32 MG/DL
CREAT UR-SCNC: 32.6 MG/DL
CREAT UR-SCNC: 32.6 MG/DL
EOSINOPHIL # BLD AUTO: 0.05 X10(3) UL (ref 0–0.7)
EOSINOPHIL NFR BLD AUTO: 0.9 %
ERYTHROCYTE [DISTWIDTH] IN BLOOD BY AUTOMATED COUNT: 13.2 %
EST. AVERAGE GLUCOSE BLD GHB EST-MCNC: 177 MG/DL (ref 68–126)
FASTING PATIENT LIPID ANSWER: YES
FASTING STATUS PATIENT QL REPORTED: YES
GFR SERPLBLD BASED ON 1.73 SQ M-ARVRAT: 64 ML/MIN/1.73M2 (ref 60–?)
GLUCOSE BLD-MCNC: 312 MG/DL (ref 70–99)
GLUCOSE UR STRIP.AUTO-MCNC: 500 MG/DL
HBA1C MFR BLD: 7.8 % (ref ?–5.7)
HCT VFR BLD AUTO: 38.5 %
HDLC SERPL-MCNC: 46 MG/DL (ref 40–59)
HGB BLD-MCNC: 12.7 G/DL
IMM GRANULOCYTES # BLD AUTO: 0.02 X10(3) UL (ref 0–1)
IMM GRANULOCYTES NFR BLD: 0.3 %
KETONES UR STRIP.AUTO-MCNC: NEGATIVE MG/DL
LDLC SERPL CALC-MCNC: 22 MG/DL (ref ?–100)
LEUKOCYTE ESTERASE UR QL STRIP.AUTO: NEGATIVE
LYMPHOCYTES # BLD AUTO: 1.01 X10(3) UL (ref 1–4)
LYMPHOCYTES NFR BLD AUTO: 17.4 %
MAGNESIUM SERPL-MCNC: 1.6 MG/DL (ref 1.6–2.6)
MCH RBC QN AUTO: 28.3 PG (ref 26–34)
MCHC RBC AUTO-ENTMCNC: 33 G/DL (ref 31–37)
MCV RBC AUTO: 85.9 FL
MICROALBUMIN UR-MCNC: 3.37 MG/DL
MICROALBUMIN/CREAT 24H UR-RTO: 103.4 UG/MG (ref ?–30)
MONOCYTES # BLD AUTO: 0.31 X10(3) UL (ref 0.1–1)
MONOCYTES NFR BLD AUTO: 5.3 %
NEUTROPHILS # BLD AUTO: 4.4 X10 (3) UL (ref 1.5–7.7)
NEUTROPHILS # BLD AUTO: 4.4 X10(3) UL (ref 1.5–7.7)
NEUTROPHILS NFR BLD AUTO: 75.8 %
NITRITE UR QL STRIP.AUTO: NEGATIVE
NONHDLC SERPL-MCNC: 49 MG/DL (ref ?–130)
OSMOLALITY SERPL CALC.SUM OF ELEC: 295 MOSM/KG (ref 275–295)
PH UR STRIP.AUTO: 6 [PH] (ref 5–8)
PHOSPHATE SERPL-MCNC: 4.1 MG/DL (ref 2.5–4.9)
PLATELET # BLD AUTO: 156 10(3)UL (ref 150–450)
POTASSIUM SERPL-SCNC: 4.3 MMOL/L (ref 3.5–5.1)
PROT UR STRIP.AUTO-MCNC: NEGATIVE MG/DL
PROT UR-MCNC: 6.4 MG/DL
PTH-INTACT SERPL-MCNC: 38.6 PG/ML (ref 18.5–88)
RBC # BLD AUTO: 4.48 X10(6)UL
RBC UR QL AUTO: NEGATIVE
SODIUM SERPL-SCNC: 136 MMOL/L (ref 136–145)
SP GR UR STRIP.AUTO: 1.01 (ref 1–1.03)
TRIGL SERPL-MCNC: 162 MG/DL (ref 30–149)
UROBILINOGEN UR STRIP.AUTO-MCNC: 0.2 MG/DL
VIT D+METAB SERPL-MCNC: 36.5 NG/ML (ref 30–100)
VLDLC SERPL CALC-MCNC: 21 MG/DL (ref 0–30)
WBC # BLD AUTO: 5.8 X10(3) UL (ref 4–11)

## 2022-12-05 PROCEDURE — 83970 ASSAY OF PARATHORMONE: CPT

## 2022-12-05 PROCEDURE — 3060F POS MICROALBUMINURIA REV: CPT | Performed by: INTERNAL MEDICINE

## 2022-12-05 PROCEDURE — 87086 URINE CULTURE/COLONY COUNT: CPT

## 2022-12-05 PROCEDURE — 36415 COLL VENOUS BLD VENIPUNCTURE: CPT

## 2022-12-05 PROCEDURE — 80048 BASIC METABOLIC PNL TOTAL CA: CPT

## 2022-12-05 PROCEDURE — 85025 COMPLETE CBC W/AUTO DIFF WBC: CPT

## 2022-12-05 PROCEDURE — 80061 LIPID PANEL: CPT

## 2022-12-05 PROCEDURE — 82570 ASSAY OF URINE CREATININE: CPT

## 2022-12-05 PROCEDURE — 3051F HG A1C>EQUAL 7.0%<8.0%: CPT | Performed by: INTERNAL MEDICINE

## 2022-12-05 PROCEDURE — 84156 ASSAY OF PROTEIN URINE: CPT

## 2022-12-05 PROCEDURE — 82043 UR ALBUMIN QUANTITATIVE: CPT

## 2022-12-05 PROCEDURE — 83036 HEMOGLOBIN GLYCOSYLATED A1C: CPT

## 2022-12-05 PROCEDURE — 80197 ASSAY OF TACROLIMUS: CPT

## 2022-12-05 PROCEDURE — 81003 URINALYSIS AUTO W/O SCOPE: CPT

## 2022-12-05 PROCEDURE — 83735 ASSAY OF MAGNESIUM: CPT

## 2022-12-05 PROCEDURE — 3061F NEG MICROALBUMINURIA REV: CPT | Performed by: INTERNAL MEDICINE

## 2022-12-05 PROCEDURE — 84100 ASSAY OF PHOSPHORUS: CPT

## 2022-12-05 PROCEDURE — 82306 VITAMIN D 25 HYDROXY: CPT

## 2022-12-07 LAB — TACROLIMUS: 6.4 NG/ML

## 2022-12-09 ENCOUNTER — TELEPHONE (OUTPATIENT)
Dept: ENDOCRINOLOGY CLINIC | Facility: CLINIC | Age: 57
End: 2022-12-09

## 2022-12-09 ENCOUNTER — OFFICE VISIT (OUTPATIENT)
Dept: ENDOCRINOLOGY CLINIC | Facility: CLINIC | Age: 57
End: 2022-12-09
Payer: MEDICARE

## 2022-12-09 VITALS
DIASTOLIC BLOOD PRESSURE: 78 MMHG | BODY MASS INDEX: 35 KG/M2 | HEART RATE: 75 BPM | WEIGHT: 183.19 LBS | SYSTOLIC BLOOD PRESSURE: 124 MMHG

## 2022-12-09 DIAGNOSIS — E78.5 DYSLIPIDEMIA: ICD-10-CM

## 2022-12-09 DIAGNOSIS — Z79.4 TYPE 2 DIABETES MELLITUS WITH OTHER SPECIFIED COMPLICATION, WITH LONG-TERM CURRENT USE OF INSULIN (HCC): Primary | ICD-10-CM

## 2022-12-09 DIAGNOSIS — E11.69 TYPE 2 DIABETES MELLITUS WITH OTHER SPECIFIED COMPLICATION, WITH LONG-TERM CURRENT USE OF INSULIN (HCC): Primary | ICD-10-CM

## 2022-12-09 PROCEDURE — 3078F DIAST BP <80 MM HG: CPT | Performed by: INTERNAL MEDICINE

## 2022-12-09 PROCEDURE — 99204 OFFICE O/P NEW MOD 45 MIN: CPT | Performed by: INTERNAL MEDICINE

## 2022-12-09 PROCEDURE — 3074F SYST BP LT 130 MM HG: CPT | Performed by: INTERNAL MEDICINE

## 2022-12-09 RX ORDER — DULAGLUTIDE 0.75 MG/.5ML
0.75 INJECTION, SOLUTION SUBCUTANEOUS WEEKLY
Qty: 2 ML | Refills: 0 | Status: SHIPPED | OUTPATIENT
Start: 2022-12-09

## 2022-12-09 NOTE — PROGRESS NOTES
Received verbal order from Dr. Carlotta Hester to start [facility] 2 for patient using sensor sample for home use to monitor BG. He is s/p renal transplant. Process explained to patient including care of sensor and also explained that device tests interstitial tissue/not blood so to always test blood w/ finger prick if unsure of reading and symptoms. Pt verbalized understanding. Sensor placed on upper left arm and patient tolerated well. RN also demonstrated to patient how to start new sensor in 14 days and how to remove it. RN discussed rotating sites for sensor placement. RN also discussed scanning sensor at least once every 8 hours but can scan as many times as he wants. Also explained the significance of trend arrows, color coding and magnifying glass. Lastly, pt was made aware that order for sensors will be end to [facility] and will contact him to confirm the order. Pt will follow up with CDE in one week per Dr. Carlotta Hester and this was scheduled. Pt requested to be seen Thursday since he will be in the area for oph appointment. RN also provided Trulicity pen teaching as he will be starting on it. Explained potential PA requirement. Pt voiced understanding to everything discussed and denied questions.     Time spent with patient discussing above: 30 minutes

## 2022-12-10 NOTE — TELEPHONE ENCOUNTER
Patient's novolog was stopped  He was started on trulicity   Since he is on the lower dose of trulicity, he can please use the novolog per scale as follows:     Pre meal sugar   150-200: take 1 unit  201-250: take 2 units  > 251: take 3 units

## 2022-12-12 ENCOUNTER — TELEPHONE (OUTPATIENT)
Dept: ENDOCRINOLOGY CLINIC | Facility: CLINIC | Age: 57
End: 2022-12-12

## 2022-12-13 NOTE — TELEPHONE ENCOUNTER
Patient requested for CDE appointment to be rescheduled due to appointment being at 10 am and patient having an eye doctor appointment and 10:40 am. Patient was rescheduled. Also went over directions that were provided by Dr. Ariel Qureshi in prior TE 12/9 regarding Novolog.

## 2022-12-14 NOTE — PROGRESS NOTES
Continuous Glucose Monitor Download    Start Time: 1:00pm  End Time: 1:58pm    Indication: Pt is T2D followed by Dr Aziza Golden. At last appointment AM 12/9/2022, Gian Lockhart was stopped and he was started on a low dose of Trulicity and advised correction factor for Novolog as below. He was started on Libre2 while in clinic. Appointment today for Freya. A1c: 7.8% (12/9/2022)    Current Diabetes Medication:  Novolog CF   150-200 mg/dl: 1 unit   201-250 mg/dl: 2 unit   > 251 mg/dl: 3 unit  Lantus 28 units daily (PM)  Trulicity 9.22WD/ZVOJEE (Started on Friday)    Sensor Type: Libre2    Reviewed CGMS download and patient logs:  Days of sensor use: 6 days  Average glucose (mg/dL): 130 mg/dl  Standard deviation =/- 20.6%  Target Ranges:  mg/dL          94% of time in range  6% of time below range  0% of time above range      Interpretation:  - Minimal scanning during the day  - Blood sugar steady during the day  - Fasting blood sugar  mg/dl  - Pt has been checking with meter before meals which is 108-125 mg/dl  - No instances of low blood sugar    Recommended medication changes/Plan:  - Pt thought he should only be scanning once in the morning as he was having difficulty scanning mid day  - Advised to scan at least once every 8 hours; reviewed and demonstrated scanning while in office; pt had been scanning incorrectly and it was not picking up the sensor  - Reviewed target blood sugars  - Stop Gian Lockhart as pt has been taking which was advised to stop at [facility] 12/9/2022   - Reviewed MOA of Tradjenta and Trulicity  - Pt had completed Jackie PAP through [employer]. Reviewed that [employer] is a company that charges fee to get free [employer] PAP program. See TE.  Pt will call and cancel   - Libre2 sensor sample given while in office and pt will call [facility] if he does not hear by next week  - Update in 2 weeks and sooner if low/high blood sugars occurr    Updated Diabetes Medication:  Novolog CF   150-200 mg/dl: 1 unit   201-250 mg/dl: 2 unit   > 251 mg/dl: 3 unit  Lantus 28 units daily (PM)  Trulicity 6.42JM/HHYSVM (Started on Friday)    Follow up:  Scheduled 3/14 with Dr Ethel Vallecillo

## 2022-12-15 ENCOUNTER — NURSE ONLY (OUTPATIENT)
Dept: ENDOCRINOLOGY CLINIC | Facility: CLINIC | Age: 57
End: 2022-12-15
Payer: MEDICARE

## 2022-12-15 ENCOUNTER — TELEPHONE (OUTPATIENT)
Dept: ENDOCRINOLOGY CLINIC | Facility: CLINIC | Age: 57
End: 2022-12-15

## 2022-12-15 DIAGNOSIS — E11.69 TYPE 2 DIABETES MELLITUS WITH OTHER SPECIFIED COMPLICATION, WITH LONG-TERM CURRENT USE OF INSULIN (HCC): Primary | ICD-10-CM

## 2022-12-15 DIAGNOSIS — Z79.4 TYPE 2 DIABETES MELLITUS WITH OTHER SPECIFIED COMPLICATION, WITH LONG-TERM CURRENT USE OF INSULIN (HCC): Primary | ICD-10-CM

## 2022-12-15 PROCEDURE — 99211 OFF/OP EST MAY X REQ PHY/QHP: CPT | Performed by: INTERNAL MEDICINE

## 2022-12-15 NOTE — TELEPHONE ENCOUNTER
[facility] [address]     Placed in Dr Arti Mckeon folder for signature and to be faxed to [phone]

## 2022-12-15 NOTE — PATIENT INSTRUCTIONS
- Scan at least every 8 hours   -- Before meals  mg/dl   -- 2 hours after a meal <180 mg/dl    - Call [facility] if you do not hear from them: [phone]    - Trulicity paperwork will be signed by Dr Adan Clinton and faxed tomorrow 12/16/2022    - Esme Osman sensor given while in office    - Update in 2 weeks and sooner with ongoing high or low blood sugar

## 2022-12-21 ENCOUNTER — TELEPHONE (OUTPATIENT)
Dept: ENDOCRINOLOGY CLINIC | Facility: CLINIC | Age: 57
End: 2022-12-21

## 2022-12-21 NOTE — TELEPHONE ENCOUNTER
Dr. Bennett Morning     3/4 days ago:  Lantus 28 units, 45 min, BG dropped 53, 46, drank coca cola and glucose tablet, BG increased. 2 events with hypoglycemia:   December 15 and 18th   Symptoms: dizziness      Patient stated he changed Lantus units  12/19: 14 units at night  12/20: 20 units at night     Patient stating feeling okay. Dr. Samantha Carbajal states verbally:  -stay on 20 units Lantus and to call with blood sugars <80       Called patient back, verbalized understanding and acknowledged.

## 2022-12-21 NOTE — TELEPHONE ENCOUNTER
Pt is calling because his levels on the @Pay sensor are very high or low. Wanted to speak with nurse to adjust the levels. Also wanting to hear if they heard back in regards from [facility] for his trulicity.

## 2022-12-27 ENCOUNTER — TELEPHONE (OUTPATIENT)
Dept: ENDOCRINOLOGY CLINIC | Facility: CLINIC | Age: 57
End: 2022-12-27

## 2022-12-27 NOTE — TELEPHONE ENCOUNTER
Patient checking to see if Dr Quinn Pederson office received response regarding financial assistance forms for Trulicity. Please call. Thank you.

## 2022-12-29 ENCOUNTER — LAB ENCOUNTER (OUTPATIENT)
Dept: LAB | Age: 57
End: 2022-12-29
Attending: INTERNAL MEDICINE
Payer: MEDICARE

## 2022-12-29 DIAGNOSIS — R73.9 HYPERGLYCEMIA: ICD-10-CM

## 2022-12-29 DIAGNOSIS — N39.0 URINARY TRACT INFECTION: ICD-10-CM

## 2022-12-29 DIAGNOSIS — Z94.0 KIDNEY TRANSPLANTED: ICD-10-CM

## 2022-12-29 DIAGNOSIS — R78.81 BACTEREMIA: ICD-10-CM

## 2022-12-29 DIAGNOSIS — R80.9 PROTEINURIA: ICD-10-CM

## 2022-12-29 DIAGNOSIS — D70.9 NEUTROPENIA (HCC): ICD-10-CM

## 2022-12-29 DIAGNOSIS — E78.5 HYPERLIPIDEMIA: ICD-10-CM

## 2022-12-29 DIAGNOSIS — E55.9 VITAMIN D DEFICIENCY: ICD-10-CM

## 2022-12-29 DIAGNOSIS — E83.42 HYPOMAGNESEMIA: ICD-10-CM

## 2022-12-29 DIAGNOSIS — B34.8 BK VIREMIA: ICD-10-CM

## 2022-12-29 DIAGNOSIS — Z51.81 ENCOUNTER FOR THERAPEUTIC DRUG MONITORING: ICD-10-CM

## 2022-12-29 DIAGNOSIS — B25.9 CMV INFECTION (HCC): ICD-10-CM

## 2022-12-29 LAB
ANION GAP SERPL CALC-SCNC: 4 MMOL/L (ref 0–18)
BASOPHILS # BLD AUTO: 0.03 X10(3) UL (ref 0–0.2)
BASOPHILS NFR BLD AUTO: 0.5 %
BILIRUB UR QL STRIP.AUTO: NEGATIVE
BUN BLD-MCNC: 13 MG/DL (ref 7–18)
CALCIUM BLD-MCNC: 9.2 MG/DL (ref 8.5–10.1)
CHLORIDE SERPL-SCNC: 106 MMOL/L (ref 98–112)
CLARITY UR REFRACT.AUTO: CLEAR
CO2 SERPL-SCNC: 27 MMOL/L (ref 21–32)
CREAT BLD-MCNC: 1.23 MG/DL
CREAT UR-SCNC: 36.4 MG/DL
CREAT UR-SCNC: 36.4 MG/DL
EOSINOPHIL # BLD AUTO: 0.06 X10(3) UL (ref 0–0.7)
EOSINOPHIL NFR BLD AUTO: 1 %
ERYTHROCYTE [DISTWIDTH] IN BLOOD BY AUTOMATED COUNT: 13.4 %
FASTING STATUS PATIENT QL REPORTED: NO
GFR SERPLBLD BASED ON 1.73 SQ M-ARVRAT: 69 ML/MIN/1.73M2 (ref 60–?)
GLUCOSE BLD-MCNC: 101 MG/DL (ref 70–99)
GLUCOSE UR STRIP.AUTO-MCNC: NEGATIVE MG/DL
HCT VFR BLD AUTO: 38.1 %
HGB BLD-MCNC: 12.6 G/DL
IMM GRANULOCYTES # BLD AUTO: 0.02 X10(3) UL (ref 0–1)
IMM GRANULOCYTES NFR BLD: 0.3 %
KETONES UR STRIP.AUTO-MCNC: NEGATIVE MG/DL
LEUKOCYTE ESTERASE UR QL STRIP.AUTO: NEGATIVE
LYMPHOCYTES # BLD AUTO: 1.36 X10(3) UL (ref 1–4)
LYMPHOCYTES NFR BLD AUTO: 22.9 %
MAGNESIUM SERPL-MCNC: 2.2 MG/DL (ref 1.6–2.6)
MCH RBC QN AUTO: 28.9 PG (ref 26–34)
MCHC RBC AUTO-ENTMCNC: 33.1 G/DL (ref 31–37)
MCV RBC AUTO: 87.4 FL
MICROALBUMIN UR-MCNC: 9.96 MG/DL
MICROALBUMIN/CREAT 24H UR-RTO: 273.6 UG/MG (ref ?–30)
MONOCYTES # BLD AUTO: 0.41 X10(3) UL (ref 0.1–1)
MONOCYTES NFR BLD AUTO: 6.9 %
NEUTROPHILS # BLD AUTO: 4.07 X10 (3) UL (ref 1.5–7.7)
NEUTROPHILS # BLD AUTO: 4.07 X10(3) UL (ref 1.5–7.7)
NEUTROPHILS NFR BLD AUTO: 68.4 %
NITRITE UR QL STRIP.AUTO: NEGATIVE
OSMOLALITY SERPL CALC.SUM OF ELEC: 284 MOSM/KG (ref 275–295)
PH UR STRIP.AUTO: 7 [PH] (ref 5–8)
PHOSPHATE SERPL-MCNC: 4.5 MG/DL (ref 2.5–4.9)
PLATELET # BLD AUTO: 156 10(3)UL (ref 150–450)
POTASSIUM SERPL-SCNC: 4.3 MMOL/L (ref 3.5–5.1)
PROT UR STRIP.AUTO-MCNC: NEGATIVE MG/DL
PROT UR-MCNC: 16.8 MG/DL
RBC # BLD AUTO: 4.36 X10(6)UL
RBC UR QL AUTO: NEGATIVE
SODIUM SERPL-SCNC: 137 MMOL/L (ref 136–145)
SP GR UR STRIP.AUTO: 1 (ref 1–1.03)
UROBILINOGEN UR STRIP.AUTO-MCNC: <2 MG/DL
WBC # BLD AUTO: 6 X10(3) UL (ref 4–11)

## 2022-12-29 PROCEDURE — 80197 ASSAY OF TACROLIMUS: CPT

## 2022-12-29 PROCEDURE — 36415 COLL VENOUS BLD VENIPUNCTURE: CPT

## 2022-12-29 PROCEDURE — 83735 ASSAY OF MAGNESIUM: CPT

## 2022-12-29 PROCEDURE — 81003 URINALYSIS AUTO W/O SCOPE: CPT

## 2022-12-29 PROCEDURE — 82570 ASSAY OF URINE CREATININE: CPT

## 2022-12-29 PROCEDURE — 84100 ASSAY OF PHOSPHORUS: CPT

## 2022-12-29 PROCEDURE — 84156 ASSAY OF PROTEIN URINE: CPT

## 2022-12-29 PROCEDURE — 82043 UR ALBUMIN QUANTITATIVE: CPT

## 2022-12-29 PROCEDURE — 80048 BASIC METABOLIC PNL TOTAL CA: CPT

## 2022-12-29 PROCEDURE — 85025 COMPLETE CBC W/AUTO DIFF WBC: CPT

## 2022-12-31 LAB — TACROLIMUS: 7.3 NG/ML

## 2023-01-09 ENCOUNTER — PATIENT MESSAGE (OUTPATIENT)
Dept: ENDOCRINOLOGY CLINIC | Facility: CLINIC | Age: 58
End: 2023-01-09

## 2023-01-10 ENCOUNTER — TELEPHONE (OUTPATIENT)
Dept: ENDOCRINOLOGY CLINIC | Facility: CLINIC | Age: 58
End: 2023-01-10

## 2023-01-10 DIAGNOSIS — Z79.4 TYPE 2 DIABETES MELLITUS WITH OTHER SPECIFIED COMPLICATION, WITH LONG-TERM CURRENT USE OF INSULIN (HCC): Primary | ICD-10-CM

## 2023-01-10 DIAGNOSIS — E11.69 TYPE 2 DIABETES MELLITUS WITH OTHER SPECIFIED COMPLICATION, WITH LONG-TERM CURRENT USE OF INSULIN (HCC): Primary | ICD-10-CM

## 2023-01-10 NOTE — TELEPHONE ENCOUNTER
Also see 1/9/2023 [employer] Message - patient check status on message - states new rx with new dosage info for Trulicity. Patient requesting office notes and rx to be fax to [phone], [facility] attn: Neela Dukes. Please call. Thank you.

## 2023-01-10 NOTE — TELEPHONE ENCOUNTER
From: Kelsea Santos  To: Sherine Orozco MD  Sent: 1/9/2023 10:13 AM CST  Subject: Trulicity, assistance program     Good morning Dr. Gurmeet Rogers. My name is Serge Monreal 02/09/1965. I would like to know if you have received any information regarding the request for the trulicity medication from the [place] program. Thanks.

## 2023-01-10 NOTE — TELEPHONE ENCOUNTER
Per chart review, form was faxed 12/16/22. Tried contacting [employer] at [phone] and RN was on hold for almost an hour. RN will try again later and gave an update to patient to also give [employer] a call to follow up. Pt was advised that RN will have to hang up the phone soon as nurse visits will soon come in and depending on staffing can call Sherice again later.

## 2023-01-12 NOTE — TELEPHONE ENCOUNTER
Dr. Martin Guerra,  Patient asking for RX for trulicity 7.9BS dose to be printed and faxed to [facility] (fax # [phone]) - patient was told they may be able help with trulicity RX  We are still waiting to hear about status of [employer] PAP application  Patient was not able to  RX for trulicity 5.44CZ d/t cost - he was able to get 2 sample pens of 6.4QM trulicity and used them  [address] report dtd 12/30/22 - 1/12/23 placed in your folder for review:  Time in Range:  Very high > 250  5%  High 181 - 250  18%  Target range 70 - 180 77%  Low 54 - 69  0%  Very low < 54  0%    Please advise if ok to print and fax RX for trulicity 1.8LG -thanks      OV note dtd 12/9/22 faxed to [facility] at [phone]

## 2023-01-13 RX ORDER — DULAGLUTIDE 1.5 MG/.5ML
1.5 INJECTION, SOLUTION SUBCUTANEOUS WEEKLY
Qty: 2 ML | Refills: 0 | Status: SHIPPED | OUTPATIENT
Start: 2023-01-13

## 2023-01-13 NOTE — TELEPHONE ENCOUNTER
RX for trulicity 5.6AP printed and faxed to [facility] at [phone]    [facility] sent to patient advising RX sent

## 2023-01-18 NOTE — TELEPHONE ENCOUNTER
Sent sergio to patient to contact [facility] at [phone] to get an update on his applications

## 2023-02-24 NOTE — TELEPHONE ENCOUNTER
Dr. [place]    Please see patient message. [facility] is no longer providing trulicity assistance for new patients.

## 2023-02-24 NOTE — TELEPHONE ENCOUNTER
Will victoza be cheaper  We can try victoza 0.6 mg daily for one week, then 1.2 mg daily x oe week and then 1.8 mg daily after that   Thanks

## 2023-02-28 ENCOUNTER — LAB ENCOUNTER (OUTPATIENT)
Dept: LAB | Age: 58
End: 2023-02-28
Attending: INTERNAL MEDICINE
Payer: MEDICARE

## 2023-02-28 DIAGNOSIS — R78.81 BACTEREMIA: ICD-10-CM

## 2023-02-28 DIAGNOSIS — N39.0 URINARY TRACT INFECTION: ICD-10-CM

## 2023-02-28 DIAGNOSIS — Z51.81 ENCOUNTER FOR THERAPEUTIC DRUG MONITORING: ICD-10-CM

## 2023-02-28 DIAGNOSIS — E83.42 HYPOMAGNESEMIA: ICD-10-CM

## 2023-02-28 DIAGNOSIS — B34.8 BK VIREMIA: ICD-10-CM

## 2023-02-28 DIAGNOSIS — E55.9 VITAMIN D DEFICIENCY: ICD-10-CM

## 2023-02-28 DIAGNOSIS — D70.9 NEUTROPENIA (HCC): ICD-10-CM

## 2023-02-28 DIAGNOSIS — B25.9 CMV INFECTION (HCC): ICD-10-CM

## 2023-02-28 DIAGNOSIS — R73.9 HYPERGLYCEMIA: ICD-10-CM

## 2023-02-28 DIAGNOSIS — Z94.0 KIDNEY TRANSPLANTED: ICD-10-CM

## 2023-02-28 DIAGNOSIS — R80.9 PROTEINURIA: ICD-10-CM

## 2023-02-28 DIAGNOSIS — E78.5 HYPERLIPIDEMIA: ICD-10-CM

## 2023-02-28 LAB
ANION GAP SERPL CALC-SCNC: 7 MMOL/L (ref 0–18)
BASOPHILS # BLD AUTO: 0.03 X10(3) UL (ref 0–0.2)
BASOPHILS NFR BLD AUTO: 0.4 %
BILIRUB UR QL STRIP.AUTO: NEGATIVE
BUN BLD-MCNC: 20 MG/DL (ref 7–18)
CALCIUM BLD-MCNC: 8.7 MG/DL (ref 8.5–10.1)
CHLORIDE SERPL-SCNC: 107 MMOL/L (ref 98–112)
CLARITY UR REFRACT.AUTO: CLEAR
CO2 SERPL-SCNC: 22 MMOL/L (ref 21–32)
COLOR UR AUTO: YELLOW
CREAT BLD-MCNC: 1.12 MG/DL
CREAT UR-SCNC: 139 MG/DL
CREAT UR-SCNC: 139 MG/DL
EOSINOPHIL # BLD AUTO: 0.04 X10(3) UL (ref 0–0.7)
EOSINOPHIL NFR BLD AUTO: 0.6 %
ERYTHROCYTE [DISTWIDTH] IN BLOOD BY AUTOMATED COUNT: 13.1 %
FASTING STATUS PATIENT QL REPORTED: NO
GFR SERPLBLD BASED ON 1.73 SQ M-ARVRAT: 77 ML/MIN/1.73M2 (ref 60–?)
GLUCOSE BLD-MCNC: 166 MG/DL (ref 70–99)
GLUCOSE UR STRIP.AUTO-MCNC: NEGATIVE MG/DL
HCT VFR BLD AUTO: 38.6 %
HGB BLD-MCNC: 12.7 G/DL
IMM GRANULOCYTES # BLD AUTO: 0.03 X10(3) UL (ref 0–1)
IMM GRANULOCYTES NFR BLD: 0.4 %
KETONES UR STRIP.AUTO-MCNC: NEGATIVE MG/DL
LEUKOCYTE ESTERASE UR QL STRIP.AUTO: NEGATIVE
LYMPHOCYTES # BLD AUTO: 1.31 X10(3) UL (ref 1–4)
LYMPHOCYTES NFR BLD AUTO: 18.1 %
MAGNESIUM SERPL-MCNC: 1.9 MG/DL (ref 1.6–2.6)
MCH RBC QN AUTO: 28.7 PG (ref 26–34)
MCHC RBC AUTO-ENTMCNC: 32.9 G/DL (ref 31–37)
MCV RBC AUTO: 87.3 FL
MICROALBUMIN UR-MCNC: 8.46 MG/DL
MICROALBUMIN/CREAT 24H UR-RTO: 60.9 UG/MG (ref ?–30)
MONOCYTES # BLD AUTO: 0.49 X10(3) UL (ref 0.1–1)
MONOCYTES NFR BLD AUTO: 6.8 %
NEUTROPHILS # BLD AUTO: 5.34 X10 (3) UL (ref 1.5–7.7)
NEUTROPHILS # BLD AUTO: 5.34 X10(3) UL (ref 1.5–7.7)
NEUTROPHILS NFR BLD AUTO: 73.7 %
NITRITE UR QL STRIP.AUTO: NEGATIVE
OSMOLALITY SERPL CALC.SUM OF ELEC: 288 MOSM/KG (ref 275–295)
PH UR STRIP.AUTO: 6 [PH] (ref 5–8)
PHOSPHATE SERPL-MCNC: 4.3 MG/DL (ref 2.5–4.9)
PLATELET # BLD AUTO: 182 10(3)UL (ref 150–450)
POTASSIUM SERPL-SCNC: 4.1 MMOL/L (ref 3.5–5.1)
PROT UR STRIP.AUTO-MCNC: 30 MG/DL
PROT UR-MCNC: 33.5 MG/DL
RBC # BLD AUTO: 4.42 X10(6)UL
RBC UR QL AUTO: NEGATIVE
SODIUM SERPL-SCNC: 136 MMOL/L (ref 136–145)
SP GR UR STRIP.AUTO: 1.02 (ref 1–1.03)
UROBILINOGEN UR STRIP.AUTO-MCNC: <2 MG/DL
WBC # BLD AUTO: 7.2 X10(3) UL (ref 4–11)

## 2023-02-28 PROCEDURE — 80048 BASIC METABOLIC PNL TOTAL CA: CPT

## 2023-02-28 PROCEDURE — 82570 ASSAY OF URINE CREATININE: CPT

## 2023-02-28 PROCEDURE — 80197 ASSAY OF TACROLIMUS: CPT

## 2023-02-28 PROCEDURE — 3061F NEG MICROALBUMINURIA REV: CPT | Performed by: INTERNAL MEDICINE

## 2023-02-28 PROCEDURE — 81001 URINALYSIS AUTO W/SCOPE: CPT

## 2023-02-28 PROCEDURE — 85025 COMPLETE CBC W/AUTO DIFF WBC: CPT

## 2023-02-28 PROCEDURE — 36415 COLL VENOUS BLD VENIPUNCTURE: CPT

## 2023-02-28 PROCEDURE — 87086 URINE CULTURE/COLONY COUNT: CPT

## 2023-02-28 PROCEDURE — 84100 ASSAY OF PHOSPHORUS: CPT

## 2023-02-28 PROCEDURE — 3060F POS MICROALBUMINURIA REV: CPT | Performed by: INTERNAL MEDICINE

## 2023-02-28 PROCEDURE — 84156 ASSAY OF PROTEIN URINE: CPT

## 2023-02-28 PROCEDURE — 83735 ASSAY OF MAGNESIUM: CPT

## 2023-02-28 PROCEDURE — 82043 UR ALBUMIN QUANTITATIVE: CPT

## 2023-03-02 LAB — TACROLIMUS: 5.9 NG/ML

## 2023-03-07 NOTE — TELEPHONE ENCOUNTER
Dr. [employer] Clinton,     Please see below. Maybe an option would be to switch to ozempic and apply for their patient assistance program.  Something that can be discussed during his appointment next week.

## 2023-03-14 ENCOUNTER — OFFICE VISIT (OUTPATIENT)
Dept: ENDOCRINOLOGY CLINIC | Facility: CLINIC | Age: 58
End: 2023-03-14

## 2023-03-14 VITALS
WEIGHT: 182 LBS | HEIGHT: 61 IN | BODY MASS INDEX: 34.36 KG/M2 | HEART RATE: 88 BPM | SYSTOLIC BLOOD PRESSURE: 154 MMHG | DIASTOLIC BLOOD PRESSURE: 85 MMHG

## 2023-03-14 DIAGNOSIS — E78.5 DYSLIPIDEMIA: ICD-10-CM

## 2023-03-14 DIAGNOSIS — Z79.4 TYPE 2 DIABETES MELLITUS WITH OTHER SPECIFIED COMPLICATION, WITH LONG-TERM CURRENT USE OF INSULIN (HCC): Primary | ICD-10-CM

## 2023-03-14 DIAGNOSIS — E11.69 TYPE 2 DIABETES MELLITUS WITH OTHER SPECIFIED COMPLICATION, WITH LONG-TERM CURRENT USE OF INSULIN (HCC): Primary | ICD-10-CM

## 2023-03-14 LAB
CARTRIDGE LOT#: ABNORMAL NUMERIC
GLUCOSE BLOOD: 373
HEMOGLOBIN A1C: 6.7 % (ref 4.3–5.6)
TEST STRIP LOT #: NORMAL NUMERIC

## 2023-03-14 PROCEDURE — 3044F HG A1C LEVEL LT 7.0%: CPT | Performed by: INTERNAL MEDICINE

## 2023-03-14 PROCEDURE — 83036 HEMOGLOBIN GLYCOSYLATED A1C: CPT | Performed by: INTERNAL MEDICINE

## 2023-03-14 PROCEDURE — 3077F SYST BP >= 140 MM HG: CPT | Performed by: INTERNAL MEDICINE

## 2023-03-14 PROCEDURE — 3079F DIAST BP 80-89 MM HG: CPT | Performed by: INTERNAL MEDICINE

## 2023-03-14 PROCEDURE — 99214 OFFICE O/P EST MOD 30 MIN: CPT | Performed by: INTERNAL MEDICINE

## 2023-03-14 PROCEDURE — 3008F BODY MASS INDEX DOCD: CPT | Performed by: INTERNAL MEDICINE

## 2023-03-14 PROCEDURE — 82947 ASSAY GLUCOSE BLOOD QUANT: CPT | Performed by: INTERNAL MEDICINE

## 2023-03-14 RX ORDER — SEMAGLUTIDE 1.34 MG/ML
0.25 INJECTION, SOLUTION SUBCUTANEOUS WEEKLY
Qty: 1.5 ML | Refills: 0 | COMMUNITY
Start: 2023-03-14

## 2023-03-14 NOTE — PATIENT INSTRUCTIONS
Novolog: based on scale  Pre meal sugar   150-200: take 2 unit of novolog  201-250: take 3 unit of novolog  > 251: take 4 unit of novolog    Lantus 28--> decrease to 25  nightly   Start ozempic 0.25 weekly for 1 week, followed by 0.5 mg weekly     Please update me with sugars in one week     Call if you have sugars under 80    Please return the patient assistance forms for ozempic so that we can fill our part and submit it

## 2023-03-16 ENCOUNTER — TELEPHONE (OUTPATIENT)
Dept: ENDOCRINOLOGY CLINIC | Facility: CLINIC | Age: 58
End: 2023-03-16

## 2023-03-17 ENCOUNTER — TELEPHONE (OUTPATIENT)
Dept: ENDOCRINOLOGY CLINIC | Facility: CLINIC | Age: 58
End: 2023-03-17

## 2023-03-22 NOTE — TELEPHONE ENCOUNTER
Please call patient with update on how his Bg are doing   He was started  On ozmepic 0.25 mg weekly and reccommended to increase to 0.5 mg weekly   I will sign form with the dose of ozmepic that we decide on doing.  Hence will like to see how he is doing in terms of Bg/ SE and what regimen is he on   Thanks

## 2023-03-22 NOTE — TELEPHONE ENCOUNTER
Per Dr. Pia Noble verbal order:  C/w 0.5 mg weekly  Lantus 20 daily  Let us start paperwork for ozmepic 1 mg weekly  Once he has that, we can lower lantus to 15 daily and stop novolog    BG:   3/22:  AM:1 43  1 hour after: 230    3/21:   AM: 106  After breakfast 159    3/20:   AM: 152  After breakfast: 241    Yesterday, patient started 0.5mg- denies symptoms.

## 2023-03-29 ENCOUNTER — TELEPHONE (OUTPATIENT)
Dept: ENDOCRINOLOGY CLINIC | Facility: CLINIC | Age: 58
End: 2023-03-29

## 2023-03-29 NOTE — TELEPHONE ENCOUNTER
Pt is calling requesting to speak with RN in regards to his ozempic. Regarding the assistance for it and if there are samples available at the office.  Has not received his medication yet

## 2023-03-31 NOTE — TELEPHONE ENCOUNTER
Spoke to patient, patient will follow up with [facility] regarding PAP forms. RN provided [employer] number, no further needs.

## 2023-04-06 NOTE — TELEPHONE ENCOUNTER
Patient came to clinic, reports he was contacted by [employer] due \"missing dose info\". Added missing information, and faxed back to [employer].

## 2023-05-12 ENCOUNTER — HOSPITAL ENCOUNTER (EMERGENCY)
Facility: HOSPITAL | Age: 58
Discharge: HOME | End: 2023-05-12
Attending: EMERGENCY MEDICINE
Payer: MEDICARE

## 2023-05-12 ENCOUNTER — APPOINTMENT (OUTPATIENT)
Dept: CT IMAGING | Facility: HOSPITAL | Age: 58
End: 2023-05-12
Attending: EMERGENCY MEDICINE
Payer: MEDICARE

## 2023-05-12 VITALS
RESPIRATION RATE: 16 BRPM | TEMPERATURE: 98 F | BODY MASS INDEX: 33.42 KG/M2 | DIASTOLIC BLOOD PRESSURE: 80 MMHG | WEIGHT: 177 LBS | HEIGHT: 61 IN | OXYGEN SATURATION: 98 % | HEART RATE: 91 BPM | SYSTOLIC BLOOD PRESSURE: 137 MMHG

## 2023-05-12 DIAGNOSIS — R11.2 NAUSEA VOMITING AND DIARRHEA: Primary | ICD-10-CM

## 2023-05-12 DIAGNOSIS — R19.7 NAUSEA VOMITING AND DIARRHEA: Primary | ICD-10-CM

## 2023-05-12 LAB
ALBUMIN SERPL-MCNC: 3.3 G/DL (ref 3.4–5)
ALBUMIN/GLOB SERPL: 0.8 {RATIO} (ref 1–2)
ALP LIVER SERPL-CCNC: 52 U/L
ALT SERPL-CCNC: 21 U/L
ANION GAP SERPL CALC-SCNC: 6 MMOL/L (ref 0–18)
AST SERPL-CCNC: 17 U/L (ref 15–37)
BASOPHILS # BLD AUTO: 0.03 X10(3) UL (ref 0–0.2)
BASOPHILS NFR BLD AUTO: 0.4 %
BILIRUB SERPL-MCNC: 1 MG/DL (ref 0.1–2)
BILIRUB UR QL: NEGATIVE
BUN BLD-MCNC: 17 MG/DL (ref 7–18)
BUN/CREAT SERPL: 14.4 (ref 10–20)
CALCIUM BLD-MCNC: 8.6 MG/DL (ref 8.5–10.1)
CHLORIDE SERPL-SCNC: 102 MMOL/L (ref 98–112)
CLARITY UR: CLEAR
CO2 SERPL-SCNC: 27 MMOL/L (ref 21–32)
COLOR UR: YELLOW
CREAT BLD-MCNC: 1.18 MG/DL
DEPRECATED RDW RBC AUTO: 40.3 FL (ref 35.1–46.3)
EOSINOPHIL # BLD AUTO: 0.17 X10(3) UL (ref 0–0.7)
EOSINOPHIL NFR BLD AUTO: 2.1 %
ERYTHROCYTE [DISTWIDTH] IN BLOOD BY AUTOMATED COUNT: 13.2 % (ref 11–15)
GFR SERPLBLD BASED ON 1.73 SQ M-ARVRAT: 72 ML/MIN/1.73M2 (ref 60–?)
GLOBULIN PLAS-MCNC: 4 G/DL (ref 2.8–4.4)
GLUCOSE BLD-MCNC: 199 MG/DL (ref 70–99)
GLUCOSE UR-MCNC: 100 MG/DL
HCT VFR BLD AUTO: 41.9 %
HGB BLD-MCNC: 13.9 G/DL
HGB UR QL STRIP.AUTO: NEGATIVE
IMM GRANULOCYTES # BLD AUTO: 0.02 X10(3) UL (ref 0–1)
IMM GRANULOCYTES NFR BLD: 0.3 %
KETONES UR-MCNC: NEGATIVE MG/DL
LEUKOCYTE ESTERASE UR QL STRIP.AUTO: NEGATIVE
LYMPHOCYTES # BLD AUTO: 1.25 X10(3) UL (ref 1–4)
LYMPHOCYTES NFR BLD AUTO: 15.8 %
MCH RBC QN AUTO: 28.5 PG (ref 26–34)
MCHC RBC AUTO-ENTMCNC: 33.2 G/DL (ref 31–37)
MCV RBC AUTO: 86 FL
MONOCYTES # BLD AUTO: 1 X10(3) UL (ref 0.1–1)
MONOCYTES NFR BLD AUTO: 12.6 %
NEUTROPHILS # BLD AUTO: 5.46 X10 (3) UL (ref 1.5–7.7)
NEUTROPHILS # BLD AUTO: 5.46 X10(3) UL (ref 1.5–7.7)
NEUTROPHILS NFR BLD AUTO: 68.8 %
NITRITE UR QL STRIP.AUTO: NEGATIVE
OSMOLALITY SERPL CALC.SUM OF ELEC: 287 MOSM/KG (ref 275–295)
PH UR: 5.5 [PH] (ref 5–8)
PLATELET # BLD AUTO: 213 10(3)UL (ref 150–450)
POTASSIUM SERPL-SCNC: 4 MMOL/L (ref 3.5–5.1)
PROT SERPL-MCNC: 7.3 G/DL (ref 6.4–8.2)
PROT UR-MCNC: 20 MG/DL
RBC # BLD AUTO: 4.87 X10(6)UL
SODIUM SERPL-SCNC: 135 MMOL/L (ref 136–145)
SP GR UR STRIP: 1.02 (ref 1–1.03)
UROBILINOGEN UR STRIP-ACNC: NORMAL
WBC # BLD AUTO: 7.9 X10(3) UL (ref 4–11)

## 2023-05-12 PROCEDURE — 99284 EMERGENCY DEPT VISIT MOD MDM: CPT

## 2023-05-12 PROCEDURE — 81001 URINALYSIS AUTO W/SCOPE: CPT | Performed by: EMERGENCY MEDICINE

## 2023-05-12 PROCEDURE — 85025 COMPLETE CBC W/AUTO DIFF WBC: CPT | Performed by: EMERGENCY MEDICINE

## 2023-05-12 PROCEDURE — 87046 STOOL CULTR AEROBIC BACT EA: CPT | Performed by: EMERGENCY MEDICINE

## 2023-05-12 PROCEDURE — 87045 FECES CULTURE AEROBIC BACT: CPT | Performed by: EMERGENCY MEDICINE

## 2023-05-12 PROCEDURE — 87427 SHIGA-LIKE TOXIN AG IA: CPT | Performed by: EMERGENCY MEDICINE

## 2023-05-12 PROCEDURE — 96374 THER/PROPH/DIAG INJ IV PUSH: CPT

## 2023-05-12 PROCEDURE — 99285 EMERGENCY DEPT VISIT HI MDM: CPT

## 2023-05-12 PROCEDURE — 80053 COMPREHEN METABOLIC PANEL: CPT | Performed by: EMERGENCY MEDICINE

## 2023-05-12 PROCEDURE — 96361 HYDRATE IV INFUSION ADD-ON: CPT

## 2023-05-12 PROCEDURE — 74177 CT ABD & PELVIS W/CONTRAST: CPT | Performed by: EMERGENCY MEDICINE

## 2023-05-12 RX ORDER — ONDANSETRON 4 MG/1
4 TABLET, ORALLY DISINTEGRATING ORAL EVERY 8 HOURS PRN
Qty: 15 TABLET | Refills: 0 | Status: SHIPPED | OUTPATIENT
Start: 2023-05-12 | End: 2023-05-17

## 2023-05-12 RX ORDER — DICYCLOMINE HCL 20 MG
20 TABLET ORAL 4 TIMES DAILY PRN
Qty: 40 TABLET | Refills: 0 | Status: SHIPPED | OUTPATIENT
Start: 2023-05-12 | End: 2023-05-22

## 2023-05-12 RX ORDER — ONDANSETRON 2 MG/ML
4 INJECTION INTRAMUSCULAR; INTRAVENOUS ONCE
Status: COMPLETED | OUTPATIENT
Start: 2023-05-12 | End: 2023-05-12

## 2023-05-12 NOTE — ED INITIAL ASSESSMENT (HPI)
Pt reports diarrhea \"every hour\" for the last five days. Pt reports vomiting x7 today, denies fevers. Pt denies recent antibiotic use.

## 2023-05-15 ENCOUNTER — TELEPHONE (OUTPATIENT)
Dept: ENDOCRINOLOGY CLINIC | Facility: CLINIC | Age: 58
End: 2023-05-15

## 2023-05-15 NOTE — TELEPHONE ENCOUNTER
Form was faxed over to [facility] at [phone]. Received Confirmation: Success.  Placed in RN Triage/Brown Folder

## 2023-05-15 NOTE — TELEPHONE ENCOUNTER
Patient came into clinic stating that he followed up with [facility]. States that Ul[facility] will send fax of required forms (unable to locate) Page 3 with signature needed and correct dose on prescriper portion ([id number])     Since unable to locate form, had patient sign page 3 and started another PAP.  Will fill out and send out to [employer]

## 2023-05-15 NOTE — TELEPHONE ENCOUNTER
Dr. Blue Linen out required forms per patient, placed forms in folder for signature and date    Please fax to [place] [phone] upon completion, fax sheet included

## 2023-06-06 ENCOUNTER — LAB ENCOUNTER (OUTPATIENT)
Dept: LAB | Age: 58
End: 2023-06-06
Attending: INTERNAL MEDICINE
Payer: MEDICARE

## 2023-06-06 DIAGNOSIS — Z94.0 KIDNEY TRANSPLANTED: ICD-10-CM

## 2023-06-06 DIAGNOSIS — B25.9 CMV INFECTION (HCC): ICD-10-CM

## 2023-06-06 DIAGNOSIS — N39.0 URINARY TRACT INFECTION: ICD-10-CM

## 2023-06-06 DIAGNOSIS — E78.5 HYPERLIPIDEMIA: ICD-10-CM

## 2023-06-06 DIAGNOSIS — E83.42 HYPOMAGNESEMIA: ICD-10-CM

## 2023-06-06 DIAGNOSIS — R73.9 HYPERGLYCEMIA: ICD-10-CM

## 2023-06-06 DIAGNOSIS — D70.9 NEUTROPENIA (HCC): ICD-10-CM

## 2023-06-06 DIAGNOSIS — E55.9 VITAMIN D DEFICIENCY: ICD-10-CM

## 2023-06-06 DIAGNOSIS — R78.81 BACTEREMIA: ICD-10-CM

## 2023-06-06 DIAGNOSIS — R80.9 PROTEINURIA: ICD-10-CM

## 2023-06-06 DIAGNOSIS — Z51.81 ENCOUNTER FOR THERAPEUTIC DRUG MONITORING: ICD-10-CM

## 2023-06-06 DIAGNOSIS — B34.8 BK VIREMIA: ICD-10-CM

## 2023-06-06 LAB
ANION GAP SERPL CALC-SCNC: 5 MMOL/L (ref 0–18)
BASOPHILS # BLD AUTO: 0.02 X10(3) UL (ref 0–0.2)
BASOPHILS NFR BLD AUTO: 0.3 %
BILIRUB UR QL STRIP.AUTO: NEGATIVE
BUN BLD-MCNC: 15 MG/DL (ref 7–18)
CALCIUM BLD-MCNC: 8.8 MG/DL (ref 8.5–10.1)
CHLORIDE SERPL-SCNC: 112 MMOL/L (ref 98–112)
CHOLEST SERPL-MCNC: 96 MG/DL (ref ?–200)
CO2 SERPL-SCNC: 22 MMOL/L (ref 21–32)
COLOR UR AUTO: YELLOW
CREAT BLD-MCNC: 1.01 MG/DL
CREAT UR-SCNC: 294 MG/DL
CREAT UR-SCNC: 294 MG/DL
EOSINOPHIL # BLD AUTO: 0.07 X10(3) UL (ref 0–0.7)
EOSINOPHIL NFR BLD AUTO: 1.2 %
ERYTHROCYTE [DISTWIDTH] IN BLOOD BY AUTOMATED COUNT: 13.5 %
EST. AVERAGE GLUCOSE BLD GHB EST-MCNC: 151 MG/DL (ref 68–126)
FASTING PATIENT LIPID ANSWER: YES
FASTING STATUS PATIENT QL REPORTED: YES
GFR SERPLBLD BASED ON 1.73 SQ M-ARVRAT: 87 ML/MIN/1.73M2 (ref 60–?)
GLUCOSE BLD-MCNC: 146 MG/DL (ref 70–99)
GLUCOSE UR STRIP.AUTO-MCNC: NEGATIVE MG/DL
HBA1C MFR BLD: 6.9 % (ref ?–5.7)
HCT VFR BLD AUTO: 39.2 %
HDLC SERPL-MCNC: 55 MG/DL (ref 40–59)
HGB BLD-MCNC: 13 G/DL
IMM GRANULOCYTES # BLD AUTO: 0.02 X10(3) UL (ref 0–1)
IMM GRANULOCYTES NFR BLD: 0.3 %
KETONES UR STRIP.AUTO-MCNC: NEGATIVE MG/DL
LDLC SERPL CALC-MCNC: 21 MG/DL (ref ?–100)
LEUKOCYTE ESTERASE UR QL STRIP.AUTO: NEGATIVE
LYMPHOCYTES # BLD AUTO: 1.54 X10(3) UL (ref 1–4)
LYMPHOCYTES NFR BLD AUTO: 26.7 %
MAGNESIUM SERPL-MCNC: 2.2 MG/DL (ref 1.6–2.6)
MCH RBC QN AUTO: 29 PG (ref 26–34)
MCHC RBC AUTO-ENTMCNC: 33.2 G/DL (ref 31–37)
MCV RBC AUTO: 87.3 FL
MICROALBUMIN UR-MCNC: 25.5 MG/DL
MICROALBUMIN/CREAT 24H UR-RTO: 86.7 UG/MG (ref ?–30)
MONOCYTES # BLD AUTO: 0.43 X10(3) UL (ref 0.1–1)
MONOCYTES NFR BLD AUTO: 7.5 %
NEUTROPHILS # BLD AUTO: 3.69 X10 (3) UL (ref 1.5–7.7)
NEUTROPHILS # BLD AUTO: 3.69 X10(3) UL (ref 1.5–7.7)
NEUTROPHILS NFR BLD AUTO: 64 %
NITRITE UR QL STRIP.AUTO: NEGATIVE
NONHDLC SERPL-MCNC: 41 MG/DL (ref ?–130)
OSMOLALITY SERPL CALC.SUM OF ELEC: 291 MOSM/KG (ref 275–295)
PH UR STRIP.AUTO: 5 [PH] (ref 5–8)
PHOSPHATE SERPL-MCNC: 3.7 MG/DL (ref 2.5–4.9)
PLATELET # BLD AUTO: 179 10(3)UL (ref 150–450)
POTASSIUM SERPL-SCNC: 4.1 MMOL/L (ref 3.5–5.1)
PROT UR STRIP.AUTO-MCNC: 100 MG/DL
PROT UR-MCNC: 70.8 MG/DL
PTH-INTACT SERPL-MCNC: 43.6 PG/ML (ref 18.5–88)
RBC # BLD AUTO: 4.49 X10(6)UL
RBC UR QL AUTO: NEGATIVE
SODIUM SERPL-SCNC: 139 MMOL/L (ref 136–145)
SP GR UR STRIP.AUTO: 1.03 (ref 1–1.03)
TRIGL SERPL-MCNC: 113 MG/DL (ref 30–149)
UROBILINOGEN UR STRIP.AUTO-MCNC: <2 MG/DL
VIT D+METAB SERPL-MCNC: 36.2 NG/ML (ref 30–100)
VLDLC SERPL CALC-MCNC: 15 MG/DL (ref 0–30)
WBC # BLD AUTO: 5.8 X10(3) UL (ref 4–11)

## 2023-06-06 PROCEDURE — 80197 ASSAY OF TACROLIMUS: CPT

## 2023-06-06 PROCEDURE — 84156 ASSAY OF PROTEIN URINE: CPT

## 2023-06-06 PROCEDURE — 85025 COMPLETE CBC W/AUTO DIFF WBC: CPT

## 2023-06-06 PROCEDURE — 36415 COLL VENOUS BLD VENIPUNCTURE: CPT

## 2023-06-06 PROCEDURE — 87086 URINE CULTURE/COLONY COUNT: CPT

## 2023-06-06 PROCEDURE — 82570 ASSAY OF URINE CREATININE: CPT

## 2023-06-06 PROCEDURE — 82043 UR ALBUMIN QUANTITATIVE: CPT

## 2023-06-06 PROCEDURE — 83735 ASSAY OF MAGNESIUM: CPT

## 2023-06-06 PROCEDURE — 84100 ASSAY OF PHOSPHORUS: CPT

## 2023-06-06 PROCEDURE — 80048 BASIC METABOLIC PNL TOTAL CA: CPT

## 2023-06-06 PROCEDURE — 81001 URINALYSIS AUTO W/SCOPE: CPT

## 2023-06-08 LAB — TACROLIMUS LVL: 5.1 NG/ML

## 2023-06-19 ENCOUNTER — OFFICE VISIT (OUTPATIENT)
Dept: ENDOCRINOLOGY CLINIC | Facility: CLINIC | Age: 58
End: 2023-06-19

## 2023-06-19 VITALS
HEIGHT: 61 IN | WEIGHT: 176 LBS | HEART RATE: 86 BPM | DIASTOLIC BLOOD PRESSURE: 79 MMHG | BODY MASS INDEX: 33.23 KG/M2 | SYSTOLIC BLOOD PRESSURE: 120 MMHG

## 2023-06-19 DIAGNOSIS — E78.5 DYSLIPIDEMIA: ICD-10-CM

## 2023-06-19 DIAGNOSIS — Z79.4 TYPE 2 DIABETES MELLITUS WITH OTHER SPECIFIED COMPLICATION, WITH LONG-TERM CURRENT USE OF INSULIN (HCC): Primary | ICD-10-CM

## 2023-06-19 DIAGNOSIS — E11.69 TYPE 2 DIABETES MELLITUS WITH OTHER SPECIFIED COMPLICATION, WITH LONG-TERM CURRENT USE OF INSULIN (HCC): Primary | ICD-10-CM

## 2023-06-19 LAB
GLUCOSE BLOOD: 144
TEST STRIP LOT #: NORMAL NUMERIC

## 2023-06-22 ENCOUNTER — OFFICE VISIT (OUTPATIENT)
Facility: CLINIC | Age: 58
End: 2023-06-22

## 2023-06-22 VITALS
HEART RATE: 72 BPM | SYSTOLIC BLOOD PRESSURE: 122 MMHG | DIASTOLIC BLOOD PRESSURE: 62 MMHG | HEIGHT: 61 IN | WEIGHT: 176 LBS | BODY MASS INDEX: 33.23 KG/M2

## 2023-06-22 DIAGNOSIS — E11.21 DIABETIC GLOMERULOPATHY (HCC): ICD-10-CM

## 2023-06-22 DIAGNOSIS — I10 HYPERTENSION, UNSPECIFIED TYPE: Primary | ICD-10-CM

## 2023-06-22 DIAGNOSIS — Z94.0 KIDNEY TRANSPLANT RECIPIENT: ICD-10-CM

## 2023-06-22 PROCEDURE — 99214 OFFICE O/P EST MOD 30 MIN: CPT | Performed by: INTERNAL MEDICINE

## 2023-06-22 PROCEDURE — 3078F DIAST BP <80 MM HG: CPT | Performed by: INTERNAL MEDICINE

## 2023-06-22 PROCEDURE — 3074F SYST BP LT 130 MM HG: CPT | Performed by: INTERNAL MEDICINE

## 2023-06-22 PROCEDURE — 3008F BODY MASS INDEX DOCD: CPT | Performed by: INTERNAL MEDICINE

## 2023-06-23 ENCOUNTER — TELEPHONE (OUTPATIENT)
Dept: NEPHROLOGY | Facility: CLINIC | Age: 58
End: 2023-06-23

## 2023-09-19 ENCOUNTER — TELEPHONE (OUTPATIENT)
Dept: ENDOCRINOLOGY CLINIC | Facility: CLINIC | Age: 58
End: 2023-09-19

## 2023-09-19 NOTE — TELEPHONE ENCOUNTER
Received fax from [facility] . Completed attached PAP form for Ozempic 1mg. Placed in providers folder.

## 2023-10-06 ENCOUNTER — LAB ENCOUNTER (OUTPATIENT)
Dept: LAB | Age: 58
End: 2023-10-06
Attending: INTERNAL MEDICINE
Payer: MEDICARE

## 2023-10-06 ENCOUNTER — TELEPHONE (OUTPATIENT)
Dept: NEPHROLOGY | Facility: CLINIC | Age: 58
End: 2023-10-06

## 2023-10-06 DIAGNOSIS — R73.9 HYPERGLYCEMIA: ICD-10-CM

## 2023-10-06 DIAGNOSIS — R80.9 PROTEINURIA: ICD-10-CM

## 2023-10-06 DIAGNOSIS — E83.42 HYPOMAGNESEMIA: ICD-10-CM

## 2023-10-06 DIAGNOSIS — Z94.0 KIDNEY REPLACED BY TRANSPLANT: ICD-10-CM

## 2023-10-06 DIAGNOSIS — N39.0 URINARY TRACT INFECTION, SITE NOT SPECIFIED: ICD-10-CM

## 2023-10-06 DIAGNOSIS — B27.90 EPSTEIN BARR INFECTION: ICD-10-CM

## 2023-10-06 DIAGNOSIS — E55.9 VITAMIN D DEFICIENCY: ICD-10-CM

## 2023-10-06 DIAGNOSIS — A41.89 VIRAL SEPSIS: ICD-10-CM

## 2023-10-06 DIAGNOSIS — E78.5 HYPERLIPIDEMIA: Primary | ICD-10-CM

## 2023-10-06 DIAGNOSIS — E11.9 DIABETES MELLITUS (HCC): ICD-10-CM

## 2023-10-06 DIAGNOSIS — D70.8 OTHER NEUTROPENIA (HCC): ICD-10-CM

## 2023-10-06 DIAGNOSIS — Z94.0 KIDNEY TRANSPLANT RECIPIENT: ICD-10-CM

## 2023-10-06 DIAGNOSIS — B25.9 CMV INFECTION (HCC): ICD-10-CM

## 2023-10-06 DIAGNOSIS — B97.89 VIRAL SEPSIS: ICD-10-CM

## 2023-10-06 DIAGNOSIS — R78.81 BACTEREMIA: ICD-10-CM

## 2023-10-06 DIAGNOSIS — Z51.81 ENCOUNTER FOR THERAPEUTIC DRUG MONITORING: ICD-10-CM

## 2023-10-06 LAB
ALBUMIN SERPL-MCNC: 3.7 G/DL (ref 3.4–5)
ANION GAP SERPL CALC-SCNC: 4 MMOL/L (ref 0–18)
ANION GAP SERPL CALC-SCNC: 5 MMOL/L (ref 0–18)
BASOPHILS # BLD AUTO: 0.02 X10(3) UL (ref 0–0.2)
BASOPHILS NFR BLD AUTO: 0.4 %
BILIRUB UR QL STRIP.AUTO: NEGATIVE
BUN BLD-MCNC: 15 MG/DL (ref 7–18)
BUN BLD-MCNC: 16 MG/DL (ref 7–18)
CALCIUM BLD-MCNC: 8.7 MG/DL (ref 8.5–10.1)
CALCIUM BLD-MCNC: 8.8 MG/DL (ref 8.5–10.1)
CHLORIDE SERPL-SCNC: 110 MMOL/L (ref 98–112)
CHLORIDE SERPL-SCNC: 110 MMOL/L (ref 98–112)
CLARITY UR REFRACT.AUTO: CLEAR
CO2 SERPL-SCNC: 24 MMOL/L (ref 21–32)
CO2 SERPL-SCNC: 24 MMOL/L (ref 21–32)
COLOR UR AUTO: YELLOW
CREAT BLD-MCNC: 1.16 MG/DL
CREAT BLD-MCNC: 1.22 MG/DL
CREAT UR-SCNC: 296 MG/DL
CREAT UR-SCNC: 296 MG/DL
EGFRCR SERPLBLD CKD-EPI 2021: 70 ML/MIN/1.73M2 (ref 60–?)
EGFRCR SERPLBLD CKD-EPI 2021: 74 ML/MIN/1.73M2 (ref 60–?)
EOSINOPHIL # BLD AUTO: 0.04 X10(3) UL (ref 0–0.7)
EOSINOPHIL NFR BLD AUTO: 0.7 %
ERYTHROCYTE [DISTWIDTH] IN BLOOD BY AUTOMATED COUNT: 13.1 %
FASTING STATUS PATIENT QL REPORTED: YES
GLUCOSE BLD-MCNC: 128 MG/DL (ref 70–99)
GLUCOSE BLD-MCNC: 129 MG/DL (ref 70–99)
GLUCOSE UR STRIP.AUTO-MCNC: NORMAL MG/DL
HCT VFR BLD AUTO: 36.7 %
HGB BLD-MCNC: 12.6 G/DL
IMM GRANULOCYTES # BLD AUTO: 0.02 X10(3) UL (ref 0–1)
IMM GRANULOCYTES NFR BLD: 0.4 %
KETONES UR STRIP.AUTO-MCNC: NEGATIVE MG/DL
LEUKOCYTE ESTERASE UR QL STRIP.AUTO: NEGATIVE
LYMPHOCYTES # BLD AUTO: 1.27 X10(3) UL (ref 1–4)
LYMPHOCYTES NFR BLD AUTO: 23.2 %
MAGNESIUM SERPL-MCNC: 2.3 MG/DL (ref 1.6–2.6)
MCH RBC QN AUTO: 29.8 PG (ref 26–34)
MCHC RBC AUTO-ENTMCNC: 34.3 G/DL (ref 31–37)
MCV RBC AUTO: 86.8 FL
MICROALBUMIN UR-MCNC: 11.7 MG/DL
MICROALBUMIN/CREAT 24H UR-RTO: 39.5 UG/MG (ref ?–30)
MONOCYTES # BLD AUTO: 0.36 X10(3) UL (ref 0.1–1)
MONOCYTES NFR BLD AUTO: 6.6 %
NEUTROPHILS # BLD AUTO: 3.77 X10 (3) UL (ref 1.5–7.7)
NEUTROPHILS # BLD AUTO: 3.77 X10(3) UL (ref 1.5–7.7)
NEUTROPHILS NFR BLD AUTO: 68.7 %
NITRITE UR QL STRIP.AUTO: NEGATIVE
OSMOLALITY SERPL CALC.SUM OF ELEC: 289 MOSM/KG (ref 275–295)
OSMOLALITY SERPL CALC.SUM OF ELEC: 291 MOSM/KG (ref 275–295)
PH UR STRIP.AUTO: 6.5 [PH] (ref 5–8)
PHOSPHATE SERPL-MCNC: 4.3 MG/DL (ref 2.5–4.9)
PHOSPHATE SERPL-MCNC: 4.5 MG/DL (ref 2.5–4.9)
PLATELET # BLD AUTO: 166 10(3)UL (ref 150–450)
POTASSIUM SERPL-SCNC: 4.1 MMOL/L (ref 3.5–5.1)
POTASSIUM SERPL-SCNC: 4.2 MMOL/L (ref 3.5–5.1)
PROT UR STRIP.AUTO-MCNC: 30 MG/DL
PROT UR-MCNC: 41.6 MG/DL
PTH-INTACT SERPL-MCNC: 68.5 PG/ML (ref 18.5–88)
RBC # BLD AUTO: 4.23 X10(6)UL
RBC UR QL AUTO: NEGATIVE
SODIUM SERPL-SCNC: 138 MMOL/L (ref 136–145)
SODIUM SERPL-SCNC: 139 MMOL/L (ref 136–145)
SP GR UR STRIP.AUTO: 1.03 (ref 1–1.03)
UROBILINOGEN UR STRIP.AUTO-MCNC: NORMAL MG/DL
WBC # BLD AUTO: 5.5 X10(3) UL (ref 4–11)

## 2023-10-06 PROCEDURE — 80069 RENAL FUNCTION PANEL: CPT

## 2023-10-06 PROCEDURE — 82043 UR ALBUMIN QUANTITATIVE: CPT

## 2023-10-06 PROCEDURE — 84156 ASSAY OF PROTEIN URINE: CPT

## 2023-10-06 PROCEDURE — 82570 ASSAY OF URINE CREATININE: CPT

## 2023-10-06 PROCEDURE — 83036 HEMOGLOBIN GLYCOSYLATED A1C: CPT

## 2023-10-06 PROCEDURE — 85025 COMPLETE CBC W/AUTO DIFF WBC: CPT | Performed by: INTERNAL MEDICINE

## 2023-10-06 PROCEDURE — 83970 ASSAY OF PARATHORMONE: CPT | Performed by: INTERNAL MEDICINE

## 2023-10-06 PROCEDURE — 83735 ASSAY OF MAGNESIUM: CPT

## 2023-10-06 PROCEDURE — 80197 ASSAY OF TACROLIMUS: CPT

## 2023-10-06 PROCEDURE — 87798 DETECT AGENT NOS DNA AMP: CPT

## 2023-10-06 PROCEDURE — 80048 BASIC METABOLIC PNL TOTAL CA: CPT

## 2023-10-06 PROCEDURE — 36415 COLL VENOUS BLD VENIPUNCTURE: CPT | Performed by: INTERNAL MEDICINE

## 2023-10-06 PROCEDURE — 81001 URINALYSIS AUTO W/SCOPE: CPT | Performed by: INTERNAL MEDICINE

## 2023-10-06 PROCEDURE — 87086 URINE CULTURE/COLONY COUNT: CPT

## 2023-10-06 PROCEDURE — 84100 ASSAY OF PHOSPHORUS: CPT

## 2023-10-06 PROCEDURE — 3060F POS MICROALBUMINURIA REV: CPT | Performed by: INTERNAL MEDICINE

## 2023-10-06 NOTE — TELEPHONE ENCOUNTER
----- Message from Swathi Larson MD sent at 10/6/2023  4:04 PM CDT -----  Please let the patient know that I reviewed his labs. His kidney function is great!   He needs a follow-up appointment in June 2024

## 2023-10-07 LAB
EST. AVERAGE GLUCOSE BLD GHB EST-MCNC: 146 MG/DL (ref 68–126)
HBA1C MFR BLD: 6.7 % (ref ?–5.7)

## 2023-10-09 LAB
EBV DNA, QUANT PCR, PLASMA: NEGATIVE IU/ML
TACROLIMUS LVL: 6.6 NG/ML

## 2023-11-16 ENCOUNTER — TELEPHONE (OUTPATIENT)
Dept: ENDOCRINOLOGY CLINIC | Facility: CLINIC | Age: 58
End: 2023-11-16

## 2023-11-16 ENCOUNTER — MED REC SCAN ONLY (OUTPATIENT)
Dept: ENDOCRINOLOGY CLINIC | Facility: CLINIC | Age: 58
End: 2023-11-16

## 2023-11-16 NOTE — TELEPHONE ENCOUNTER
Pt is calling to have new pt assistance for Ozempic started. He states that the office printed forms for him previously and submitted. His current Pt assistance application will be expiring. Pt wants to stop in today to get the forms signed.   Please call

## 2023-11-16 NOTE — TELEPHONE ENCOUNTER
Called patient, no answer, left VM  Prescriber portion filled out (needing MD signature) documents placed on RN desk

## 2023-11-16 NOTE — TELEPHONE ENCOUNTER
Patient stopped by the clinic to fill out his portion of the patient assistance program for ozempic. Placed the form for provider's signature. Attached to the application is a copy of patient's proof of income.     Future Appointments   Date Time Provider Junior Annette   12/26/2023 10:45 AM Dean Farah MD [facility]

## 2023-11-21 NOTE — TELEPHONE ENCOUNTER
Received signed forms from provider folder, faxing forms to [phone].   Confirmation received success

## 2023-11-29 ENCOUNTER — LAB ENCOUNTER (OUTPATIENT)
Dept: LAB | Age: 58
End: 2023-11-29
Payer: MEDICARE

## 2023-11-29 DIAGNOSIS — E11.8 TYPE 2 DIABETES MELLITUS WITH COMPLICATION, WITHOUT LONG-TERM CURRENT USE OF INSULIN (HCC): ICD-10-CM

## 2023-11-29 DIAGNOSIS — Z12.5 SCREENING PSA (PROSTATE SPECIFIC ANTIGEN): ICD-10-CM

## 2023-11-29 DIAGNOSIS — E78.00 PURE HYPERCHOLESTEROLEMIA: ICD-10-CM

## 2023-11-29 DIAGNOSIS — I10 ESSENTIAL HYPERTENSION, BENIGN: ICD-10-CM

## 2023-11-29 LAB
ALBUMIN SERPL-MCNC: 4.6 G/DL (ref 3.2–4.8)
ALBUMIN/GLOB SERPL: 1.5 {RATIO} (ref 1–2)
ALP LIVER SERPL-CCNC: 83 U/L
ALT SERPL-CCNC: 17 U/L
ANION GAP SERPL CALC-SCNC: 7 MMOL/L (ref 0–18)
AST SERPL-CCNC: 19 U/L (ref ?–34)
BASOPHILS # BLD AUTO: 0.04 X10(3) UL (ref 0–0.2)
BASOPHILS NFR BLD AUTO: 0.4 %
BILIRUB SERPL-MCNC: 0.9 MG/DL (ref 0.3–1.2)
BUN BLD-MCNC: 9 MG/DL (ref 9–23)
BUN/CREAT SERPL: 8.2 (ref 10–20)
CALCIUM BLD-MCNC: 9.4 MG/DL (ref 8.7–10.4)
CHLORIDE SERPL-SCNC: 106 MMOL/L (ref 98–112)
CHOLEST SERPL-MCNC: 99 MG/DL (ref ?–200)
CK SERPL-CCNC: 90 U/L
CO2 SERPL-SCNC: 26 MMOL/L (ref 21–32)
COMPLEXED PSA SERPL-MCNC: 0.37 NG/ML (ref ?–4)
CREAT BLD-MCNC: 1.1 MG/DL
DEPRECATED RDW RBC AUTO: 41.9 FL (ref 35.1–46.3)
EGFRCR SERPLBLD CKD-EPI 2021: 79 ML/MIN/1.73M2 (ref 60–?)
EOSINOPHIL # BLD AUTO: 0.06 X10(3) UL (ref 0–0.7)
EOSINOPHIL NFR BLD AUTO: 0.6 %
ERYTHROCYTE [DISTWIDTH] IN BLOOD BY AUTOMATED COUNT: 12.9 % (ref 11–15)
EST. AVERAGE GLUCOSE BLD GHB EST-MCNC: 143 MG/DL (ref 68–126)
FASTING PATIENT LIPID ANSWER: YES
FASTING STATUS PATIENT QL REPORTED: YES
GLOBULIN PLAS-MCNC: 3 G/DL (ref 2.8–4.4)
GLUCOSE BLD-MCNC: 94 MG/DL (ref 70–99)
HBA1C MFR BLD: 6.6 % (ref ?–5.7)
HCT VFR BLD AUTO: 42.9 %
HDLC SERPL-MCNC: 52 MG/DL (ref 40–59)
HGB BLD-MCNC: 14.1 G/DL
IMM GRANULOCYTES # BLD AUTO: 0.02 X10(3) UL (ref 0–1)
IMM GRANULOCYTES NFR BLD: 0.2 %
LDLC SERPL CALC-MCNC: 27 MG/DL (ref ?–100)
LYMPHOCYTES # BLD AUTO: 1.89 X10(3) UL (ref 1–4)
LYMPHOCYTES NFR BLD AUTO: 20.4 %
MCH RBC QN AUTO: 29.3 PG (ref 26–34)
MCHC RBC AUTO-ENTMCNC: 32.9 G/DL (ref 31–37)
MCV RBC AUTO: 89 FL
MONOCYTES # BLD AUTO: 0.44 X10(3) UL (ref 0.1–1)
MONOCYTES NFR BLD AUTO: 4.8 %
NEUTROPHILS # BLD AUTO: 6.8 X10 (3) UL (ref 1.5–7.7)
NEUTROPHILS # BLD AUTO: 6.8 X10(3) UL (ref 1.5–7.7)
NEUTROPHILS NFR BLD AUTO: 73.6 %
NONHDLC SERPL-MCNC: 47 MG/DL (ref ?–130)
OSMOLALITY SERPL CALC.SUM OF ELEC: 286 MOSM/KG (ref 275–295)
PLATELET # BLD AUTO: 206 10(3)UL (ref 150–450)
POTASSIUM SERPL-SCNC: 4.3 MMOL/L (ref 3.5–5.1)
PROT SERPL-MCNC: 7.6 G/DL (ref 5.7–8.2)
RBC # BLD AUTO: 4.82 X10(6)UL
SODIUM SERPL-SCNC: 139 MMOL/L (ref 136–145)
TRIGL SERPL-MCNC: 110 MG/DL (ref 30–149)
TSI SER-ACNC: 2.33 MIU/ML (ref 0.55–4.78)
VLDLC SERPL CALC-MCNC: 15 MG/DL (ref 0–30)
WBC # BLD AUTO: 9.3 X10(3) UL (ref 4–11)

## 2023-11-29 PROCEDURE — 82550 ASSAY OF CK (CPK): CPT

## 2023-11-29 PROCEDURE — 36415 COLL VENOUS BLD VENIPUNCTURE: CPT

## 2023-11-29 PROCEDURE — 82043 UR ALBUMIN QUANTITATIVE: CPT

## 2023-11-29 PROCEDURE — 80053 COMPREHEN METABOLIC PANEL: CPT

## 2023-11-29 PROCEDURE — 80061 LIPID PANEL: CPT

## 2023-11-29 PROCEDURE — 82570 ASSAY OF URINE CREATININE: CPT

## 2023-11-29 PROCEDURE — 84443 ASSAY THYROID STIM HORMONE: CPT

## 2023-11-29 PROCEDURE — 83036 HEMOGLOBIN GLYCOSYLATED A1C: CPT

## 2023-11-29 PROCEDURE — 3061F NEG MICROALBUMINURIA REV: CPT | Performed by: INTERNAL MEDICINE

## 2023-11-29 PROCEDURE — 3044F HG A1C LEVEL LT 7.0%: CPT | Performed by: INTERNAL MEDICINE

## 2023-11-29 PROCEDURE — 85025 COMPLETE CBC W/AUTO DIFF WBC: CPT

## 2023-12-26 ENCOUNTER — OFFICE VISIT (OUTPATIENT)
Dept: ENDOCRINOLOGY CLINIC | Facility: CLINIC | Age: 58
End: 2023-12-26

## 2023-12-26 ENCOUNTER — PATIENT MESSAGE (OUTPATIENT)
Dept: ENDOCRINOLOGY CLINIC | Facility: CLINIC | Age: 58
End: 2023-12-26

## 2023-12-26 VITALS
DIASTOLIC BLOOD PRESSURE: 75 MMHG | HEIGHT: 60.98 IN | SYSTOLIC BLOOD PRESSURE: 119 MMHG | BODY MASS INDEX: 34.17 KG/M2 | HEART RATE: 97 BPM | WEIGHT: 181 LBS

## 2023-12-26 DIAGNOSIS — E11.69 TYPE 2 DIABETES MELLITUS WITH OTHER SPECIFIED COMPLICATION, WITH LONG-TERM CURRENT USE OF INSULIN (HCC): Primary | ICD-10-CM

## 2023-12-26 DIAGNOSIS — E78.5 DYSLIPIDEMIA: ICD-10-CM

## 2023-12-26 DIAGNOSIS — Z79.4 TYPE 2 DIABETES MELLITUS WITH OTHER SPECIFIED COMPLICATION, WITH LONG-TERM CURRENT USE OF INSULIN (HCC): Primary | ICD-10-CM

## 2023-12-26 LAB
GLUCOSE BLOOD: 166
TEST STRIP LOT #: NORMAL NUMERIC

## 2023-12-26 PROCEDURE — 82947 ASSAY GLUCOSE BLOOD QUANT: CPT | Performed by: INTERNAL MEDICINE

## 2023-12-26 PROCEDURE — 3008F BODY MASS INDEX DOCD: CPT | Performed by: INTERNAL MEDICINE

## 2023-12-26 PROCEDURE — 3074F SYST BP LT 130 MM HG: CPT | Performed by: INTERNAL MEDICINE

## 2023-12-26 PROCEDURE — 95251 CONT GLUC MNTR ANALYSIS I&R: CPT | Performed by: INTERNAL MEDICINE

## 2023-12-26 PROCEDURE — 99214 OFFICE O/P EST MOD 30 MIN: CPT | Performed by: INTERNAL MEDICINE

## 2023-12-26 PROCEDURE — 3078F DIAST BP <80 MM HG: CPT | Performed by: INTERNAL MEDICINE

## 2024-02-08 ENCOUNTER — APPOINTMENT (OUTPATIENT)
Dept: URBAN - METROPOLITAN AREA CLINIC 244 | Age: 59
Setting detail: DERMATOLOGY
End: 2024-02-08

## 2024-02-08 DIAGNOSIS — L81.4 OTHER MELANIN HYPERPIGMENTATION: ICD-10-CM

## 2024-02-08 DIAGNOSIS — D22 MELANOCYTIC NEVI: ICD-10-CM

## 2024-02-08 DIAGNOSIS — L82.1 OTHER SEBORRHEIC KERATOSIS: ICD-10-CM

## 2024-02-08 PROBLEM — D22.5 MELANOCYTIC NEVI OF TRUNK: Status: ACTIVE | Noted: 2024-02-08

## 2024-02-08 PROCEDURE — OTHER COUNSELING: OTHER

## 2024-02-08 PROCEDURE — 99203 OFFICE O/P NEW LOW 30 MIN: CPT

## 2024-02-08 ASSESSMENT — LOCATION SIMPLE DESCRIPTION DERM: LOCATION SIMPLE: ABDOMEN

## 2024-02-08 ASSESSMENT — LOCATION DETAILED DESCRIPTION DERM: LOCATION DETAILED: PERIUMBILICAL SKIN

## 2024-02-08 ASSESSMENT — LOCATION ZONE DERM: LOCATION ZONE: TRUNK

## 2024-02-08 NOTE — HPI: FULL BODY SKIN EXAMINATION
What Type Of Note Output Would You Prefer (Optional)?: Bullet Format
What Is The Reason For Today's Visit?: Full Body Skin Examination with No Concerns
Additional History: Patient had kidney transplant 2 years ago and started meds that can cause sun sensitivity.

## 2024-02-09 ENCOUNTER — TELEPHONE (OUTPATIENT)
Dept: ENDOCRINOLOGY CLINIC | Facility: CLINIC | Age: 59
End: 2024-02-09

## 2024-02-12 ENCOUNTER — LAB ENCOUNTER (OUTPATIENT)
Dept: LAB | Age: 59
End: 2024-02-12
Attending: INTERNAL MEDICINE
Payer: MEDICARE

## 2024-02-12 DIAGNOSIS — Z94.0 KIDNEY REPLACED BY TRANSPLANT (HCC): ICD-10-CM

## 2024-02-12 DIAGNOSIS — B27.90 EPSTEIN BARR INFECTION: ICD-10-CM

## 2024-02-12 DIAGNOSIS — B25.9 CMV INFECTION (HCC): ICD-10-CM

## 2024-02-12 DIAGNOSIS — Z94.0 KIDNEY TRANSPLANT RECIPIENT: ICD-10-CM

## 2024-02-12 DIAGNOSIS — E11.9 DIABETES MELLITUS (HCC): ICD-10-CM

## 2024-02-12 DIAGNOSIS — Z51.81 ENCOUNTER FOR THERAPEUTIC DRUG MONITORING: ICD-10-CM

## 2024-02-12 DIAGNOSIS — D70.8 OTHER NEUTROPENIA (HCC): ICD-10-CM

## 2024-02-12 DIAGNOSIS — E55.9 VITAMIN D DEFICIENCY: ICD-10-CM

## 2024-02-12 DIAGNOSIS — N39.0 URINARY TRACT INFECTION, SITE NOT SPECIFIED: ICD-10-CM

## 2024-02-12 DIAGNOSIS — B97.89 VIRAL SEPSIS (HCC): ICD-10-CM

## 2024-02-12 DIAGNOSIS — A41.89 VIRAL SEPSIS (HCC): ICD-10-CM

## 2024-02-12 DIAGNOSIS — E78.5 HYPERLIPIDEMIA: ICD-10-CM

## 2024-02-12 DIAGNOSIS — R80.9 PROTEINURIA: ICD-10-CM

## 2024-02-12 DIAGNOSIS — E83.42 HYPOMAGNESEMIA: ICD-10-CM

## 2024-02-12 DIAGNOSIS — R78.81 BACTEREMIA: ICD-10-CM

## 2024-02-12 DIAGNOSIS — R73.9 HYPERGLYCEMIA: ICD-10-CM

## 2024-02-12 LAB
ANION GAP SERPL CALC-SCNC: 5 MMOL/L (ref 0–18)
BASOPHILS # BLD AUTO: 0.04 X10(3) UL (ref 0–0.2)
BASOPHILS NFR BLD AUTO: 0.5 %
BUN BLD-MCNC: 14 MG/DL (ref 9–23)
CALCIUM BLD-MCNC: 8.9 MG/DL (ref 8.5–10.1)
CHLORIDE SERPL-SCNC: 108 MMOL/L (ref 98–112)
CO2 SERPL-SCNC: 24 MMOL/L (ref 21–32)
CREAT BLD-MCNC: 1.15 MG/DL
CREAT UR-SCNC: 269 MG/DL
CREAT UR-SCNC: 269 MG/DL
EGFRCR SERPLBLD CKD-EPI 2021: 74 ML/MIN/1.73M2 (ref 60–?)
EOSINOPHIL # BLD AUTO: 0.08 X10(3) UL (ref 0–0.7)
EOSINOPHIL NFR BLD AUTO: 1.1 %
ERYTHROCYTE [DISTWIDTH] IN BLOOD BY AUTOMATED COUNT: 13 %
EST. AVERAGE GLUCOSE BLD GHB EST-MCNC: 154 MG/DL (ref 68–126)
FASTING STATUS PATIENT QL REPORTED: YES
GLUCOSE BLD-MCNC: 146 MG/DL (ref 70–99)
HBA1C MFR BLD: 7 % (ref ?–5.7)
HCT VFR BLD AUTO: 37.6 %
HGB BLD-MCNC: 12.7 G/DL
IMM GRANULOCYTES # BLD AUTO: 0.02 X10(3) UL (ref 0–1)
IMM GRANULOCYTES NFR BLD: 0.3 %
LYMPHOCYTES # BLD AUTO: 1.55 X10(3) UL (ref 1–4)
LYMPHOCYTES NFR BLD AUTO: 20.8 %
MAGNESIUM SERPL-MCNC: 2 MG/DL (ref 1.6–2.6)
MCH RBC QN AUTO: 29.1 PG (ref 26–34)
MCHC RBC AUTO-ENTMCNC: 33.8 G/DL (ref 31–37)
MCV RBC AUTO: 86.2 FL
MICROALBUMIN UR-MCNC: 14 MG/DL
MICROALBUMIN/CREAT 24H UR-RTO: 52 UG/MG (ref ?–30)
MONOCYTES # BLD AUTO: 0.41 X10(3) UL (ref 0.1–1)
MONOCYTES NFR BLD AUTO: 5.5 %
NEUTROPHILS # BLD AUTO: 5.35 X10 (3) UL (ref 1.5–7.7)
NEUTROPHILS # BLD AUTO: 5.35 X10(3) UL (ref 1.5–7.7)
NEUTROPHILS NFR BLD AUTO: 71.8 %
OSMOLALITY SERPL CALC.SUM OF ELEC: 287 MOSM/KG (ref 275–295)
PHOSPHATE SERPL-MCNC: 4.1 MG/DL (ref 2.5–4.9)
PLATELET # BLD AUTO: 176 10(3)UL (ref 150–450)
POTASSIUM SERPL-SCNC: 4.3 MMOL/L (ref 3.5–5.1)
PROT UR-MCNC: 40.9 MG/DL
RBC # BLD AUTO: 4.36 X10(6)UL
SODIUM SERPL-SCNC: 137 MMOL/L (ref 136–145)
WBC # BLD AUTO: 7.5 X10(3) UL (ref 4–11)

## 2024-02-12 PROCEDURE — 84156 ASSAY OF PROTEIN URINE: CPT

## 2024-02-12 PROCEDURE — 82570 ASSAY OF URINE CREATININE: CPT

## 2024-02-12 PROCEDURE — 80197 ASSAY OF TACROLIMUS: CPT

## 2024-02-12 PROCEDURE — 83735 ASSAY OF MAGNESIUM: CPT

## 2024-02-12 PROCEDURE — 87086 URINE CULTURE/COLONY COUNT: CPT

## 2024-02-12 PROCEDURE — 83036 HEMOGLOBIN GLYCOSYLATED A1C: CPT

## 2024-02-12 PROCEDURE — 85025 COMPLETE CBC W/AUTO DIFF WBC: CPT

## 2024-02-12 PROCEDURE — 36415 COLL VENOUS BLD VENIPUNCTURE: CPT

## 2024-02-12 PROCEDURE — 82043 UR ALBUMIN QUANTITATIVE: CPT

## 2024-02-12 PROCEDURE — 84100 ASSAY OF PHOSPHORUS: CPT

## 2024-02-12 PROCEDURE — 87799 DETECT AGENT NOS DNA QUANT: CPT

## 2024-02-12 PROCEDURE — 80048 BASIC METABOLIC PNL TOTAL CA: CPT

## 2024-02-12 PROCEDURE — 87798 DETECT AGENT NOS DNA AMP: CPT

## 2024-02-13 ENCOUNTER — MED REC SCAN ONLY (OUTPATIENT)
Dept: ENDOCRINOLOGY CLINIC | Facility: CLINIC | Age: 59
End: 2024-02-13

## 2024-02-13 NOTE — TELEPHONE ENCOUNTER
Spoke to [facility]: patient needs new order for libre2   [facility] order for libre2 completed and placed in provider's folder for signature    MC sent updating patient

## 2024-02-13 NOTE — TELEPHONE ENCOUNTER
Signed Libre2 order faxed to [facility] at [phone]  Placed in brown accordion folder    MC sent advising patient

## 2024-02-14 LAB
BK VIRUS DETECT, PL, QN LOG: NOT DETECTED {LOG_IU}/ML
BK VIRUS DETECT, PL, QN: NOT DETECTED [IU]/ML
CMV DNA DETECT, QN LOG: NOT DETECTED {LOG_IU}/ML
CMV DNA DETECT, QN: NOT DETECTED [IU]/ML
EBV DNA, QUANT PCR, PLASMA: NEGATIVE IU/ML

## 2024-02-15 LAB — TACROLIMUS LVL: 4.3 NG/ML

## 2024-03-11 ENCOUNTER — LAB ENCOUNTER (OUTPATIENT)
Dept: LAB | Age: 59
End: 2024-03-11
Attending: INTERNAL MEDICINE
Payer: MEDICARE

## 2024-03-11 DIAGNOSIS — R73.9 HYPERGLYCEMIA: ICD-10-CM

## 2024-03-11 DIAGNOSIS — E55.9 VITAMIN D DEFICIENCY: ICD-10-CM

## 2024-03-11 DIAGNOSIS — B25.9 CMV INFECTION (HCC): ICD-10-CM

## 2024-03-11 DIAGNOSIS — R78.81 BACTEREMIA: ICD-10-CM

## 2024-03-11 DIAGNOSIS — Z51.81 ENCOUNTER FOR THERAPEUTIC DRUG MONITORING: ICD-10-CM

## 2024-03-11 DIAGNOSIS — A41.89 VIRAL SEPSIS (HCC): ICD-10-CM

## 2024-03-11 DIAGNOSIS — Z94.0 KIDNEY TRANSPLANT RECIPIENT (HCC): ICD-10-CM

## 2024-03-11 DIAGNOSIS — N39.0 URINARY TRACT INFECTION, SITE NOT SPECIFIED: ICD-10-CM

## 2024-03-11 DIAGNOSIS — E83.42 HYPOMAGNESEMIA: ICD-10-CM

## 2024-03-11 DIAGNOSIS — B97.89 VIRAL SEPSIS (HCC): ICD-10-CM

## 2024-03-11 DIAGNOSIS — R80.9 PROTEINURIA: ICD-10-CM

## 2024-03-11 DIAGNOSIS — E11.9 DIABETES MELLITUS (HCC): ICD-10-CM

## 2024-03-11 DIAGNOSIS — D70.8 OTHER NEUTROPENIA (HCC): ICD-10-CM

## 2024-03-11 DIAGNOSIS — E78.5 HYPERLIPIDEMIA: ICD-10-CM

## 2024-03-11 DIAGNOSIS — B27.90 EPSTEIN BARR INFECTION: ICD-10-CM

## 2024-03-11 DIAGNOSIS — Z94.0 KIDNEY REPLACED BY TRANSPLANT (HCC): ICD-10-CM

## 2024-03-11 LAB
ANION GAP SERPL CALC-SCNC: 10 MMOL/L (ref 0–18)
BASOPHILS # BLD AUTO: 0.03 X10(3) UL (ref 0–0.2)
BASOPHILS NFR BLD AUTO: 0.4 %
BUN BLD-MCNC: 15 MG/DL (ref 9–23)
CALCIUM BLD-MCNC: 9 MG/DL (ref 8.5–10.1)
CHLORIDE SERPL-SCNC: 105 MMOL/L (ref 98–112)
CO2 SERPL-SCNC: 24 MMOL/L (ref 21–32)
CREAT BLD-MCNC: 1.05 MG/DL
CREAT UR-SCNC: 102 MG/DL
CREAT UR-SCNC: 102 MG/DL
EGFRCR SERPLBLD CKD-EPI 2021: 83 ML/MIN/1.73M2 (ref 60–?)
EOSINOPHIL # BLD AUTO: 0.07 X10(3) UL (ref 0–0.7)
EOSINOPHIL NFR BLD AUTO: 1 %
ERYTHROCYTE [DISTWIDTH] IN BLOOD BY AUTOMATED COUNT: 13.3 %
EST. AVERAGE GLUCOSE BLD GHB EST-MCNC: 166 MG/DL (ref 68–126)
FASTING STATUS PATIENT QL REPORTED: YES
GLUCOSE BLD-MCNC: 134 MG/DL (ref 70–99)
HBA1C MFR BLD: 7.4 % (ref ?–5.7)
HCT VFR BLD AUTO: 37.8 %
HGB BLD-MCNC: 12.8 G/DL
IMM GRANULOCYTES # BLD AUTO: 0.03 X10(3) UL (ref 0–1)
IMM GRANULOCYTES NFR BLD: 0.4 %
LYMPHOCYTES # BLD AUTO: 1.67 X10(3) UL (ref 1–4)
LYMPHOCYTES NFR BLD AUTO: 23.5 %
MAGNESIUM SERPL-MCNC: 2 MG/DL (ref 1.6–2.6)
MCH RBC QN AUTO: 29.6 PG (ref 26–34)
MCHC RBC AUTO-ENTMCNC: 33.9 G/DL (ref 31–37)
MCV RBC AUTO: 87.5 FL
MICROALBUMIN UR-MCNC: 4.82 MG/DL
MICROALBUMIN/CREAT 24H UR-RTO: 47.3 UG/MG (ref ?–30)
MONOCYTES # BLD AUTO: 0.43 X10(3) UL (ref 0.1–1)
MONOCYTES NFR BLD AUTO: 6 %
NEUTROPHILS # BLD AUTO: 4.89 X10 (3) UL (ref 1.5–7.7)
NEUTROPHILS # BLD AUTO: 4.89 X10(3) UL (ref 1.5–7.7)
NEUTROPHILS NFR BLD AUTO: 68.7 %
OSMOLALITY SERPL CALC.SUM OF ELEC: 291 MOSM/KG (ref 275–295)
PHOSPHATE SERPL-MCNC: 4.6 MG/DL (ref 2.5–4.9)
PLATELET # BLD AUTO: 176 10(3)UL (ref 150–450)
POTASSIUM SERPL-SCNC: 4.6 MMOL/L (ref 3.5–5.1)
PROT UR-MCNC: 15.8 MG/DL
RBC # BLD AUTO: 4.32 X10(6)UL
SODIUM SERPL-SCNC: 139 MMOL/L (ref 136–145)
WBC # BLD AUTO: 7.1 X10(3) UL (ref 4–11)

## 2024-03-11 PROCEDURE — 82570 ASSAY OF URINE CREATININE: CPT

## 2024-03-11 PROCEDURE — 87086 URINE CULTURE/COLONY COUNT: CPT

## 2024-03-11 PROCEDURE — 84100 ASSAY OF PHOSPHORUS: CPT

## 2024-03-11 PROCEDURE — 80048 BASIC METABOLIC PNL TOTAL CA: CPT

## 2024-03-11 PROCEDURE — 80197 ASSAY OF TACROLIMUS: CPT

## 2024-03-11 PROCEDURE — 36415 COLL VENOUS BLD VENIPUNCTURE: CPT

## 2024-03-11 PROCEDURE — 85025 COMPLETE CBC W/AUTO DIFF WBC: CPT

## 2024-03-11 PROCEDURE — 84156 ASSAY OF PROTEIN URINE: CPT

## 2024-03-11 PROCEDURE — 87799 DETECT AGENT NOS DNA QUANT: CPT

## 2024-03-11 PROCEDURE — 83735 ASSAY OF MAGNESIUM: CPT

## 2024-03-11 PROCEDURE — 83036 HEMOGLOBIN GLYCOSYLATED A1C: CPT

## 2024-03-11 PROCEDURE — 82043 UR ALBUMIN QUANTITATIVE: CPT

## 2024-03-11 PROCEDURE — 87798 DETECT AGENT NOS DNA AMP: CPT

## 2024-03-14 LAB — TACROLIMUS LVL: 6.3 NG/ML

## 2024-04-01 ENCOUNTER — LAB ENCOUNTER (OUTPATIENT)
Dept: LAB | Age: 59
End: 2024-04-01
Attending: INTERNAL MEDICINE
Payer: MEDICARE

## 2024-04-01 DIAGNOSIS — Z94.0 KIDNEY REPLACED BY TRANSPLANT (HCC): ICD-10-CM

## 2024-04-01 DIAGNOSIS — R78.81 BACTEREMIA: ICD-10-CM

## 2024-04-01 DIAGNOSIS — R80.9 PROTEINURIA: ICD-10-CM

## 2024-04-01 DIAGNOSIS — R73.9 HYPERGLYCEMIA: ICD-10-CM

## 2024-04-01 DIAGNOSIS — E83.42 HYPOMAGNESEMIA: ICD-10-CM

## 2024-04-01 DIAGNOSIS — D70.8 OTHER NEUTROPENIA (HCC): ICD-10-CM

## 2024-04-01 DIAGNOSIS — N39.0 URINARY TRACT INFECTION, SITE NOT SPECIFIED: ICD-10-CM

## 2024-04-01 DIAGNOSIS — Z51.81 ENCOUNTER FOR THERAPEUTIC DRUG MONITORING: ICD-10-CM

## 2024-04-01 DIAGNOSIS — E11.9 DIABETES MELLITUS (HCC): ICD-10-CM

## 2024-04-01 DIAGNOSIS — E78.5 HYPERLIPIDEMIA: ICD-10-CM

## 2024-04-01 DIAGNOSIS — B27.90 EPSTEIN BARR INFECTION: ICD-10-CM

## 2024-04-01 DIAGNOSIS — B25.9 CMV INFECTION (HCC): ICD-10-CM

## 2024-04-01 DIAGNOSIS — Z94.0 KIDNEY TRANSPLANT RECIPIENT (HCC): ICD-10-CM

## 2024-04-01 DIAGNOSIS — A41.89 VIRAL SEPSIS (HCC): ICD-10-CM

## 2024-04-01 DIAGNOSIS — B97.89 VIRAL SEPSIS (HCC): ICD-10-CM

## 2024-04-01 DIAGNOSIS — E55.9 VITAMIN D DEFICIENCY: ICD-10-CM

## 2024-04-01 LAB
ANION GAP SERPL CALC-SCNC: 7 MMOL/L (ref 0–18)
BASOPHILS # BLD AUTO: 0.02 X10(3) UL (ref 0–0.2)
BASOPHILS NFR BLD AUTO: 0.3 %
BUN BLD-MCNC: 14 MG/DL (ref 9–23)
CALCIUM BLD-MCNC: 9 MG/DL (ref 8.5–10.1)
CHLORIDE SERPL-SCNC: 110 MMOL/L (ref 98–112)
CO2 SERPL-SCNC: 26 MMOL/L (ref 21–32)
CREAT BLD-MCNC: 1.13 MG/DL
CREAT UR-SCNC: 205 MG/DL
CREAT UR-SCNC: 205 MG/DL
EGFRCR SERPLBLD CKD-EPI 2021: 76 ML/MIN/1.73M2 (ref 60–?)
EOSINOPHIL # BLD AUTO: 0.06 X10(3) UL (ref 0–0.7)
EOSINOPHIL NFR BLD AUTO: 0.9 %
ERYTHROCYTE [DISTWIDTH] IN BLOOD BY AUTOMATED COUNT: 13 %
EST. AVERAGE GLUCOSE BLD GHB EST-MCNC: 171 MG/DL (ref 68–126)
FASTING STATUS PATIENT QL REPORTED: YES
GLUCOSE BLD-MCNC: 67 MG/DL (ref 70–99)
HBA1C MFR BLD: 7.6 % (ref ?–5.7)
HCT VFR BLD AUTO: 38.4 %
HGB BLD-MCNC: 12.5 G/DL
IMM GRANULOCYTES # BLD AUTO: 0.02 X10(3) UL (ref 0–1)
IMM GRANULOCYTES NFR BLD: 0.3 %
LYMPHOCYTES # BLD AUTO: 1.72 X10(3) UL (ref 1–4)
LYMPHOCYTES NFR BLD AUTO: 25.6 %
MAGNESIUM SERPL-MCNC: 1.9 MG/DL (ref 1.6–2.6)
MCH RBC QN AUTO: 28.5 PG (ref 26–34)
MCHC RBC AUTO-ENTMCNC: 32.6 G/DL (ref 31–37)
MCV RBC AUTO: 87.5 FL
MICROALBUMIN UR-MCNC: 16.9 MG/DL
MICROALBUMIN/CREAT 24H UR-RTO: 82.4 UG/MG (ref ?–30)
MONOCYTES # BLD AUTO: 0.46 X10(3) UL (ref 0.1–1)
MONOCYTES NFR BLD AUTO: 6.8 %
NEUTROPHILS # BLD AUTO: 4.44 X10 (3) UL (ref 1.5–7.7)
NEUTROPHILS # BLD AUTO: 4.44 X10(3) UL (ref 1.5–7.7)
NEUTROPHILS NFR BLD AUTO: 66.1 %
OSMOLALITY SERPL CALC.SUM OF ELEC: 295 MOSM/KG (ref 275–295)
PHOSPHATE SERPL-MCNC: 4.1 MG/DL (ref 2.5–4.9)
PLATELET # BLD AUTO: 180 10(3)UL (ref 150–450)
POTASSIUM SERPL-SCNC: 4.3 MMOL/L (ref 3.5–5.1)
PROT UR-MCNC: 42.7 MG/DL
RBC # BLD AUTO: 4.39 X10(6)UL
SODIUM SERPL-SCNC: 143 MMOL/L (ref 136–145)
WBC # BLD AUTO: 6.7 X10(3) UL (ref 4–11)

## 2024-04-01 PROCEDURE — 80048 BASIC METABOLIC PNL TOTAL CA: CPT

## 2024-04-01 PROCEDURE — 84100 ASSAY OF PHOSPHORUS: CPT

## 2024-04-01 PROCEDURE — 85025 COMPLETE CBC W/AUTO DIFF WBC: CPT

## 2024-04-01 PROCEDURE — 87798 DETECT AGENT NOS DNA AMP: CPT

## 2024-04-01 PROCEDURE — 83735 ASSAY OF MAGNESIUM: CPT

## 2024-04-01 PROCEDURE — 82570 ASSAY OF URINE CREATININE: CPT

## 2024-04-01 PROCEDURE — 87086 URINE CULTURE/COLONY COUNT: CPT

## 2024-04-01 PROCEDURE — 80197 ASSAY OF TACROLIMUS: CPT

## 2024-04-01 PROCEDURE — 83036 HEMOGLOBIN GLYCOSYLATED A1C: CPT

## 2024-04-01 PROCEDURE — 87799 DETECT AGENT NOS DNA QUANT: CPT

## 2024-04-01 PROCEDURE — 82043 UR ALBUMIN QUANTITATIVE: CPT

## 2024-04-01 PROCEDURE — 36415 COLL VENOUS BLD VENIPUNCTURE: CPT

## 2024-04-01 PROCEDURE — 84156 ASSAY OF PROTEIN URINE: CPT

## 2024-04-04 LAB — TACROLIMUS LVL: 7.1 NG/ML

## 2024-05-07 ENCOUNTER — OFFICE VISIT (OUTPATIENT)
Dept: PODIATRY CLINIC | Facility: CLINIC | Age: 59
End: 2024-05-07
Payer: MEDICARE

## 2024-05-07 DIAGNOSIS — E11.8 TYPE 2 DIABETES MELLITUS WITH COMPLICATION, WITHOUT LONG-TERM CURRENT USE OF INSULIN (HCC): Primary | ICD-10-CM

## 2024-05-07 PROCEDURE — 99203 OFFICE O/P NEW LOW 30 MIN: CPT | Performed by: PODIATRIST

## 2024-05-07 NOTE — PROGRESS NOTES
[facility]  Progress Note    Reza Khan is a 59 year old male.   Chief Complaint   Patient presents with    Diabetic Foot Care     Consult - last [id number]=8.6 from 4/1/24 - has thick toenails - this AM his EW=345         HPI:     This is a pleasant type 2 diabetic male.  He does have CKD with PMH of Kidney transplant, CHF with PMH of cardiac bypass, and glaucoma.      He is on norco 10 due to chronic low back pain.      He presents to clinic today for diabetic foot examination.  He denies any numbness to his feet.        Allergies: Patient has no known allergies.   Current Outpatient Medications   Medication Sig Dispense Refill    zolpidem 10 MG Oral Tab Take 1 tablet (10 mg total) by mouth nightly. 30 tablet 0    HYDROcodone-acetaminophen  MG Oral Tab Take 1 tablet by mouth every 6 (six) hours as needed for Pain. 90 tablet 0    insulin aspart 100 Units/mL Subcutaneous Solution Pen-injector Inject into the skin. Sliding scale      Blood Glucose Monitoring Suppl (TRUE METRIX AIR GLUCOSE METER) w/Device Does not apply Kit Dx: E11.8 & insulin dependent 1 kit 0    lisinopril 5 MG Oral Tab Take 1 tablet (5 mg total) by mouth daily.      atorvastatin 80 MG Oral Tab Take 1 tablet (80 mg total) by mouth daily. 90 tablet 1    ofloxacin 0.3 % Ophthalmic Solution Place 1 drop into both eyes 2 (two) times daily.      dorzolamide-timolol 22.3-6.8 MG/ML Ophthalmic Solution Place 1 drop into both eyes 2 (two) times daily.      brimonidine 0.2 % Ophthalmic Solution Place 1 drop into the left eye 2 (two) times daily.      ASPIRIN LOW DOSE 81 MG Oral Tab EC TAKE 1 TABLET(81 MG) BY MOUTH DAILY 90 tablet 1    semaglutide 4 MG/3ML Subcutaneous Solution Pen-injector Inject 1 mg into the skin once a week.      TRUEPLUS PEN NEEDLES 31G X 8 MM Does not apply Misc Dispense as ordered 100 each 0    dorzolamide 2 % Ophthalmic Solution Place 1 drop into the left eye 3 (three) times daily.      Tacrolimus ER 1 MG Oral Tablet  24 Hr Take 1 tablet (1 mg total) by mouth daily. 2 Tabs every morning 150 tablet 0    metoprolol succinate ER 50 MG Oral Tablet 24 Hr Take 0.5 tablets (25 mg total) by mouth daily. 90 tablet 1    OneTouch UltraSoft Lancets Does not apply Misc 1 each daily as needed.      MAGNESIUM-OXIDE 400 (240 Mg) MG Oral Tab Take 2 tablets (800 mg total) by mouth in the morning, at noon, and at bedtime.      LANTUS SOLOSTAR 100 UNIT/ML Subcutaneous Solution Pen-injector 28 Units nightly.      mycophenolate sodium 360 MG Oral Tab EC Take 1 tablet (360 mg total) by mouth before breakfast, before lunch, before evening meal, and at bedtime.      Glucose Blood (ONETOUCH VERIO) In Vitro Strip Check glucose three times daily. 100 strip 5    Glucose Blood In Vitro Strip Use as directed 50 strip 5    COMBIGAN 0.2-0.5 % Ophthalmic Solution Apply 1 drop to eye every 12 (twelve) hours.      Bimatoprost 0.01 % Ophthalmic Solution 1 drop by Each eye route nightly.        Past Medical History:    Essential hypertension    Glaucoma    High blood pressure    High cholesterol    Kidney disorder    Renal disorder    Retinal detachment    Retinopathy due to secondary diabetes mellitus (HCC)    Type II or unspecified type diabetes mellitus without mention of complication, not stated as uncontrolled      Past Surgical History:   Procedure Laterality Date    Back surgery      Bypass surgery      Cabg      9/27/2021 at [facility]      Family History   Problem Relation Age of Onset    Heart Disorder Mother     Hypertension Mother     Diabetes Mother     Obesity Mother     Kidney Disease Mother       Social History     Socioeconomic History    Marital status:    Tobacco Use    Smoking status: Never    Smokeless tobacco: Never   Vaping Use    Vaping status: Never Used   Substance and Sexual Activity    Alcohol use: Yes     Comment: rarely    Drug use: No   Other Topics Concern    Caffeine Concern No    Exercise No    Seat Belt No    Special Diet No     Stress Concern No    Weight Concern No           REVIEW OF SYSTEMS:   Denies nausea, fever, chills  No calf pain  No other muscle or joint aches  Denies chest pain or shortness of breath.      EXAM:   There were no vitals taken for this visit.    Constitutional:   Patient in no apparent distress. Well kept. Of normal body habitus. Alert and oriented to person, place, and time.  Vascular Examination:  DP pulse is 2/4  PT pulse is NP  Capillary refill is immediate  Integumentary Examination:   Digital hair growth is present left and is present right.  Skin is of diminished texture and decreased turgor.  Neurological Examination:  Monofilament (10-g) sensation is 5/5 to right and 5/5 to left.  Sharp/dull is present to right and is present to left.  Parasthesias absent.  Musculoskeletal Examination:  Muscle Strength is 5/5.      LABS & IMAGING:     Lab Results   Component Value Date    GLU 67 (L) 04/01/2024    BUN 14 04/01/2024    CREATSERUM 1.13 04/01/2024    BUNCREA 8.2 (L) 11/29/2023    ANIONGAP 7 04/01/2024    GFRAA 13 (L) 03/04/2022    GFRNAA 11 (L) 03/04/2022    CA 9.0 04/01/2024     04/01/2024    K 4.3 04/01/2024     04/01/2024    CO2 26.0 04/01/2024    OSMOCALC 295 04/01/2024        Lab Results   Component Value Date     (H) 04/01/2024    A1C 7.6 (H) 04/01/2024        No results found.     ASSESSMENT AND PLAN:   Diagnoses and all orders for this visit:    Type 2 diabetes mellitus with complication, without long-term current use of insulin (HCC)        Plan:     Diabetic education and instructions have been provided. We reviewed and discussed the following:    -risk categories related to pts with diabetes and foot or lower extremity complications per ADA.    -adherence to medication regimen and close monitoring or blood sugar control.   -daily monitoring/inspection of feet and shoes.   -proper management of diet and weight   -regular follow up with PCP and specialty providers as recommended    -Lower extremity complications related to DM were reviewed and stressed prevention.      RTC 1 year for CDFE.  If patient develops any foot pain or wound will get arterial US.    No follow-ups on file.    Gamal Hunter DPM  5/7/2024

## 2024-05-08 ENCOUNTER — LAB ENCOUNTER (OUTPATIENT)
Dept: LAB | Age: 59
End: 2024-05-08
Attending: INTERNAL MEDICINE
Payer: MEDICARE

## 2024-05-08 DIAGNOSIS — B27.90 EPSTEIN BARR INFECTION: ICD-10-CM

## 2024-05-08 DIAGNOSIS — B97.89 VIRAL SEPSIS (HCC): ICD-10-CM

## 2024-05-08 DIAGNOSIS — R73.9 HYPERGLYCEMIA: ICD-10-CM

## 2024-05-08 DIAGNOSIS — R78.81 BACTEREMIA: ICD-10-CM

## 2024-05-08 DIAGNOSIS — E83.42 HYPOMAGNESEMIA: ICD-10-CM

## 2024-05-08 DIAGNOSIS — Z94.0 KIDNEY REPLACED BY TRANSPLANT (HCC): ICD-10-CM

## 2024-05-08 DIAGNOSIS — B25.9 CMV INFECTION (HCC): ICD-10-CM

## 2024-05-08 DIAGNOSIS — A41.89 VIRAL SEPSIS (HCC): ICD-10-CM

## 2024-05-08 DIAGNOSIS — N39.0 URINARY TRACT INFECTION, SITE NOT SPECIFIED: ICD-10-CM

## 2024-05-08 DIAGNOSIS — Z51.81 ENCOUNTER FOR THERAPEUTIC DRUG MONITORING: ICD-10-CM

## 2024-05-08 DIAGNOSIS — E78.5 HYPERLIPIDEMIA: ICD-10-CM

## 2024-05-08 DIAGNOSIS — R80.9 PROTEINURIA: ICD-10-CM

## 2024-05-08 DIAGNOSIS — E55.9 VITAMIN D DEFICIENCY: ICD-10-CM

## 2024-05-08 DIAGNOSIS — Z94.0 KIDNEY TRANSPLANT RECIPIENT (HCC): ICD-10-CM

## 2024-05-08 DIAGNOSIS — D70.8 OTHER NEUTROPENIA (HCC): ICD-10-CM

## 2024-05-08 DIAGNOSIS — E11.9 DIABETES MELLITUS (HCC): ICD-10-CM

## 2024-05-08 LAB
ANION GAP SERPL CALC-SCNC: 6 MMOL/L (ref 0–18)
BASOPHILS # BLD AUTO: 0.03 X10(3) UL (ref 0–0.2)
BASOPHILS NFR BLD AUTO: 0.4 %
BILIRUB UR QL STRIP.AUTO: NEGATIVE
BUN BLD-MCNC: 21 MG/DL (ref 9–23)
CALCIUM BLD-MCNC: 8.8 MG/DL (ref 8.5–10.1)
CHLORIDE SERPL-SCNC: 107 MMOL/L (ref 98–112)
CLARITY UR REFRACT.AUTO: CLEAR
CO2 SERPL-SCNC: 24 MMOL/L (ref 21–32)
CREAT BLD-MCNC: 1.29 MG/DL
CREAT UR-SCNC: 121 MG/DL
CREAT UR-SCNC: 121 MG/DL
EGFRCR SERPLBLD CKD-EPI 2021: 65 ML/MIN/1.73M2 (ref 60–?)
EOSINOPHIL # BLD AUTO: 0.07 X10(3) UL (ref 0–0.7)
EOSINOPHIL NFR BLD AUTO: 1 %
ERYTHROCYTE [DISTWIDTH] IN BLOOD BY AUTOMATED COUNT: 13.2 %
EST. AVERAGE GLUCOSE BLD GHB EST-MCNC: 169 MG/DL (ref 68–126)
FASTING STATUS PATIENT QL REPORTED: YES
GLUCOSE BLD-MCNC: 120 MG/DL (ref 70–99)
GLUCOSE UR STRIP.AUTO-MCNC: NORMAL MG/DL
HBA1C MFR BLD: 7.5 % (ref ?–5.7)
HCT VFR BLD AUTO: 37.6 %
HGB BLD-MCNC: 12.2 G/DL
IMM GRANULOCYTES # BLD AUTO: 0.02 X10(3) UL (ref 0–1)
IMM GRANULOCYTES NFR BLD: 0.3 %
KETONES UR STRIP.AUTO-MCNC: NEGATIVE MG/DL
LEUKOCYTE ESTERASE UR QL STRIP.AUTO: NEGATIVE
LYMPHOCYTES # BLD AUTO: 1.52 X10(3) UL (ref 1–4)
LYMPHOCYTES NFR BLD AUTO: 21.2 %
MAGNESIUM SERPL-MCNC: 1.9 MG/DL (ref 1.6–2.6)
MCH RBC QN AUTO: 28.6 PG (ref 26–34)
MCHC RBC AUTO-ENTMCNC: 32.4 G/DL (ref 31–37)
MCV RBC AUTO: 88.1 FL
MICROALBUMIN UR-MCNC: 12.5 MG/DL
MICROALBUMIN/CREAT 24H UR-RTO: 103.3 UG/MG (ref ?–30)
MONOCYTES # BLD AUTO: 0.44 X10(3) UL (ref 0.1–1)
MONOCYTES NFR BLD AUTO: 6.1 %
NEUTROPHILS # BLD AUTO: 5.1 X10 (3) UL (ref 1.5–7.7)
NEUTROPHILS # BLD AUTO: 5.1 X10(3) UL (ref 1.5–7.7)
NEUTROPHILS NFR BLD AUTO: 71 %
NITRITE UR QL STRIP.AUTO: NEGATIVE
OSMOLALITY SERPL CALC.SUM OF ELEC: 288 MOSM/KG (ref 275–295)
PH UR STRIP.AUTO: 6.5 [PH] (ref 5–8)
PHOSPHATE SERPL-MCNC: 4 MG/DL (ref 2.5–4.9)
PLATELET # BLD AUTO: 177 10(3)UL (ref 150–450)
POTASSIUM SERPL-SCNC: 4.6 MMOL/L (ref 3.5–5.1)
PROT UR STRIP.AUTO-MCNC: 20 MG/DL
PROT UR-MCNC: 30.1 MG/DL
RBC # BLD AUTO: 4.27 X10(6)UL
RBC UR QL AUTO: NEGATIVE
SODIUM SERPL-SCNC: 137 MMOL/L (ref 136–145)
SP GR UR STRIP.AUTO: 1.01 (ref 1–1.03)
UROBILINOGEN UR STRIP.AUTO-MCNC: NORMAL MG/DL
WBC # BLD AUTO: 7.2 X10(3) UL (ref 4–11)

## 2024-05-08 PROCEDURE — 87086 URINE CULTURE/COLONY COUNT: CPT

## 2024-05-08 PROCEDURE — 84100 ASSAY OF PHOSPHORUS: CPT

## 2024-05-08 PROCEDURE — 82043 UR ALBUMIN QUANTITATIVE: CPT

## 2024-05-08 PROCEDURE — 87799 DETECT AGENT NOS DNA QUANT: CPT

## 2024-05-08 PROCEDURE — 36415 COLL VENOUS BLD VENIPUNCTURE: CPT

## 2024-05-08 PROCEDURE — 83735 ASSAY OF MAGNESIUM: CPT

## 2024-05-08 PROCEDURE — 85025 COMPLETE CBC W/AUTO DIFF WBC: CPT

## 2024-05-08 PROCEDURE — 82570 ASSAY OF URINE CREATININE: CPT

## 2024-05-08 PROCEDURE — 84156 ASSAY OF PROTEIN URINE: CPT

## 2024-05-08 PROCEDURE — 83036 HEMOGLOBIN GLYCOSYLATED A1C: CPT

## 2024-05-08 PROCEDURE — 81001 URINALYSIS AUTO W/SCOPE: CPT

## 2024-05-08 PROCEDURE — 80048 BASIC METABOLIC PNL TOTAL CA: CPT

## 2024-05-08 PROCEDURE — 80197 ASSAY OF TACROLIMUS: CPT

## 2024-05-08 PROCEDURE — 87798 DETECT AGENT NOS DNA AMP: CPT

## 2024-05-10 LAB
BK VIRUS DETECT, PL, QN LOG: NOT DETECTED {LOG_IU}/ML
BK VIRUS DETECT, PL, QN: NOT DETECTED [IU]/ML
CMV DNA DETECT, QN LOG: NOT DETECTED {LOG_IU}/ML
CMV DNA DETECT, QN: NOT DETECTED [IU]/ML
EBV DNA, QUANT PCR, PLASMA: NEGATIVE IU/ML
TACROLIMUS LVL: 8.4 NG/ML

## 2024-05-13 ENCOUNTER — OFFICE VISIT (OUTPATIENT)
Dept: ENDOCRINOLOGY CLINIC | Facility: CLINIC | Age: 59
End: 2024-05-13
Payer: MEDICARE

## 2024-05-13 VITALS
BODY MASS INDEX: 34.36 KG/M2 | HEART RATE: 79 BPM | WEIGHT: 182 LBS | HEIGHT: 60.98 IN | SYSTOLIC BLOOD PRESSURE: 123 MMHG | DIASTOLIC BLOOD PRESSURE: 75 MMHG

## 2024-05-13 DIAGNOSIS — E78.5 DYSLIPIDEMIA: ICD-10-CM

## 2024-05-13 DIAGNOSIS — Z79.4 TYPE 2 DIABETES MELLITUS WITH OTHER SPECIFIED COMPLICATION, WITH LONG-TERM CURRENT USE OF INSULIN (HCC): Primary | ICD-10-CM

## 2024-05-13 DIAGNOSIS — E11.69 TYPE 2 DIABETES MELLITUS WITH OTHER SPECIFIED COMPLICATION, WITH LONG-TERM CURRENT USE OF INSULIN (HCC): Primary | ICD-10-CM

## 2024-05-13 LAB
GLUCOSE BLOOD: 127
TEST STRIP LOT #: NORMAL NUMERIC

## 2024-05-13 NOTE — PROGRESS NOTES
FU visit:    CHIEF COMPLAINT:    Chief Complaint   Patient presents with    Diabetes     Pt is in for a Diabetes follow up. Glucose check        HISTORY OF PRESENT ILLNESS:   Reza Khan is a 59 year old male who is here to FU for DM.     DM HISTORY  Diagnosed: Around age 37      HISTORY OF DIABETES COMPLICATIONS: :  History of Retinopathy: + retinopathy + glaucoma, loss of vision in right eye s/p surgery  - last eye exam : 2024, follows on a regular basis  History of Neuropathy: denies  History of Nephropathy: March 14 th 2022 had a renal transplant    ASSOCIATED COMPLICATIONS:   HTN: yes  Hyperlipidemia: yes  Coronary Artery Disease:  denies  Cerebrovascular Disease: yes      HOME GLUCOSE READINGS:   Rogelio download as below        CURRENT DIABETIC MEDICATIONS INCLUDE:  Novolog:based on scale ( not taking scheduled 2 units)   Pre meal sugar   150-200: take 2 unit  201-250: take 3 units  > 251: take 4 units    Lantus  28  nightly   ozempic 1 mg weekly      MEALS:  Moderate compliance    EXERCISE:  denies      CURRENT MEDICATIONS:    Current Outpatient Medications   Medication Sig Dispense Refill    insulin aspart 100 Units/mL Subcutaneous Solution Pen-injector Inject into the skin. Sliding scale      semaglutide 4 MG/3ML Subcutaneous Solution Pen-injector Inject 1 mg into the skin once a week.      LANTUS SOLOSTAR 100 UNIT/ML Subcutaneous Solution Pen-injector 28 Units nightly.      zolpidem 10 MG Oral Tab Take 1 tablet (10 mg total) by mouth nightly. 30 tablet 0    HYDROcodone-acetaminophen  MG Oral Tab Take 1 tablet by mouth every 6 (six) hours as needed for Pain. 90 tablet 0    Blood Glucose Monitoring Suppl (TRUE METRIX AIR GLUCOSE METER) w/Device Does not apply Kit Dx: E11.8 & insulin dependent 1 kit 0    lisinopril 5 MG Oral Tab Take 1 tablet (5 mg total) by mouth daily.      atorvastatin 80 MG Oral Tab Take 1 tablet (80 mg total) by mouth daily. 90 tablet 1    ofloxacin 0.3 % Ophthalmic Solution Place  1 drop into both eyes 2 (two) times daily.      dorzolamide-timolol 22.3-6.8 MG/ML Ophthalmic Solution Place 1 drop into both eyes 2 (two) times daily.      brimonidine 0.2 % Ophthalmic Solution Place 1 drop into the left eye 2 (two) times daily.      ASPIRIN LOW DOSE 81 MG Oral Tab EC TAKE 1 TABLET(81 MG) BY MOUTH DAILY 90 tablet 1    TRUEPLUS PEN NEEDLES 31G X 8 MM Does not apply Misc Dispense as ordered 100 each 0    dorzolamide 2 % Ophthalmic Solution Place 1 drop into the left eye 3 (three) times daily.      Tacrolimus ER 1 MG Oral Tablet 24 Hr Take 1 tablet (1 mg total) by mouth daily. 2 Tabs every morning 150 tablet 0    metoprolol succinate ER 50 MG Oral Tablet 24 Hr Take 0.5 tablets (25 mg total) by mouth daily. 90 tablet 1    OneTouch UltraSoft Lancets Does not apply Misc 1 each daily as needed.      MAGNESIUM-OXIDE 400 (240 Mg) MG Oral Tab Take 2 tablets (800 mg total) by mouth in the morning, at noon, and at bedtime.      mycophenolate sodium 360 MG Oral Tab EC Take 1 tablet (360 mg total) by mouth before breakfast, before lunch, before evening meal, and at bedtime.      Glucose Blood (ONETOUCH VERIO) In Vitro Strip Check glucose three times daily. 100 strip 5    Glucose Blood In Vitro Strip Use as directed 50 strip 5    COMBIGAN 0.2-0.5 % Ophthalmic Solution Apply 1 drop to eye every 12 (twelve) hours.      Bimatoprost 0.01 % Ophthalmic Solution 1 drop by Each eye route nightly.         PAST MEDICAL HISTORY:   Past Medical History:    Essential hypertension    Glaucoma    High blood pressure    High cholesterol    Kidney disorder    Renal disorder    Retinal detachment    Retinopathy due to secondary diabetes mellitus (HCC)    Type II or unspecified type diabetes mellitus without mention of complication, not stated as uncontrolled       PAST SURGICAL HISTORY:   Past Surgical History:   Procedure Laterality Date    Back surgery      Bypass surgery      Cabg      9/27/2021 at [facility]       ALLERGIES:  No  Known Allergies    SOCIAL HISTORY:    Social History     Socioeconomic History    Marital status:    Tobacco Use    Smoking status: Never    Smokeless tobacco: Never   Vaping Use    Vaping status: Never Used   Substance and Sexual Activity    Alcohol use: Yes     Comment: rarely    Drug use: No   Other Topics Concern    Caffeine Concern No    Exercise No    Seat Belt No    Special Diet No    Stress Concern No    Weight Concern No       FAMILY HISTORY:   Family History   Problem Relation Age of Onset    Heart Disorder Mother     Hypertension Mother     Diabetes Mother     Obesity Mother     Kidney Disease Mother        ASSESSMENTS:        REVIEW OF SYSTEMS:  Constitutional: Negative for: weight change, fever, fatigue, cold/heat intolerance  Eyes: Negative for:  Visual changes, proptosis, blurring, floaters, poor night vision, impaired color vision  ENT: Negative for:  dysphagia, neck swelling, dysphonia  Respiratory: Negative for:  dyspnea, cough  Cardiovascular: Negative for:  chest pain, palpitations, orthopnea  GI: Negative for:  abdominal pain, nausea, vomiting, diarrhea, constipation, bleeding  Neurology: Negative for: headache, numbness, weakness,   Genito-Urinary: Negative for: dysuria, frequency  Psychiatric: Negative for:  depression, anxiety  Hematology/Lymphatics: Negative for: bruising, lower extremity edema  Endocrine: Negative for: polyuria, polydypsia  Skin: Negative for: rash, blister,      PHYSICAL EXAM:   Vitals:    05/13/24 1005   BP: 123/75   Pulse: 79   Weight: 182 lb (82.6 kg)   Height: 5' 0.98\" (1.549 m)     BMI: Body mass index is 34.41 kg/m².         General Appearance:  alert, well developed, in no acute distress  Head: Atraumatic  Eyes:  normal conjunctivae, sclera., normal sclera and normal pupils  Throat/Neck: normal sound to voice. Normal hearing, normal speech  Respiratory:  Speaking in full sentences, non-labored. no increased work of breathing, no audible wheezing    Neuro:  motor grossly intact, moving all extremities without difficulty  Psychiatric:  oriented to time, self, and place  Extremities: 6/2023  Bilateral barefoot skin diabetic exam is normal, visualized feet and the appearance is normal.  Bilateral monofilament/sensation of both feet is normal.  Pulsation pedal pulse exam of both lower legs/feet is normal as well.            DATA:     Pertinent data reviewed  a1c 7.5 % ( 5/2024)    ASSESSMENT AND PLAN:    1. Type 2 DM: on MDI, s/p renal transplant      Plan:  Discussed the pathogenesis, natural course of diabetes. Patient understands the importance of glycemic control and the implications of uncontrolled diabetes including Diabetic ketoacidosis and various micro vascular and macrovascular complications.        a). Medications:      Continuous Glucose Monitoring Interpretation    Reza Khan has undergone continuous glucose monitoring with the personal lacie.  He was evaluated with the lacie from April 30- May 13, 2024.  His blood glucose tracings demonstrated:   Very high  3 %  High 24 %   Target 73 %   Low/ very low 0 %   As a result of his testing the following plan was made:     Novolog: scheduled 2 units with meal along with the scale  Pre meal sugar   150-200: take additional 1  unit  201-250: take additional 2 units  > 251: take additional 3  units    Lantus  28  nightly   ozempic 1 mg weekly  No personal or family history of MEN syndrome  Patient counselled regarding side effects including injection site reactions, nausea, vomiting, diarrhea, pancreatitis, gastroparesis and rare side effect osvaldo Brendan syndrome.      b). Recent GFR is good  c). Discussed importance of annual eye exams  d). Foot exam: Daily feet exam explained  e). BG log maintainence explained in great detail, to get log and glucometer on next visit.  f). Life style changes reviewed  g). Hypoglycemia recognition and management discussed    2. Patient’s BP is normal today  3. Dyslipidemia  A)  Discussed lifestyle modifications including reductions in dietary total and saturated fat, weight loss, aerobic exercise, and eating a diet rich in fruits and vegetables.  B) Statin therapy  Discussed the potential side effects of statins including muscle and liver injury.  rtc in 3 months with me     Orders Placed This Encounter   Procedures    POC HemoCue Glucose 201 (Finger stick glucose)           Haydee El MD

## 2024-07-02 ENCOUNTER — TELEPHONE (OUTPATIENT)
Dept: ENDOCRINOLOGY CLINIC | Facility: CLINIC | Age: 59
End: 2024-07-02

## 2024-07-02 NOTE — TELEPHONE ENCOUNTER
Patient stats that he received a call from this office about his ozempic prescription.  Please call.

## 2024-07-02 NOTE — TELEPHONE ENCOUNTER
Pt came to  PAP medication 4 boxes of Ozempic 4mg/3ml.     Pt understood and had no additional questions or concerns at this time.    Scan sheet printed, signature page placed in scan

## 2024-07-02 NOTE — TELEPHONE ENCOUNTER
Called and spoke to patient, informed him his Ozempic is ready to be picked up in our office. Patient understanding.

## 2024-07-08 ENCOUNTER — LAB ENCOUNTER (OUTPATIENT)
Dept: LAB | Age: 59
End: 2024-07-08
Attending: INTERNAL MEDICINE
Payer: MEDICARE

## 2024-07-08 DIAGNOSIS — R78.81 BACTEREMIA: ICD-10-CM

## 2024-07-08 DIAGNOSIS — A41.89 VIRAL SEPSIS (HCC): ICD-10-CM

## 2024-07-08 DIAGNOSIS — Z51.81 ENCOUNTER FOR THERAPEUTIC DRUG MONITORING: ICD-10-CM

## 2024-07-08 DIAGNOSIS — E55.9 VITAMIN D DEFICIENCY: ICD-10-CM

## 2024-07-08 DIAGNOSIS — R80.9 PROTEINURIA: ICD-10-CM

## 2024-07-08 DIAGNOSIS — N39.0 URINARY TRACT INFECTION, SITE NOT SPECIFIED: ICD-10-CM

## 2024-07-08 DIAGNOSIS — B27.90 EPSTEIN BARR INFECTION: ICD-10-CM

## 2024-07-08 DIAGNOSIS — Z94.0 KIDNEY REPLACED BY TRANSPLANT (HCC): ICD-10-CM

## 2024-07-08 DIAGNOSIS — D70.8 OTHER NEUTROPENIA (HCC): ICD-10-CM

## 2024-07-08 DIAGNOSIS — B97.89 VIRAL SEPSIS (HCC): ICD-10-CM

## 2024-07-08 DIAGNOSIS — E78.5 HYPERLIPIDEMIA: ICD-10-CM

## 2024-07-08 DIAGNOSIS — E11.9 DIABETES MELLITUS (HCC): ICD-10-CM

## 2024-07-08 DIAGNOSIS — B25.9 CMV INFECTION (HCC): ICD-10-CM

## 2024-07-08 DIAGNOSIS — Z94.0 KIDNEY TRANSPLANT RECIPIENT (HCC): ICD-10-CM

## 2024-07-08 DIAGNOSIS — R73.9 HYPERGLYCEMIA: ICD-10-CM

## 2024-07-08 DIAGNOSIS — E83.42 HYPOMAGNESEMIA: ICD-10-CM

## 2024-07-08 LAB
ANION GAP SERPL CALC-SCNC: 7 MMOL/L (ref 0–18)
BASOPHILS # BLD AUTO: 0.03 X10(3) UL (ref 0–0.2)
BASOPHILS NFR BLD AUTO: 0.5 %
BILIRUB UR QL STRIP.AUTO: NEGATIVE
BUN BLD-MCNC: 17 MG/DL (ref 9–23)
CALCIUM BLD-MCNC: 9 MG/DL (ref 8.5–10.1)
CHLORIDE SERPL-SCNC: 109 MMOL/L (ref 98–112)
CLARITY UR REFRACT.AUTO: CLEAR
CO2 SERPL-SCNC: 24 MMOL/L (ref 21–32)
CREAT BLD-MCNC: 1.26 MG/DL
CREAT UR-SCNC: 213 MG/DL
CREAT UR-SCNC: 213 MG/DL
EGFRCR SERPLBLD CKD-EPI 2021: 67 ML/MIN/1.73M2 (ref 60–?)
EOSINOPHIL # BLD AUTO: 0.06 X10(3) UL (ref 0–0.7)
EOSINOPHIL NFR BLD AUTO: 1 %
ERYTHROCYTE [DISTWIDTH] IN BLOOD BY AUTOMATED COUNT: 13.6 %
EST. AVERAGE GLUCOSE BLD GHB EST-MCNC: 146 MG/DL (ref 68–126)
FASTING STATUS PATIENT QL REPORTED: YES
GLUCOSE BLD-MCNC: 105 MG/DL (ref 70–99)
GLUCOSE UR STRIP.AUTO-MCNC: NORMAL MG/DL
HBA1C MFR BLD: 6.7 % (ref ?–5.7)
HCT VFR BLD AUTO: 36.6 %
HGB BLD-MCNC: 12.5 G/DL
IMM GRANULOCYTES # BLD AUTO: 0.01 X10(3) UL (ref 0–1)
IMM GRANULOCYTES NFR BLD: 0.2 %
KETONES UR STRIP.AUTO-MCNC: NEGATIVE MG/DL
LEUKOCYTE ESTERASE UR QL STRIP.AUTO: NEGATIVE
LYMPHOCYTES # BLD AUTO: 1.47 X10(3) UL (ref 1–4)
LYMPHOCYTES NFR BLD AUTO: 23.7 %
MAGNESIUM SERPL-MCNC: 2.2 MG/DL (ref 1.6–2.6)
MCH RBC QN AUTO: 30.5 PG (ref 26–34)
MCHC RBC AUTO-ENTMCNC: 34.2 G/DL (ref 31–37)
MCV RBC AUTO: 89.3 FL
MICROALBUMIN UR-MCNC: 17.9 MG/DL
MICROALBUMIN/CREAT 24H UR-RTO: 84 UG/MG (ref ?–30)
MONOCYTES # BLD AUTO: 0.41 X10(3) UL (ref 0.1–1)
MONOCYTES NFR BLD AUTO: 6.6 %
NEUTROPHILS # BLD AUTO: 4.22 X10 (3) UL (ref 1.5–7.7)
NEUTROPHILS # BLD AUTO: 4.22 X10(3) UL (ref 1.5–7.7)
NEUTROPHILS NFR BLD AUTO: 68 %
NITRITE UR QL STRIP.AUTO: NEGATIVE
OSMOLALITY SERPL CALC.SUM OF ELEC: 292 MOSM/KG (ref 275–295)
PH UR STRIP.AUTO: 6.5 [PH] (ref 5–8)
PHOSPHATE SERPL-MCNC: 3.8 MG/DL (ref 2.5–4.9)
PLATELET # BLD AUTO: 172 10(3)UL (ref 150–450)
POTASSIUM SERPL-SCNC: 4.6 MMOL/L (ref 3.5–5.1)
PROT UR STRIP.AUTO-MCNC: 30 MG/DL
PROT UR-MCNC: 43.6 MG/DL
RBC # BLD AUTO: 4.1 X10(6)UL
RBC UR QL AUTO: NEGATIVE
SODIUM SERPL-SCNC: 140 MMOL/L (ref 136–145)
SP GR UR STRIP.AUTO: 1.02 (ref 1–1.03)
UROBILINOGEN UR STRIP.AUTO-MCNC: NORMAL MG/DL
WBC # BLD AUTO: 6.2 X10(3) UL (ref 4–11)

## 2024-07-08 PROCEDURE — 80197 ASSAY OF TACROLIMUS: CPT

## 2024-07-08 PROCEDURE — 82570 ASSAY OF URINE CREATININE: CPT

## 2024-07-08 PROCEDURE — 87799 DETECT AGENT NOS DNA QUANT: CPT

## 2024-07-08 PROCEDURE — 84100 ASSAY OF PHOSPHORUS: CPT

## 2024-07-08 PROCEDURE — 87086 URINE CULTURE/COLONY COUNT: CPT

## 2024-07-08 PROCEDURE — 83036 HEMOGLOBIN GLYCOSYLATED A1C: CPT

## 2024-07-08 PROCEDURE — 87798 DETECT AGENT NOS DNA AMP: CPT

## 2024-07-08 PROCEDURE — 36415 COLL VENOUS BLD VENIPUNCTURE: CPT

## 2024-07-08 PROCEDURE — 84156 ASSAY OF PROTEIN URINE: CPT

## 2024-07-08 PROCEDURE — 85025 COMPLETE CBC W/AUTO DIFF WBC: CPT

## 2024-07-08 PROCEDURE — 83735 ASSAY OF MAGNESIUM: CPT

## 2024-07-08 PROCEDURE — 82043 UR ALBUMIN QUANTITATIVE: CPT

## 2024-07-08 PROCEDURE — 80048 BASIC METABOLIC PNL TOTAL CA: CPT

## 2024-07-08 PROCEDURE — 81001 URINALYSIS AUTO W/SCOPE: CPT

## 2024-07-10 LAB
BK VIRUS DETECT, PL, QN LOG: NOT DETECTED {LOG_IU}/ML
BK VIRUS DETECT, PL, QN: NOT DETECTED [IU]/ML
CMV DNA DETECT, QN LOG: NOT DETECTED {LOG_IU}/ML
CMV DNA DETECT, QN: NOT DETECTED [IU]/ML
EBV DNA, QUANT PCR, PLASMA: NEGATIVE IU/ML
TACROLIMUS LVL: 5.4 NG/ML

## 2024-07-23 PROBLEM — J44.89 ASTHMA WITH COPD (CHRONIC OBSTRUCTIVE PULMONARY DISEASE) (HCC): Chronic | Status: ACTIVE | Noted: 2024-07-23

## 2024-07-23 PROBLEM — D69.6 THROMBOCYTOPENIA (HCC): Chronic | Status: ACTIVE | Noted: 2024-07-23

## 2024-07-23 PROBLEM — D69.6 THROMBOCYTOPENIA: Chronic | Status: ACTIVE | Noted: 2024-07-23

## 2024-08-01 ENCOUNTER — OFFICE VISIT (OUTPATIENT)
Facility: CLINIC | Age: 59
End: 2024-08-01

## 2024-08-01 VITALS
SYSTOLIC BLOOD PRESSURE: 130 MMHG | DIASTOLIC BLOOD PRESSURE: 85 MMHG | HEART RATE: 80 BPM | WEIGHT: 180 LBS | BODY MASS INDEX: 34 KG/M2

## 2024-08-01 DIAGNOSIS — Z94.0 KIDNEY TRANSPLANT RECIPIENT (HCC): Primary | ICD-10-CM

## 2024-08-01 DIAGNOSIS — E11.21 DIABETIC GLOMERULOPATHY (HCC): ICD-10-CM

## 2024-08-01 DIAGNOSIS — I10 HYPERTENSION, UNSPECIFIED TYPE: ICD-10-CM

## 2024-08-01 PROCEDURE — 3075F SYST BP GE 130 - 139MM HG: CPT | Performed by: INTERNAL MEDICINE

## 2024-08-01 PROCEDURE — 3061F NEG MICROALBUMINURIA REV: CPT | Performed by: INTERNAL MEDICINE

## 2024-08-01 PROCEDURE — 99214 OFFICE O/P EST MOD 30 MIN: CPT | Performed by: INTERNAL MEDICINE

## 2024-08-01 PROCEDURE — 3079F DIAST BP 80-89 MM HG: CPT | Performed by: INTERNAL MEDICINE

## 2024-08-01 PROCEDURE — 3060F POS MICROALBUMINURIA REV: CPT | Performed by: INTERNAL MEDICINE

## 2024-08-01 PROCEDURE — 3044F HG A1C LEVEL LT 7.0%: CPT | Performed by: INTERNAL MEDICINE

## 2024-08-01 NOTE — PROGRESS NOTES
Progress Note     Reza Khan    Here for follow up  Has been told that he should primarily follow up with me after his last visit in Oct with Dr Veliz    He was on 2 mg Envarsus and now is on 1.5 mg in May      HISTORY:  Past Medical History:    Diabetes mellitus (HCC)    Essential hypertension    Glaucoma    High blood pressure    High cholesterol    Kidney disorder    Renal disorder    Retinal detachment    Retinopathy due to secondary diabetes mellitus (HCC)    Type II or unspecified type diabetes mellitus without mention of complication, not stated as uncontrolled      Past Surgical History:   Procedure Laterality Date    Back surgery      Bypass surgery      Cabg      9/27/2021 at [facility]           Medications (Active prior to today's visit):  Current Outpatient Medications   Medication Sig Dispense Refill    zolpidem 10 MG Oral Tab Take 1 tablet (10 mg total) by mouth nightly. 30 tablet 0    HYDROcodone-acetaminophen  MG Oral Tab Take 1 tablet by mouth every 6 (six) hours as needed for Pain. 90 tablet 0    fluticasone propionate 50 MCG/ACT Nasal Suspension 2 sprays by Each Nare route daily. 18.2 mL 0    EPINEPHrine 0.3 MG/0.3ML Injection Solution Auto-injector Inject 0.3 mL (1 each total) as directed as needed. 1 each 0    insulin aspart 100 Units/mL Subcutaneous Solution Pen-injector Inject into the skin. Sliding scale      lisinopril 5 MG Oral Tab Take 1 tablet (5 mg total) by mouth daily.      atorvastatin 80 MG Oral Tab Take 1 tablet (80 mg total) by mouth daily. 90 tablet 1    dorzolamide-timolol 22.3-6.8 MG/ML Ophthalmic Solution Place 1 drop into both eyes 2 (two) times daily.      brimonidine 0.2 % Ophthalmic Solution Place 1 drop into the left eye 2 (two) times daily.      ASPIRIN LOW DOSE 81 MG Oral Tab EC TAKE 1 TABLET(81 MG) BY MOUTH DAILY 90 tablet 1    semaglutide 4 MG/3ML Subcutaneous Solution Pen-injector Inject 1 mg into the skin once a week.      dorzolamide 2 % Ophthalmic  Solution Place 1 drop into the left eye 3 (three) times daily.      metoprolol succinate ER 50 MG Oral Tablet 24 Hr Take 0.5 tablets (25 mg total) by mouth daily. (Patient taking differently: Take 1 tablet (50 mg total) by mouth daily.) 90 tablet 1    MAGNESIUM-OXIDE 400 (240 Mg) MG Oral Tab Take 2 tablets (800 mg total) by mouth in the morning, at noon, and at bedtime.      LANTUS SOLOSTAR 100 UNIT/ML Subcutaneous Solution Pen-injector 28 Units nightly.      mycophenolate sodium 360 MG Oral Tab EC Take 1 tablet (360 mg total) by mouth before breakfast, before lunch, before evening meal, and at bedtime.      Bimatoprost 0.01 % Ophthalmic Solution 1 drop by Each eye route nightly.      Blood Glucose Monitoring Suppl (TRUE METRIX AIR GLUCOSE METER) w/Device Does not apply Kit Dx: E11.8 & insulin dependent 1 kit 0    TRUEPLUS PEN NEEDLES 31G X 8 MM Does not apply Misc Dispense as ordered 100 each 0    Tacrolimus ER 1 MG Oral Tablet 24 Hr Take 1 tablet (1 mg total) by mouth daily. 2 Tabs every morning (Patient taking differently: Take 0.75 mg by mouth in the morning and 0.75 mg before bedtime.) 150 tablet 0    OneTouch UltraSoft Lancets Does not apply Misc 1 each daily as needed.      Glucose Blood (ONETOUCH VERIO) In Vitro Strip Check glucose three times daily. 100 strip 5    Glucose Blood In Vitro Strip Use as directed 50 strip 5       Allergies:  Allergies   Allergen Reactions    Shrimp HIVES         ROS:     Constitutional:  Negative for decreased activity, fever, irritability and lethargy  ENMT:  Negative for ear drainage, hearing loss and nasal drainage  Eyes:  Negative for eye discharge and vision loss  Cardiovascular:  Negative for chest pain, sob  Respiratory:  Negative for cough, dyspnea and wheezing  Gastrointestinal:  Negative for abdominal pain, constipation  Genitourinary:  Negative for dysuria and hematuria  Endocrine:  Negative for abnormal sleep patterns  Hema/Lymph:  Negative for easy bleeding and easy  bruising  Integumentary:  Negative for pruritus and rash  Musculoskeletal:  Negative for bone/joint symptoms  Neurological:  Negative for gait disturbance  Psychiatric:  Negative for inappropriate interaction and psychiatric symptoms      Vitals:    08/01/24 1010   BP: 130/85   Pulse: 80       PHYSICAL EXAM:   Constitutional: appears well hydrated alert and responsive   Head/Face: normocephalic  Eyes/Vision: normal extraocular motion is intact  Nose/Mouth/Throat:mucous membranes are moist   Neck/Thyroid: neck is supple without adenopathy  Lymphatic: no abnormal cervical, supraclavicular adenopathy is noted  Respiratory:  lungs are clear to auscultation bilaterally  Cardiovascular: regular rate and rhythm   Abdomen: soft, non-tender, non-distended, BS normal  Skin/Hair: no unusual rashes present, no abnormal bruising noted  Musculoskeletal: no deformities  Extremities: no edema  Neurological:  Grossly normal       Lab Results   Component Value Date     (H) 07/08/2024     07/08/2024    K 4.6 07/08/2024     07/08/2024    CO2 24.0 07/08/2024    ANIONGAP 7 07/08/2024    BUN 17 07/08/2024    CREATSERUM 1.26 07/08/2024    CA 9.0 07/08/2024    OSMOCALC 292 07/08/2024    EGFRCR 67 07/08/2024    ALB 4.6 11/29/2023    PHOS 3.8 07/08/2024         ASSESSMENT/PLAN:   Assessment   1. Kidney transplant recipient ([facility])  The patient follows up at [facility], he has been told to follow-up with us here on out after October.    He continues on envarsus, mycophenolate    2. Hypertension, unspecified type  Blood pressure is stable on lisinopril    3. Diabetic glomerulopathy (HCC)  A1c has come down.  He is on insulin and Ozempic      The patient has labs that are scheduled to be drawn in October by [facility].  He will call prior to his January appointment for lab orders       Orders This Visit:  No orders of the defined types were placed in this encounter.      Meds This Visit:  Requested Prescriptions      No prescriptions  requested or ordered in this encounter       Imaging & Referrals:  None     8/1/2024   ZABRINA PRYOR MD    No follow-ups on file.

## 2024-08-13 ENCOUNTER — TELEPHONE (OUTPATIENT)
Dept: ENDOCRINOLOGY CLINIC | Facility: CLINIC | Age: 59
End: 2024-08-13

## 2024-08-13 ENCOUNTER — MED REC SCAN ONLY (OUTPATIENT)
Dept: ENDOCRINOLOGY CLINIC | Facility: CLINIC | Age: 59
End: 2024-08-13

## 2024-08-13 ENCOUNTER — OFFICE VISIT (OUTPATIENT)
Dept: ENDOCRINOLOGY CLINIC | Facility: CLINIC | Age: 59
End: 2024-08-13

## 2024-08-13 VITALS
WEIGHT: 181 LBS | DIASTOLIC BLOOD PRESSURE: 84 MMHG | BODY MASS INDEX: 34 KG/M2 | HEART RATE: 91 BPM | SYSTOLIC BLOOD PRESSURE: 131 MMHG

## 2024-08-13 DIAGNOSIS — E11.69 TYPE 2 DIABETES MELLITUS WITH OTHER SPECIFIED COMPLICATION, WITH LONG-TERM CURRENT USE OF INSULIN (HCC): ICD-10-CM

## 2024-08-13 DIAGNOSIS — E78.5 DYSLIPIDEMIA: Primary | ICD-10-CM

## 2024-08-13 DIAGNOSIS — Z79.4 TYPE 2 DIABETES MELLITUS WITH OTHER SPECIFIED COMPLICATION, WITH LONG-TERM CURRENT USE OF INSULIN (HCC): ICD-10-CM

## 2024-08-13 LAB
GLUCOSE BLOOD: 244
TEST STRIP LOT #: NORMAL NUMERIC

## 2024-08-13 PROCEDURE — 95251 CONT GLUC MNTR ANALYSIS I&R: CPT | Performed by: INTERNAL MEDICINE

## 2024-08-13 PROCEDURE — 82947 ASSAY GLUCOSE BLOOD QUANT: CPT | Performed by: INTERNAL MEDICINE

## 2024-08-13 PROCEDURE — 3075F SYST BP GE 130 - 139MM HG: CPT | Performed by: INTERNAL MEDICINE

## 2024-08-13 PROCEDURE — 3079F DIAST BP 80-89 MM HG: CPT | Performed by: INTERNAL MEDICINE

## 2024-08-13 PROCEDURE — 99214 OFFICE O/P EST MOD 30 MIN: CPT | Performed by: INTERNAL MEDICINE

## 2024-08-13 NOTE — PROGRESS NOTES
FU visit:    CHIEF COMPLAINT:      DM     HISTORY OF PRESENT ILLNESS:   Reza Khan is a 59 year old male who is here to FU for DM.     DM HISTORY  Diagnosed: Around age 37      HISTORY OF DIABETES COMPLICATIONS: :  History of Retinopathy: + retinopathy + glaucoma, loss of vision in right eye s/p surgery  - last eye exam : , follows on a regular basis  History of Neuropathy: denies  History of Nephropathy:  had a renal transplant    ASSOCIATED COMPLICATIONS:   HTN: yes  Hyperlipidemia: yes  Coronary Artery Disease:  denies  Cerebrovascular Disease: yes      HOME GLUCOSE READINGS:   Fastin-140  Pre dinner: 140-150    No low BG under 70    CURRENT DIABETIC MEDICATIONS INCLUDE:  Novolog : states that he has been taking 7-10 units TID with meals  Lantus  28  nightly   ozempic 1 mg weekly      MEALS:  Moderate compliance    EXERCISE:  denies      CURRENT MEDICATIONS:    Current Outpatient Medications   Medication Sig Dispense Refill    zolpidem 10 MG Oral Tab Take 1 tablet (10 mg total) by mouth nightly. 30 tablet 0    HYDROcodone-acetaminophen  MG Oral Tab Take 1 tablet by mouth every 6 (six) hours as needed for Pain. 90 tablet 0    fluticasone propionate 50 MCG/ACT Nasal Suspension 2 sprays by Each Nare route daily. 18.2 mL 0    EPINEPHrine 0.3 MG/0.3ML Injection Solution Auto-injector Inject 0.3 mL (1 each total) as directed as needed. 1 each 0    insulin aspart 100 Units/mL Subcutaneous Solution Pen-injector Inject into the skin. Sliding scale      Blood Glucose Monitoring Suppl (TRUE METRIX AIR GLUCOSE METER) w/Device Does not apply Kit Dx: E11.8 & insulin dependent 1 kit 0    lisinopril 5 MG Oral Tab Take 1 tablet (5 mg total) by mouth daily.      atorvastatin 80 MG Oral Tab Take 1 tablet (80 mg total) by mouth daily. 90 tablet 1    dorzolamide-timolol 22.3-6.8 MG/ML Ophthalmic Solution Place 1 drop into both eyes 2 (two) times daily.      brimonidine 0.2 % Ophthalmic Solution Place  1 drop into the left eye 2 (two) times daily.      ASPIRIN LOW DOSE 81 MG Oral Tab EC TAKE 1 TABLET(81 MG) BY MOUTH DAILY 90 tablet 1    semaglutide 4 MG/3ML Subcutaneous Solution Pen-injector Inject 1 mg into the skin once a week.      TRUEPLUS PEN NEEDLES 31G X 8 MM Does not apply Misc Dispense as ordered 100 each 0    dorzolamide 2 % Ophthalmic Solution Place 1 drop into the left eye 3 (three) times daily.      Tacrolimus ER 1 MG Oral Tablet 24 Hr Take 1 tablet (1 mg total) by mouth daily. 2 Tabs every morning (Patient taking differently: Take 0.75 mg by mouth in the morning and 0.75 mg before bedtime.) 150 tablet 0    metoprolol succinate ER 50 MG Oral Tablet 24 Hr Take 0.5 tablets (25 mg total) by mouth daily. (Patient taking differently: Take 1 tablet (50 mg total) by mouth daily.) 90 tablet 1    OneTouch UltraSoft Lancets Does not apply Misc 1 each daily as needed.      MAGNESIUM-OXIDE 400 (240 Mg) MG Oral Tab Take 2 tablets (800 mg total) by mouth in the morning, at noon, and at bedtime.      LANTUS SOLOSTAR 100 UNIT/ML Subcutaneous Solution Pen-injector 28 Units nightly.      mycophenolate sodium 360 MG Oral Tab EC Take 1 tablet (360 mg total) by mouth before breakfast, before lunch, before evening meal, and at bedtime.      Glucose Blood (ONETOUCH VERIO) In Vitro Strip Check glucose three times daily. 100 strip 5    Glucose Blood In Vitro Strip Use as directed 50 strip 5    Bimatoprost 0.01 % Ophthalmic Solution 1 drop by Each eye route nightly.         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)    Type II or unspecified type diabetes mellitus without mention of complication, not stated as uncontrolled       PAST SURGICAL HISTORY:   Past Surgical History:   Procedure Laterality Date     Back surgery      Bypass surgery      Cabg      9/27/2021 at [facility]       ALLERGIES:  Allergies   Allergen Reactions    Shrimp HIVES       SOCIAL HISTORY:    Social History     Socioeconomic History    Marital status:    Tobacco Use    Smoking status: Never    Smokeless tobacco: Never   Vaping Use    Vaping status: Never Used   Substance and Sexual Activity    Alcohol use: Yes     Comment: rarely    Drug use: No   Other Topics Concern    Caffeine Concern No    Exercise No    Seat Belt No    Special Diet No    Stress Concern No    Weight Concern No       FAMILY HISTORY:   Family History   Problem Relation Age of Onset    Heart Disorder Mother     Hypertension Mother     Diabetes Mother     Obesity Mother     Kidney Disease Mother        ASSESSMENTS:        REVIEW OF SYSTEMS:  Constitutional: Negative for: weight change, fever, fatigue, cold/heat intolerance  Eyes: Negative for:  Visual changes, proptosis, blurring, floaters, poor night vision, impaired color vision  ENT: Negative for:  dysphagia, neck swelling, dysphonia  Respiratory: Negative for:  dyspnea, cough  Cardiovascular: Negative for:  chest pain, palpitations, orthopnea  GI: Negative for:  abdominal pain, nausea, vomiting, diarrhea, constipation, bleeding  Neurology: Negative for: headache, numbness, weakness,   Genito-Urinary: Negative for: dysuria, frequency  Psychiatric: Negative for:  depression, anxiety  Hematology/Lymphatics: Negative for: bruising, lower extremity edema  Endocrine: Negative for: polyuria, polydypsia  Skin: Negative for: rash, blister,      PHYSICAL EXAM:   Vitals:    08/13/24 1109   BP: 144/83   Pulse: 91   Weight: 181 lb (82.1 kg)     BMI: Body mass index is 34.22 kg/m².         General Appearance:  alert, well developed, in no acute distress  Head: Atraumatic  Eyes:  normal conjunctivae, sclera., normal sclera and normal pupils  Throat/Neck: normal sound to voice. Normal hearing, normal speech  Respiratory:  Speaking in  full sentences, non-labored. no increased work of breathing, no audible wheezing    Neuro: motor grossly intact, moving all extremities without difficulty  Psychiatric:  oriented to time, self, and place  Extremities: 2023  Bilateral barefoot skin diabetic exam is normal, visualized feet and the appearance is normal.  Bilateral monofilament/sensation of both feet is normal.  Pulsation pedal pulse exam of both lower legs/feet is normal as well.            DATA:     Pertinent data reviewed  a1c 6.7 % ( 8/2024)    ASSESSMENT AND PLAN:    1. Type 2 DM: on MDI, s/p renal transplant      Plan:  Discussed the pathogenesis, natural course of diabetes. Patient understands the importance of glycemic control and the implications of uncontrolled diabetes including Diabetic ketoacidosis and various micro vascular and macrovascular complications.        a). Medications:      Novolog: Decrease 5-7 TID with meals  Lantus  28--> 24   nightly   ozempic 1 --> 2 mg weekly  No personal or family history of MEN syndrome  Patient counselled regarding side effects including injection site reactions, nausea, vomiting, diarrhea, pancreatitis, gastroparesis and rare side effect osvaldo Brendan syndrome.      b). Recent GFR is stable  c). Discussed importance of annual eye exams  d). Foot exam: Daily feet exam explained  e). BG log maintainence explained in great detail, to get log and glucometer on next visit.  f). Life style changes reviewed  g). Hypoglycemia recognition and management discussed    2. Patient’s BP is normal today  3. Dyslipidemia  A) Discussed lifestyle modifications including reductions in dietary total and saturated fat, weight loss, aerobic exercise, and eating a diet rich in fruits and vegetables.  B) Statin therapy  Discussed the potential side effects of statins including muscle and liver injury.  rtc in 3 months with me     Orders Placed This Encounter   Procedures    POC HemoCue Glucose 201 (Finger stick glucose)     Lipid Panel [E]           Haydee lE MD

## 2024-08-13 NOTE — TELEPHONE ENCOUNTER
[employer] PAP application for dose change/increase completed for ozempic 2mg - signed and faxed to [phone]    Placed in brown [employer] folder

## 2024-08-19 NOTE — TELEPHONE ENCOUNTER
Received fax from [employer] dated on 8/15/24 attached is a approval form stating the pt has been approved to received medicine through 12/31/24 as long as pt continue to meet guideline. A 4month supply of medicine is being shipped to physician office within 10-14 business days.    ID#[id number]

## 2024-10-03 ENCOUNTER — OFFICE VISIT (OUTPATIENT)
Dept: DERMATOLOGY CLINIC | Facility: CLINIC | Age: 59
End: 2024-10-03

## 2024-10-03 DIAGNOSIS — D22.9 MULTIPLE NEVI: Primary | ICD-10-CM

## 2024-10-03 DIAGNOSIS — Z94.0 HISTORY OF RENAL TRANSPLANTATION (HCC): ICD-10-CM

## 2024-10-03 DIAGNOSIS — L81.9 DYSCHROMIA: ICD-10-CM

## 2024-10-03 DIAGNOSIS — D84.821 IMMUNOCOMPROMISED STATE DUE TO DRUG THERAPY (HCC): ICD-10-CM

## 2024-10-03 DIAGNOSIS — L82.1 SK (SEBORRHEIC KERATOSIS): ICD-10-CM

## 2024-10-03 DIAGNOSIS — Z79.899 IMMUNOCOMPROMISED STATE DUE TO DRUG THERAPY (HCC): ICD-10-CM

## 2024-10-03 DIAGNOSIS — D23.9 BENIGN NEOPLASM OF SKIN, UNSPECIFIED LOCATION: ICD-10-CM

## 2024-10-03 DIAGNOSIS — L81.4 LENTIGO: ICD-10-CM

## 2024-10-03 PROCEDURE — 99203 OFFICE O/P NEW LOW 30 MIN: CPT | Performed by: DERMATOLOGY

## 2024-10-03 RX ORDER — HYDROQUINONE 40 MG/G
1 CREAM TOPICAL 2 TIMES DAILY
Qty: 30 G | Refills: 1 | Status: SHIPPED | OUTPATIENT
Start: 2024-10-03

## 2024-10-03 RX ORDER — OFLOXACIN 3 MG/ML
1 SOLUTION/ DROPS OPHTHALMIC 2 TIMES DAILY
COMMUNITY
Start: 2024-09-26

## 2024-10-03 RX ORDER — TRETINOIN 0.5 MG/G
CREAM TOPICAL
Qty: 20 G | Refills: 3 | Status: SHIPPED | OUTPATIENT
Start: 2024-10-03

## 2024-10-04 ENCOUNTER — TELEPHONE (OUTPATIENT)
Dept: DERMATOLOGY CLINIC | Facility: CLINIC | Age: 59
End: 2024-10-04

## 2024-10-04 ENCOUNTER — OFFICE VISIT (OUTPATIENT)
Dept: OTOLARYNGOLOGY | Facility: CLINIC | Age: 59
End: 2024-10-04

## 2024-10-04 ENCOUNTER — OFFICE VISIT (OUTPATIENT)
Dept: AUDIOLOGY | Facility: CLINIC | Age: 59
End: 2024-10-04

## 2024-10-04 VITALS — WEIGHT: 183 LBS | HEIGHT: 60 IN | BODY MASS INDEX: 35.93 KG/M2

## 2024-10-04 DIAGNOSIS — H90.3 SENSORINEURAL HEARING LOSS, BILATERAL: ICD-10-CM

## 2024-10-04 DIAGNOSIS — H91.8X3 OTHER SPECIFIED HEARING LOSS OF BOTH EARS: Primary | ICD-10-CM

## 2024-10-04 DIAGNOSIS — H91.93 BILATERAL HEARING LOSS, UNSPECIFIED HEARING LOSS TYPE: Primary | ICD-10-CM

## 2024-10-04 PROCEDURE — 3008F BODY MASS INDEX DOCD: CPT | Performed by: OTOLARYNGOLOGY

## 2024-10-04 PROCEDURE — 99203 OFFICE O/P NEW LOW 30 MIN: CPT | Performed by: OTOLARYNGOLOGY

## 2024-10-04 NOTE — PROGRESS NOTES
Reza Khan is a 59 year old male.    Chief Complaint   Patient presents with    New Patient    Hearing Loss     Patient reports bilateral hearing loss.       HISTORY OF PRESENT ILLNESS  He presents with primarily complaints of bilateral hearing loss.  Also complains of tinnitus at times.  Audiogram was performed today based on his complaints of hearing loss demonstrating moderate sensory hearing loss which becomes severe and around 4 to 6000 Hz and then up slopes to moderate hearing loss again at 8000 Hz.  Speech discrimination scores of about 90% bilaterally.  Tympanograms are somewhat shallow with somewhat negative middle ear pressures bilaterally.  No other signs, symptoms or complaints.      Social History     Socioeconomic History    Marital status:    Tobacco Use    Smoking status: Never    Smokeless tobacco: Never   Vaping Use    Vaping status: Never Used   Substance and Sexual Activity    Alcohol use: Yes     Comment: rarely    Drug use: No   Other Topics Concern    Caffeine Concern No    Exercise No    Seat Belt No    Special Diet No    Stress Concern No    Weight Concern No    Reaction to local anesthetic No    Pt has a pacemaker No    Pt has a defibrillator No       Family History   Problem Relation Age of Onset    Heart Disorder Mother     Hypertension Mother     Diabetes Mother     Obesity Mother     Kidney Disease Mother        Past Medical History:    Diabetes mellitus (HCC)    Essential hypertension    Glaucoma    High blood pressure    High cholesterol    Kidney disorder    Renal disorder    Retinal detachment    Retinopathy due to secondary diabetes mellitus (HCC)    Type II or unspecified type diabetes mellitus without mention of complication, not stated as uncontrolled       Past Surgical History:   Procedure Laterality Date    Back surgery      Bypass surgery      Cabg      9/27/2021 at [facility]         REVIEW OF SYSTEMS    System Neg/Pos Details   Constitutional Negative Fatigue, fever  and weight loss.   ENMT Negative Drooling.   Eyes Negative Blurred vision and vision changes.   Respiratory Negative Dyspnea and wheezing.   Cardio Negative Chest pain, irregular heartbeat/palpitations and syncope.   GI Negative Abdominal pain and diarrhea.   Endocrine Negative Cold intolerance and heat intolerance.   Neuro Negative Tremors.   Psych Negative Anxiety and depression.   Integumentary Negative Frequent skin infections, pigment change and rash.   Hema/Lymph Negative Easy bleeding and easy bruising.           PHYSICAL EXAM    Ht 5' (1.524 m)   Wt 183 lb (83 kg)   BMI 35.74 kg/m²        Constitutional Normal Overall appearance - Normal.   Psychiatric Normal Orientation - Oriented to time, place, person & situation. Appropriate mood and affect.   Neck Exam Normal Inspection - Normal. Palpation - Normal. Parotid gland - Normal. Thyroid gland - Normal.   Eyes Normal Conjunctiva - Right: Normal, Left: Normal. Pupil - Right: Normal, Left: Normal. Fundus - Right: Normal, Left: Normal.   Neurological Normal Memory - Normal. Cranial nerves - Cranial nerves II through XII grossly intact.   Head/Face Normal Facial features - Normal. Eyebrows - Normal. Skull - Normal.        Nasopharynx Normal External nose - Normal. Lips/teeth/gums - Normal. Tonsils - Normal. Oropharynx - Normal.   Ears Normal Inspection - Right: Normal, Left: Normal. Canal - Right: Normal, Left: Normal. TM - Right: Normal, Left: Normal.   Skin Normal Inspection - Normal.        Lymph Detail Normal Submental. Submandibular. Anterior cervical. Posterior cervical. Supraclavicular.        Nose/Mouth/Throat Normal External nose - Normal. Lips/teeth/gums - Normal. Tonsils - Normal. Oropharynx - Normal.   Nose/Mouth/Throat Normal Nares - Right: Normal Left: Normal. Septum -Normal  Turbinates - Right: Normal, Left: Normal.       Current Outpatient Medications:     ofloxacin 0.3 % Ophthalmic Solution, Place 1 drop into both eyes 2 (two) times daily.,  Disp: , Rfl:     Tretinoin 0.05 % External Cream, Apply a small amount to face 3 times weekly as directed at bedtime.for chemoprevention, Disp: 20 g, Rfl: 3    Hydroquinone 4 % External Cream, Apply 1 Application topically 2 (two) times daily. To darker spots on face, Disp: 30 g, Rfl: 1    HYDROcodone-acetaminophen  MG Oral Tab, Take 1 tablet by mouth every 6 (six) hours as needed for Pain., Disp: 90 tablet, Rfl: 0    zolpidem 10 MG Oral Tab, Take 1 tablet (10 mg total) by mouth nightly., Disp: 30 tablet, Rfl: 0    famotidine 20 MG Oral Tab, Take 1 tablet (20 mg total) by mouth daily., Disp: 30 tablet, Rfl: 2    traZODone 50 MG Oral Tab, Take 1 tablet (50 mg total) by mouth nightly., Disp: 30 tablet, Rfl: 1    fluticasone propionate 50 MCG/ACT Nasal Suspension, 2 sprays by Each Nare route daily., Disp: 18.2 mL, Rfl: 0    EPINEPHrine 0.3 MG/0.3ML Injection Solution Auto-injector, Inject 0.3 mL (1 each total) as directed as needed., Disp: 1 each, Rfl: 0    insulin aspart 100 Units/mL Subcutaneous Solution Pen-injector, Inject into the skin. Sliding scale, Disp: , Rfl:     Blood Glucose Monitoring Suppl (TRUE METRIX AIR GLUCOSE METER) w/Device Does not apply Kit, Dx: E11.8 & insulin dependent, Disp: 1 kit, Rfl: 0    lisinopril 5 MG Oral Tab, Take 1 tablet (5 mg total) by mouth daily., Disp: , Rfl:     atorvastatin 80 MG Oral Tab, Take 1 tablet (80 mg total) by mouth daily., Disp: 90 tablet, Rfl: 1    dorzolamide-timolol 22.3-6.8 MG/ML Ophthalmic Solution, Place 1 drop into both eyes 2 (two) times daily., Disp: , Rfl:     brimonidine 0.2 % Ophthalmic Solution, Place 1 drop into the left eye 2 (two) times daily., Disp: , Rfl:     ASPIRIN LOW DOSE 81 MG Oral Tab EC, TAKE 1 TABLET(81 MG) BY MOUTH DAILY, Disp: 90 tablet, Rfl: 1    semaglutide 4 MG/3ML Subcutaneous Solution Pen-injector, Inject 1 mg into the skin once a week., Disp: , Rfl:     TRUEPLUS PEN NEEDLES 31G X 8 MM Does not apply Misc, Dispense as ordered,  Disp: 100 each, Rfl: 0    dorzolamide 2 % Ophthalmic Solution, Place 1 drop into the left eye 3 (three) times daily., Disp: , Rfl:     Tacrolimus ER 1 MG Oral Tablet 24 Hr, Take 1 tablet (1 mg total) by mouth daily. 2 Tabs every morning (Patient taking differently: Take 0.75 mg by mouth in the morning and 0.75 mg before bedtime.), Disp: 150 tablet, Rfl: 0    metoprolol succinate ER 50 MG Oral Tablet 24 Hr, Take 0.5 tablets (25 mg total) by mouth daily. (Patient taking differently: Take 1 tablet (50 mg total) by mouth daily.), Disp: 90 tablet, Rfl: 1    OneTouch UltraSoft Lancets Does not apply Misc, 1 each daily as needed., Disp: , Rfl:     MAGNESIUM-OXIDE 400 (240 Mg) MG Oral Tab, Take 2 tablets (800 mg total) by mouth in the morning, at noon, and at bedtime., Disp: , Rfl:     LANTUS SOLOSTAR 100 UNIT/ML Subcutaneous Solution Pen-injector, 28 Units nightly., Disp: , Rfl:     mycophenolate sodium 360 MG Oral Tab EC, Take 1 tablet (360 mg total) by mouth before breakfast, before lunch, before evening meal, and at bedtime., Disp: , Rfl:     Glucose Blood (ONETOUCH VERIO) In Vitro Strip, Check glucose three times daily., Disp: 100 strip, Rfl: 5    Glucose Blood In Vitro Strip, Use as directed, Disp: 50 strip, Rfl: 5    Bimatoprost 0.01 % Ophthalmic Solution, 1 drop by Each eye route nightly., Disp: , Rfl:   ASSESSMENT AND PLAN    1. Bilateral hearing loss, unspecified hearing loss type  Does have moderate downsloping to severe high-frequency sensorineural hearing loss.  I did recommend that he consider amplification.  We discussed the fact that his tinnitus is most likely related to his underlying hearing loss.  I did give him a copy of his hearing test and I did recommend that he contact his insurance to see what type of hearing aid benefits are available to him.  Return to see me in 1 year with repeat audiogram.  - Audiology Referral - In Network        This note was prepared using Dragon [employer] voice recognition  dictation software. As a result errors may occur. When identified these errors have been corrected. While every attempt is made to correct errors during dictation discrepancies may still exist    Murphy Tinajero MD    10/4/2024    6:04 PM

## 2024-10-13 PROBLEM — Z94.0: Status: ACTIVE | Noted: 2024-10-13

## 2024-10-13 NOTE — PROGRESS NOTES
Reza Khan is a 59 year old male.  HPI:     CC:    Chief Complaint   Patient presents with    Full Skin Exam     \"New Patient\" with referral for melasma. Presents for FBSE. Has c/o dark spots on face and arm. Kidney transplant 3/14/22.  Denies personal or family Hx of skin cancer.           HISTORY:    Past Medical History:    Diabetes mellitus (HCC)    Essential hypertension    Glaucoma    High blood pressure    High cholesterol    Kidney disorder    Renal disorder    Retinal detachment    Retinopathy due to secondary diabetes mellitus (HCC)    Type II or unspecified type diabetes mellitus without mention of complication, not stated as uncontrolled      Past Surgical History:   Procedure Laterality Date    Back surgery      Bypass surgery      Cabg      9/27/2021 at [facility]      Family History   Problem Relation Age of Onset    Heart Disorder Mother     Hypertension Mother     Diabetes Mother     Obesity Mother     Kidney Disease Mother       Social History     Socioeconomic History    Marital status:    Tobacco Use    Smoking status: Never    Smokeless tobacco: Never   Vaping Use    Vaping status: Never Used   Substance and Sexual Activity    Alcohol use: Yes     Comment: rarely    Drug use: No   Other Topics Concern    Caffeine Concern No    Exercise No    Seat Belt No    Special Diet No    Stress Concern No    Weight Concern No    Reaction to local anesthetic No    Pt has a pacemaker No    Pt has a defibrillator No     Social Drivers of Health      [facility]    Housing Stability        Current Outpatient Medications   Medication Sig Dispense Refill    ofloxacin 0.3 % Ophthalmic Solution Place 1 drop into both eyes 2 (two) times daily.      Tretinoin 0.05 % External Cream Apply a small amount to face 3 times weekly as directed at bedtime.for chemoprevention 20 g 3    Hydroquinone 4 % External Cream Apply 1 Application topically 2 (two)  times daily. To darker spots on face 30 g 1    HYDROcodone-acetaminophen  MG Oral Tab Take 1 tablet by mouth every 6 (six) hours as needed for Pain. 90 tablet 0    zolpidem 10 MG Oral Tab Take 1 tablet (10 mg total) by mouth nightly. 30 tablet 0    famotidine 20 MG Oral Tab Take 1 tablet (20 mg total) by mouth daily. 30 tablet 2    traZODone 50 MG Oral Tab Take 1 tablet (50 mg total) by mouth nightly. 30 tablet 1    fluticasone propionate 50 MCG/ACT Nasal Suspension 2 sprays by Each Nare route daily. 18.2 mL 0    EPINEPHrine 0.3 MG/0.3ML Injection Solution Auto-injector Inject 0.3 mL (1 each total) as directed as needed. 1 each 0    insulin aspart 100 Units/mL Subcutaneous Solution Pen-injector Inject into the skin. Sliding scale      Blood Glucose Monitoring Suppl (TRUE METRIX AIR GLUCOSE METER) w/Device Does not apply Kit Dx: E11.8 & insulin dependent 1 kit 0    lisinopril 5 MG Oral Tab Take 1 tablet (5 mg total) by mouth daily.      atorvastatin 80 MG Oral Tab Take 1 tablet (80 mg total) by mouth daily. 90 tablet 1    dorzolamide-timolol 22.3-6.8 MG/ML Ophthalmic Solution Place 1 drop into both eyes 2 (two) times daily.      brimonidine 0.2 % Ophthalmic Solution Place 1 drop into the left eye 2 (two) times daily.      ASPIRIN LOW DOSE 81 MG Oral Tab EC TAKE 1 TABLET(81 MG) BY MOUTH DAILY 90 tablet 1    semaglutide 4 MG/3ML Subcutaneous Solution Pen-injector Inject 1 mg into the skin once a week.      TRUEPLUS PEN NEEDLES 31G X 8 MM Does not apply Misc Dispense as ordered 100 each 0    dorzolamide 2 % Ophthalmic Solution Place 1 drop into the left eye 3 (three) times daily.      Tacrolimus ER 1 MG Oral Tablet 24 Hr Take 1 tablet (1 mg total) by mouth daily. 2 Tabs every morning (Patient taking differently: Take 0.75 mg by mouth in the morning and 0.75 mg before bedtime.) 150 tablet 0    metoprolol succinate ER 50 MG Oral Tablet 24 Hr Take 0.5 tablets (25 mg total) by mouth daily. (Patient taking differently:  Take 1 tablet (50 mg total) by mouth daily.) 90 tablet 1    OneTouch UltraSoft Lancets Does not apply Misc 1 each daily as needed.      MAGNESIUM-OXIDE 400 (240 Mg) MG Oral Tab Take 2 tablets (800 mg total) by mouth in the morning, at noon, and at bedtime.      LANTUS SOLOSTAR 100 UNIT/ML Subcutaneous Solution Pen-injector 28 Units nightly.      mycophenolate sodium 360 MG Oral Tab EC Take 1 tablet (360 mg total) by mouth before breakfast, before lunch, before evening meal, and at bedtime.      Glucose Blood (ONETOUCH VERIO) In Vitro Strip Check glucose three times daily. 100 strip 5    Glucose Blood In Vitro Strip Use as directed 50 strip 5    Bimatoprost 0.01 % Ophthalmic Solution 1 drop by Each eye route nightly.       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)    Type II or unspecified type diabetes mellitus without mention of complication, not stated as uncontrolled     Past Surgical History:   Procedure Laterality Date    Back surgery      Bypass surgery      Cabg      9/27/2021 at [facility]     Social History     Socioeconomic History    Marital status:      Spouse name: Not on file    Number of children: Not on file    Years of education: Not on file    Highest education level: Not on file   Occupational History    Not on file   Tobacco Use    Smoking status: Never    Smokeless tobacco: Never   Vaping Use    Vaping status: Never Used   Substance and Sexual Activity    Alcohol use: Yes     Comment: rarely    Drug use: No    Sexual activity: Not on file   Other Topics Concern    Caffeine Concern No    Exercise No    Seat Belt No    Special Diet No    Stress Concern No    Weight Concern No    Grew up on a farm Not Asked    History of tanning Not Asked    Outdoor occupation Not Asked     Reaction to local anesthetic No    Pt has a pacemaker No    Pt has a defibrillator No   Social History Narrative    Not on file     Social Drivers of Health     Financial Resource Strain: Not on file   Food Insecurity: Not on file   Transportation Needs: Not on file   Physical Activity: Not on file   Stress: Not on file   Social Connections: Not on file   Housing Stability: Low Risk  (5/19/2022)    [facility]    Housing Stability     Mortgage Payment Concerns?: Not on file     Number of Places Lived in the Last Year: Not on file     Unstable Housing?: Not on file     Family History   Problem Relation Age of Onset    Heart Disorder Mother     Hypertension Mother     Diabetes Mother     Obesity Mother     Kidney Disease Mother        There were no vitals filed for this visit.    HPI:  Chief Complaint   Patient presents with    Full Skin Exam     \"New Patient\" with referral for melasma. Presents for FBSE. Has c/o dark spots on face and arm. Kidney transplant 3/14/22.  Denies personal or family Hx of skin cancer.     Patient with history of renal transplant on transplant medications conservative dyschromia over the face, changing lesion right lateral zygoma does generally use sunscreen.  Renal transplant 2022    No personal  or family history of skin cancer    Patient presents with concerns above.    Patient has been in their usual state of health.     Past notes/ records and appropriate/relevant lab results including pathology and past body maps reviewed. Including outside notes/ PCP notes as appropriate. Updated and new information noted in current visit.     ROS:  Denies other relevant systemic complaints.      History, medications, allergies reviewed as noted.    Allergies:  Shrimp     Physical Examination:     Well-developed well-nourished patient alert oriented in no acute distress.  Exam performed, including scalp, head, neck, face,nails, hair,  external eyes, including conjunctival mucosa, eyelids, lips external ears , arms, digits,palms.     Multiple light to medium brown, well marginated, uniformly pigmented, macules and papules 6 mm and less scattered on exam. pigmented lesions examined with dermoscopy benign-appearing patterns.     Waxy tannish keratotic papules scattered, cherry-red vascular papules scattered.    See map today's date for lesions noted .  See assessment and plan below for specific lesions.    Otherwise remarkable for lesions as noted on map.    See A/P  below for additional information:    Assessment / plan:    No orders of the defined types were placed in this encounter.      Meds & Refills for this Visit:  Requested Prescriptions     Signed Prescriptions Disp Refills    Tretinoin 0.05 % External Cream 20 g 3     Sig: Apply a small amount to face 3 times weekly as directed at bedtime.for chemoprevention    Hydroquinone 4 % External Cream 30 g 1     Sig: Apply 1 Application topically 2 (two) times daily. To darker spots on face         Encounter Diagnoses   Name Primary?    Multiple nevi Yes    Lentigo     Benign neoplasm of skin, unspecified location     Dyschromia     SK (seborrheic keratosis)     History of renal transplantation (HCC)     Immunocompromised state due to drug therapy (HCC)        Dyschromia face sunscreen SPF 30 or greater broad-spectrum applied daily reapplied over the face arms.  Dyschromia over the face hydroquinone, tretinoin.  Application structures discussed.    More keratotic area SK  Monitor.  Increase sun protection, tretinoin    No suspicious lesion currently however at risk given immunocompromise state.  Careful sun protection noted.  Follow-up 1 to 2 years or as needed    Benign-appearing nevi, no atypical features on dermoscopy reassurance given monitor.     Waxy tan keratotic papules lesions in areas of concern as noted reassurance given.  Benign nature discussed.  Possibility of cryo, alphahydroxy acids  over-the-counter retinol's discussed.     No other susupicious lesions on todays  exam.    Please refer to map for specific lesions.  See additional diagnoses.  Pros cons of various therapies, risks benefits discussed.Pathophysiology discussed with patient.  Therapeutic options reviewed.  See  Medications in grid.  Instructions reviewed at length.    Benign nevi, seborrheic  keratoses, cherry angiomas:  Reassurance regarding other benign skin lesions.Signs and symptoms of skin cancer, ABCDE's of melanoma discussed with patient. Sunscreen (broad-spectrum, ideally mineral-based-UVA/UVB -SPF 30 or higher) use encouraged, sun protection/sun protective clothing, self exams reviewed Followup as noted RTC ---routine checkup    6 mos -one year or p.r.n.    Encounter Times   Including precharting, reviewing chart, prior notes obtaining history: 10 minutes, medical exam :10 minutes, notes on body map, plan, counseling 10minutes My total time spent caring for the patient on the day of the encounter: 30 minutes     The patient indicates understanding of these issues and agrees to the plan.  The patient is asked to return as noted in follow-up/ above.    This note was generated using Dragon voice recognition software.  Please contact me regarding any confusion resulting from errors in recognition..  Note to patient and family: The 21st Century Cures Act makes medical notes like these available to patients. However, be advised this is a medical document. It is intended as [id number] communication and monitoring of a patient's care needs. It is written in medical language and may contain abbreviations or verbiage that are unfamiliar. It may appear blunt or direct. Medical documents are intended to carry relevant information, facts as evident and the clinical opinion of the practitioner.       [1]   Allergies  Allergen Reactions    Shrimp HIVES

## 2024-12-03 ENCOUNTER — TELEPHONE (OUTPATIENT)
Dept: ENDOCRINOLOGY CLINIC | Facility: CLINIC | Age: 59
End: 2024-12-03

## 2024-12-03 NOTE — TELEPHONE ENCOUNTER
Hello,     We have received your medication, 2 boxes of 2mg Ozempic, from your patient assistance program. Please pick this up from our [place] office within 3-5 business days.     Thank you,   Endocrinology Staff     Pt notified and to come and  medication at our [place] location on the 3 rd floor [address]. Pt understood and had no additional questions or concerns at this time.     Medication will be stored in Nephrology refrigerator    Tracking slip for signature and scan sheet printed.

## 2024-12-06 NOTE — TELEPHONE ENCOUNTER
Patient came in office today to  medication. I had patient signature on blue confirmation receipt and was sent to scanning.

## 2024-12-10 ENCOUNTER — TELEPHONE (OUTPATIENT)
Dept: NEPHROLOGY | Facility: CLINIC | Age: 59
End: 2024-12-10

## 2024-12-10 ENCOUNTER — OFFICE VISIT (OUTPATIENT)
Dept: ENDOCRINOLOGY CLINIC | Facility: CLINIC | Age: 59
End: 2024-12-10

## 2024-12-10 VITALS
SYSTOLIC BLOOD PRESSURE: 131 MMHG | DIASTOLIC BLOOD PRESSURE: 84 MMHG | HEIGHT: 60 IN | WEIGHT: 186 LBS | HEART RATE: 89 BPM | BODY MASS INDEX: 36.52 KG/M2

## 2024-12-10 DIAGNOSIS — E11.69 TYPE 2 DIABETES MELLITUS WITH OTHER SPECIFIED COMPLICATION, WITH LONG-TERM CURRENT USE OF INSULIN (HCC): ICD-10-CM

## 2024-12-10 DIAGNOSIS — Z79.4 TYPE 2 DIABETES MELLITUS WITH OTHER SPECIFIED COMPLICATION, WITH LONG-TERM CURRENT USE OF INSULIN (HCC): ICD-10-CM

## 2024-12-10 DIAGNOSIS — E78.5 DYSLIPIDEMIA: Primary | ICD-10-CM

## 2024-12-10 DIAGNOSIS — Z94.0 KIDNEY TRANSPLANT RECIPIENT (HCC): Primary | ICD-10-CM

## 2024-12-10 LAB
GLUCOSE BLOOD: 139
TEST STRIP LOT #: NORMAL NUMERIC

## 2024-12-10 PROCEDURE — 95251 CONT GLUC MNTR ANALYSIS I&R: CPT | Performed by: INTERNAL MEDICINE

## 2024-12-10 PROCEDURE — 99214 OFFICE O/P EST MOD 30 MIN: CPT | Performed by: INTERNAL MEDICINE

## 2024-12-10 PROCEDURE — 82947 ASSAY GLUCOSE BLOOD QUANT: CPT | Performed by: INTERNAL MEDICINE

## 2024-12-10 PROCEDURE — 3061F NEG MICROALBUMINURIA REV: CPT | Performed by: INTERNAL MEDICINE

## 2024-12-10 PROCEDURE — 3075F SYST BP GE 130 - 139MM HG: CPT | Performed by: INTERNAL MEDICINE

## 2024-12-10 PROCEDURE — 3051F HG A1C>EQUAL 7.0%<8.0%: CPT | Performed by: INTERNAL MEDICINE

## 2024-12-10 PROCEDURE — 3008F BODY MASS INDEX DOCD: CPT | Performed by: INTERNAL MEDICINE

## 2024-12-10 PROCEDURE — 3079F DIAST BP 80-89 MM HG: CPT | Performed by: INTERNAL MEDICINE

## 2024-12-10 NOTE — PROGRESS NOTES
FU visit:    CHIEF COMPLAINT:      DM   Dyslipidemia      HISTORY OF PRESENT ILLNESS:   Reza Khan is a 59 year old male who is here to FU for DM.     DM HISTORY  Diagnosed: Around age 37      HISTORY OF DIABETES COMPLICATIONS: :  History of Retinopathy: + retinopathy + glaucoma, loss of vision in right eye s/p surgery, Last eye exam :    - last eye exam : , follows on a regular basis  History of Neuropathy: denies  History of Nephropathy:  had a renal transplant    ASSOCIATED COMPLICATIONS:   HTN: yes  Hyperlipidemia: yes  Coronary Artery Disease:  denies  Cerebrovascular Disease: yes      HOME GLUCOSE READINGS:   Fastin-170  Pre dinner: 140-150 , at time sin the 70s    No low BG under 70    CURRENT DIABETIC MEDICATIONS INCLUDE:  Novolog : states that he has been taking 5  units TID with meals--> stopped for 2-3 weeks since sugars were going low in the 70s , just resumed it   Lantus  28  nightly   ozempic 1 mg weekly      Does not want sensor due to cost     MEALS:  Moderate compliance    EXERCISE:  denies      CURRENT MEDICATIONS:    Current Outpatient Medications   Medication Sig Dispense Refill    lisinopril 10 MG Oral Tab Take 1 tablet (10 mg total) by mouth daily.      zolpidem 10 MG Oral Tab Take 1 tablet (10 mg total) by mouth nightly. 30 tablet 0    HYDROcodone-acetaminophen  MG Oral Tab Take 1 tablet by mouth every 6 (six) hours as needed for Pain. 90 tablet 0    FLUTICASONE PROPIONATE 50 MCG/ACT Nasal Suspension SHAKE LIQUID AND USE 2 SPRAYS IN EACH NOSTRIL DAILY 16 g 0    TRAZODONE 50 MG Oral Tab TAKE 1 TABLET(50 MG) BY MOUTH EVERY NIGHT 30 tablet 1    ofloxacin 0.3 % Ophthalmic Solution Place 1 drop into both eyes 2 (two) times daily.      Tretinoin 0.05 % External Cream Apply a small amount to face 3 times weekly as directed at bedtime.for chemoprevention 20 g 3    Hydroquinone 4 % External Cream Apply 1 Application topically 2 (two) times daily. To darker spots on  face 30 g 1    famotidine 20 MG Oral Tab Take 1 tablet (20 mg total) by mouth daily. 30 tablet 2    EPINEPHrine 0.3 MG/0.3ML Injection Solution Auto-injector Inject 0.3 mL (1 each total) as directed as needed. 1 each 0    insulin aspart 100 Units/mL Subcutaneous Solution Pen-injector Inject into the skin. Sliding scale      Blood Glucose Monitoring Suppl (TRUE METRIX AIR GLUCOSE METER) w/Device Does not apply Kit Dx: E11.8 & insulin dependent 1 kit 0    atorvastatin 80 MG Oral Tab Take 1 tablet (80 mg total) by mouth daily. 90 tablet 1    dorzolamide-timolol 22.3-6.8 MG/ML Ophthalmic Solution Place 1 drop into both eyes 2 (two) times daily.      brimonidine 0.2 % Ophthalmic Solution Place 1 drop into the left eye 2 (two) times daily.      ASPIRIN LOW DOSE 81 MG Oral Tab EC TAKE 1 TABLET(81 MG) BY MOUTH DAILY 90 tablet 1    semaglutide 4 MG/3ML Subcutaneous Solution Pen-injector Inject 1 mg into the skin once a week.      TRUEPLUS PEN NEEDLES 31G X 8 MM Does not apply Misc Dispense as ordered 100 each 0    dorzolamide 2 % Ophthalmic Solution Place 1 drop into the left eye 3 (three) times daily.      Tacrolimus ER 1 MG Oral Tablet 24 Hr Take 1 tablet (1 mg total) by mouth daily. 2 Tabs every morning (Patient taking differently: Take 0.75 mg by mouth in the morning and 0.75 mg before bedtime.) 150 tablet 0    metoprolol succinate ER 50 MG Oral Tablet 24 Hr Take 0.5 tablets (25 mg total) by mouth daily. (Patient taking differently: Take 1 tablet (50 mg total) by mouth daily.) 90 tablet 1    OneTouch UltraSoft Lancets Does not apply Misc 1 each daily as needed.      MAGNESIUM-OXIDE 400 (240 Mg) MG Oral Tab Take 2 tablets (800 mg total) by mouth in the morning, at noon, and at bedtime.      LANTUS SOLOSTAR 100 UNIT/ML Subcutaneous Solution Pen-injector 28 Units nightly.      mycophenolate sodium 360 MG Oral Tab EC Take 1 tablet (360 mg total) by mouth before breakfast, before lunch, before evening meal, and at bedtime.       Glucose Blood (ONETOUCH VERIO) In Vitro Strip Check glucose three times daily. 100 strip 5    Glucose Blood In Vitro Strip Use as directed 50 strip 5    Bimatoprost 0.01 % Ophthalmic Solution 1 drop by Each eye route nightly.         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)    Type II or unspecified type diabetes mellitus without mention of complication, not stated as uncontrolled       PAST SURGICAL HISTORY:   Past Surgical History:   Procedure Laterality Date    Back surgery      Bypass surgery      Cabg      9/27/2021 at [facility]       ALLERGIES:  Allergies   Allergen Reactions    Shrimp HIVES       SOCIAL HISTORY:    Social History     Socioeconomic History    Marital status:    Tobacco Use    Smoking status: Never    Smokeless tobacco: Never   Vaping Use    Vaping status: Never Used   Substance and Sexual Activity    Alcohol use: Yes     Comment: rarely    Drug use: No   Other Topics Concern    Caffeine Concern No    Exercise No    Seat Belt No    Special Diet No    Stress Concern No    Weight Concern No    Reaction to local anesthetic No    Pt has a pacemaker No    Pt has a defibrillator No       FAMILY HISTORY:   Family History   Problem Relation Age of Onset    Heart Disorder Mother     Hypertension Mother     Diabetes Mother     Obesity Mother     Kidney Disease Mother        ASSESSMENTS:        REVIEW OF SYSTEMS:  Constitutional: Negative for: weight change, fever, fatigue, cold/heat intolerance  Eyes: Negative for:  Visual changes, proptosis, blurring, floaters, poor night vision, impaired color vision  ENT: Negative for:  dysphagia, neck swelling, dysphonia  Respiratory: Negative for:  dyspnea, cough  Cardiovascular: Negative for:  chest pain, palpitations, orthopnea  GI:  Negative for:  abdominal pain, nausea, vomiting, diarrhea, constipation, bleeding  Neurology: Negative for: headache, numbness, weakness,   Genito-Urinary: Negative for: dysuria, frequency  Psychiatric: Negative for:  depression, anxiety  Hematology/Lymphatics: Negative for: bruising, lower extremity edema  Endocrine: Negative for: polyuria, polydypsia  Skin: Negative for: rash, blister,      PHYSICAL EXAM:   Vitals:    12/10/24 1051   BP: 131/84   Pulse: 89   Weight: 186 lb (84.4 kg)   Height: 5' (1.524 m)       BMI: Body mass index is 36.33 kg/m².         General Appearance:  alert, well developed, in no acute distress  Head: Atraumatic  Eyes:  normal conjunctivae, sclera., normal sclera and normal pupils  Throat/Neck: normal sound to voice. Normal hearing, normal speech  Respiratory:  Speaking in full sentences, non-labored. no increased work of breathing, no audible wheezing    Neuro: motor grossly intact, moving all extremities without difficulty  Psychiatric:  oriented to time, self, and place  Extremities: Dec 2024:   Bilateral barefoot skin diabetic exam is normal, visualized feet and the appearance is normal.  Bilateral monofilament/sensation of both feet is normal.  Pulsation pedal pulse exam of both lower legs/feet is normal as well.            DATA:     Pertinent data reviewed  A1c 7.1   % ( 12/2024)    ASSESSMENT AND PLAN:    1. Type 2 DM: on MDI, s/p renal transplant      Plan:  Discussed the pathogenesis, natural course of diabetes. Patient understands the importance of glycemic control and the implications of uncontrolled diabetes including Diabetic ketoacidosis and various micro vascular and macrovascular complications.        a). Medications:      Novolog:  3 units  TID with meals  Lantus  28---> 30  nightly   ozempic  2 mg weekly  No personal or family history of MEN syndrome  Patient counselled regarding side effects including injection site reactions, nausea, vomiting, diarrhea, pancreatitis,  gastroparesis and rare side effect osvaldo Brendan syndrome.    Check sugars before meals and bedtime and send sugar on my chart in one week   Call sooner  if Bg go under 80       b). Recent GFR is stable  c). Discussed importance of annual eye exams  d). Foot exam: Daily feet exam explained  e). BG log maintainence explained in great detail, to get log and glucometer on next visit.  f). Life style changes reviewed  g). Hypoglycemia recognition and management discussed    2. Patient’s BP is normal today  3. Dyslipidemia  A) Discussed lifestyle modifications including reductions in dietary total and saturated fat, weight loss, aerobic exercise, and eating a diet rich in fruits and vegetables.  B) Statin therapy  Discussed the potential side effects of statins including muscle and liver injury.  rtc in 3 months with me     Orders Placed This Encounter   Procedures    POC HemoCue Glucose 201 (Finger stick glucose)           Haydee El MD

## 2024-12-10 NOTE — TELEPHONE ENCOUNTER
Patient came in office today for office visit with MD, during visit patient brought in [employer] PAP forms for Re-Enrollment 2025 for Rx Ozempic 2 MG.     PAP form was filled out and placed in providers folder for review and signature

## 2024-12-11 NOTE — TELEPHONE ENCOUNTER
Received signed forms for Patient Assistance Program Application on 12/11/2024, faxed over to [employer] [phone]

## 2024-12-28 ENCOUNTER — LAB ENCOUNTER (OUTPATIENT)
Dept: LAB | Age: 59
End: 2024-12-28
Attending: INTERNAL MEDICINE
Payer: MEDICARE

## 2024-12-28 DIAGNOSIS — D63.8 ANEMIA OF CHRONIC DISEASE: ICD-10-CM

## 2024-12-28 DIAGNOSIS — Z94.0 KIDNEY TRANSPLANT RECIPIENT (HCC): ICD-10-CM

## 2024-12-28 LAB
ALBUMIN SERPL-MCNC: 4.4 G/DL (ref 3.2–4.8)
ANION GAP SERPL CALC-SCNC: 6 MMOL/L (ref 0–18)
BUN BLD-MCNC: 16 MG/DL (ref 9–23)
CALCIUM BLD-MCNC: 9.4 MG/DL (ref 8.7–10.4)
CHLORIDE SERPL-SCNC: 107 MMOL/L (ref 98–112)
CO2 SERPL-SCNC: 25 MMOL/L (ref 21–32)
CREAT BLD-MCNC: 1.18 MG/DL
CREAT UR-SCNC: 158.5 MG/DL
DEPRECATED HBV CORE AB SER IA-ACNC: 7 NG/ML
EGFRCR SERPLBLD CKD-EPI 2021: 72 ML/MIN/1.73M2 (ref 60–?)
GLUCOSE BLD-MCNC: 125 MG/DL (ref 70–99)
IRON SATN MFR SERPL: 10 %
IRON SERPL-MCNC: 33 UG/DL
OSMOLALITY SERPL CALC.SUM OF ELEC: 289 MOSM/KG (ref 275–295)
PHOSPHATE SERPL-MCNC: 3.5 MG/DL (ref 2.4–5.1)
POTASSIUM SERPL-SCNC: 4.7 MMOL/L (ref 3.5–5.1)
PROT UR-MCNC: 52.2 MG/DL (ref ?–14)
PROT/CREAT UR-RTO: 0.33
PTH-INTACT SERPL-MCNC: 50.7 PG/ML (ref 18.5–88)
SODIUM SERPL-SCNC: 138 MMOL/L (ref 136–145)
TOTAL IRON BINDING CAPACITY: 347 UG/DL (ref 250–425)
TRANSFERRIN SERPL-MCNC: 267 MG/DL (ref 215–365)

## 2024-12-28 PROCEDURE — 82728 ASSAY OF FERRITIN: CPT

## 2024-12-28 PROCEDURE — 84156 ASSAY OF PROTEIN URINE: CPT

## 2024-12-28 PROCEDURE — 36415 COLL VENOUS BLD VENIPUNCTURE: CPT

## 2024-12-28 PROCEDURE — 82570 ASSAY OF URINE CREATININE: CPT

## 2024-12-28 PROCEDURE — 83550 IRON BINDING TEST: CPT

## 2024-12-28 PROCEDURE — 83970 ASSAY OF PARATHORMONE: CPT | Performed by: INTERNAL MEDICINE

## 2024-12-28 PROCEDURE — 80069 RENAL FUNCTION PANEL: CPT | Performed by: INTERNAL MEDICINE

## 2024-12-28 PROCEDURE — 83540 ASSAY OF IRON: CPT

## 2025-01-06 ENCOUNTER — OFFICE VISIT (OUTPATIENT)
Facility: CLINIC | Age: 60
End: 2025-01-06

## 2025-01-06 ENCOUNTER — TELEPHONE (OUTPATIENT)
Dept: ENDOCRINOLOGY CLINIC | Facility: CLINIC | Age: 60
End: 2025-01-06

## 2025-01-06 ENCOUNTER — TELEPHONE (OUTPATIENT)
Dept: INTERNAL MEDICINE CLINIC | Facility: CLINIC | Age: 60
End: 2025-01-06

## 2025-01-06 VITALS
SYSTOLIC BLOOD PRESSURE: 122 MMHG | WEIGHT: 185 LBS | BODY MASS INDEX: 36 KG/M2 | HEART RATE: 89 BPM | DIASTOLIC BLOOD PRESSURE: 80 MMHG

## 2025-01-06 DIAGNOSIS — Z94.0 KIDNEY TRANSPLANT RECIPIENT (HCC): Primary | ICD-10-CM

## 2025-01-06 DIAGNOSIS — E11.21 DIABETIC GLOMERULOPATHY (HCC): ICD-10-CM

## 2025-01-06 DIAGNOSIS — I10 HYPERTENSION, UNSPECIFIED TYPE: ICD-10-CM

## 2025-01-06 PROCEDURE — 3074F SYST BP LT 130 MM HG: CPT | Performed by: INTERNAL MEDICINE

## 2025-01-06 PROCEDURE — 99214 OFFICE O/P EST MOD 30 MIN: CPT | Performed by: INTERNAL MEDICINE

## 2025-01-06 PROCEDURE — 3079F DIAST BP 80-89 MM HG: CPT | Performed by: INTERNAL MEDICINE

## 2025-01-06 NOTE — PROGRESS NOTES
Progress Note     MickyYOSI Khan    Here for follow-up  Still seeing [facility] transplant Dr Yeh     10/24 - tac 5.8  HISTORY:  Past Medical History:    Diabetes mellitus (HCC)    Essential hypertension    Glaucoma    High blood pressure    High cholesterol    Kidney disorder    Renal disorder    Retinal detachment    Retinopathy due to secondary diabetes mellitus (HCC)    Type II or unspecified type diabetes mellitus without mention of complication, not stated as uncontrolled      Past Surgical History:   Procedure Laterality Date    Back surgery      Bypass surgery      Cabg      9/27/2021 at [facility]           Medications (Active prior to today's visit):  Current Outpatient Medications   Medication Sig Dispense Refill    HYDROcodone-acetaminophen  MG Oral Tab Take 1 tablet by mouth every 6 (six) hours as needed for Pain. 90 tablet 0    zolpidem 10 MG Oral Tab Take 1 tablet (10 mg total) by mouth nightly. 30 tablet 0    FAMOTIDINE 20 MG Oral Tab TAKE 1 TABLET(20 MG) BY MOUTH DAILY 30 tablet 2    lisinopril 10 MG Oral Tab Take 1 tablet (10 mg total) by mouth daily.      FLUTICASONE PROPIONATE 50 MCG/ACT Nasal Suspension SHAKE LIQUID AND USE 2 SPRAYS IN EACH NOSTRIL DAILY 16 g 0    ofloxacin 0.3 % Ophthalmic Solution Place 1 drop into both eyes 2 (two) times daily.      Hydroquinone 4 % External Cream Apply 1 Application topically 2 (two) times daily. To darker spots on face 30 g 1    EPINEPHrine 0.3 MG/0.3ML Injection Solution Auto-injector Inject 0.3 mL (1 each total) as directed as needed. 1 each 0    insulin aspart 100 Units/mL Subcutaneous Solution Pen-injector Inject into the skin. Sliding scale      atorvastatin 80 MG Oral Tab Take 1 tablet (80 mg total) by mouth daily. 90 tablet 1    dorzolamide-timolol 22.3-6.8 MG/ML Ophthalmic Solution Place 1 drop into both eyes 2 (two) times daily.      brimonidine 0.2 % Ophthalmic Solution Place 1 drop into the left eye 2 (two) times daily.      ASPIRIN LOW DOSE 81  MG Oral Tab EC TAKE 1 TABLET(81 MG) BY MOUTH DAILY 90 tablet 1    semaglutide 4 MG/3ML Subcutaneous Solution Pen-injector Inject 1 mg into the skin once a week.      dorzolamide 2 % Ophthalmic Solution Place 1 drop into the left eye 3 (three) times daily.      Tacrolimus ER 1 MG Oral Tablet 24 Hr Take 1 tablet (1 mg total) by mouth daily. 2 Tabs every morning 150 tablet 0    metoprolol succinate ER 50 MG Oral Tablet 24 Hr Take 0.5 tablets (25 mg total) by mouth daily. 90 tablet 1    MAGNESIUM-OXIDE 400 (240 Mg) MG Oral Tab Take 2 tablets (800 mg total) by mouth in the morning, at noon, and at bedtime.      LANTUS SOLOSTAR 100 UNIT/ML Subcutaneous Solution Pen-injector 28 Units nightly.      mycophenolate sodium 360 MG Oral Tab EC Take 1 tablet (360 mg total) by mouth before breakfast, before lunch, before evening meal, and at bedtime.      Bimatoprost 0.01 % Ophthalmic Solution 1 drop by Each eye route nightly.      TRAZODONE 50 MG Oral Tab TAKE 1 TABLET(50 MG) BY MOUTH EVERY NIGHT (Patient not taking: Reported on 1/6/2025) 30 tablet 1    Tretinoin 0.05 % External Cream Apply a small amount to face 3 times weekly as directed at bedtime.for chemoprevention 20 g 3    Blood Glucose Monitoring Suppl (TRUE METRIX AIR GLUCOSE METER) w/Device Does not apply Kit Dx: E11.8 & insulin dependent 1 kit 0    TRUEPLUS PEN NEEDLES 31G X 8 MM Does not apply Misc Dispense as ordered 100 each 0    OneTouch UltraSoft Lancets Does not apply Misc 1 each daily as needed.      Glucose Blood (ONETOUCH VERIO) In Vitro Strip Check glucose three times daily. 100 strip 5    Glucose Blood In Vitro Strip Use as directed 50 strip 5       Allergies:  Allergies[1]      ROS:     Constitutional:  Negative for decreased activity, fever, irritability and lethargy  ENMT:  Negative for ear drainage, hearing loss and nasal drainage  Eyes:  Negative for eye discharge and vision loss  Cardiovascular:  Negative for chest pain, sob  Respiratory:  Negative for  cough, dyspnea and wheezing  Gastrointestinal:  Negative for abdominal pain, constipation  Genitourinary:  Negative for dysuria and hematuria  Endocrine:  Negative for abnormal sleep patterns  Hema/Lymph:  Negative for easy bleeding and easy bruising  Integumentary:  Negative for pruritus and rash  Musculoskeletal:  Negative for bone/joint symptoms  Neurological:  Negative for gait disturbance  Psychiatric:  Negative for inappropriate interaction and psychiatric symptoms      Vitals:    01/06/25 1010   BP: 122/80   Pulse: 89       PHYSICAL EXAM:   Constitutional: appears well hydrated alert and responsive   Head/Face: normocephalic  Eyes/Vision: normal extraocular motion is intact  Nose/Mouth/Throat:mucous membranes are moist   Neck/Thyroid: neck is supple without adenopathy  Lymphatic: no abnormal cervical, supraclavicular adenopathy is noted  Respiratory:  lungs are clear to auscultation bilaterally  Cardiovascular: regular rate and rhythm   Abdomen: soft, non-tender, non-distended, BS normal  Skin/Hair: no unusual rashes present, no abnormal bruising noted  Musculoskeletal: no deformities  Extremities: no edema  Neurological:  Grossly normal       Lab Results   Component Value Date     (H) 12/28/2024     12/28/2024    K 4.7 12/28/2024     12/28/2024    CO2 25.0 12/28/2024    ANIONGAP 6 12/28/2024    BUN 16 12/28/2024    CREATSERUM 1.18 12/28/2024    CA 9.4 12/28/2024    OSMOCALC 289 12/28/2024    EGFRCR 72 12/28/2024    ALB 4.4 12/28/2024    PHOS 3.5 12/28/2024         ASSESSMENT/PLAN:   Assessment   1. Kidney transplant recipient ([facility])  This is status post living related renal transplant from his son March 2022.  The graft function is excellent.    Continue mycophenolate, tacrolimus - need a current level, pt will get it this weekend    2. Hypertension, unspecified type  The patient is on metoprolol and lisinopril    3. Diabetic glomerulopathy (HCC)  He is on insulin and semaglutide per his  primary doctor      Pt will continue to go to [facility] in 6 months and then follow up with me in 1 year       Orders This Visit:  Orders Placed This Encounter   Procedures    Tacrolimus (Immunoassay)       Meds This Visit:  Requested Prescriptions      No prescriptions requested or ordered in this encounter       Imaging & Referrals:  None     1/6/2025   ZABRINA PRYOR MD    Return in about 1 year (around 1/6/2026).       [1]   Allergies  Allergen Reactions    Shrimp HIVES

## 2025-01-06 NOTE — TELEPHONE ENCOUNTER
Pt has an appointment with Dr Michel today and needs a new updated referral. Last referral was from KIM Pryor

## 2025-01-06 NOTE — TELEPHONE ENCOUNTER
Pt stopped at desk-   Pt recd letter from [employer] that application could not be processed-   Pt left paperwork that was recd- put in Dr El folder

## 2025-01-06 NOTE — TELEPHONE ENCOUNTER
Reviewed notification for [employer] requesting, pt have section B filled out and signature included, pt has done so and has dropped this form off to our office.     Form has been reviewed for completion and faxed back to [employer].   Awaiting fax confirmation.

## 2025-01-09 NOTE — TELEPHONE ENCOUNTER
Received fax from "[employer] Patient Assistance Program stating patient has been approved for Ozempic. My chart message sent to patient. Fax sent to scanning.

## 2025-01-10 ENCOUNTER — LAB ENCOUNTER (OUTPATIENT)
Dept: LAB | Age: 60
End: 2025-01-10
Attending: INTERNAL MEDICINE
Payer: MEDICARE

## 2025-01-10 DIAGNOSIS — E78.5 DYSLIPIDEMIA: ICD-10-CM

## 2025-01-10 DIAGNOSIS — Z94.0 KIDNEY TRANSPLANT RECIPIENT (HCC): ICD-10-CM

## 2025-01-10 LAB
BASOPHILS # BLD AUTO: 0.05 X10(3) UL (ref 0–0.2)
BASOPHILS NFR BLD AUTO: 0.7 %
CHOLEST SERPL-MCNC: 94 MG/DL (ref ?–200)
EOSINOPHIL # BLD AUTO: 0.06 X10(3) UL (ref 0–0.7)
EOSINOPHIL NFR BLD AUTO: 0.8 %
ERYTHROCYTE [DISTWIDTH] IN BLOOD BY AUTOMATED COUNT: 13.2 %
FASTING PATIENT LIPID ANSWER: YES
HCT VFR BLD AUTO: 36.5 %
HDLC SERPL-MCNC: 45 MG/DL (ref 40–59)
HGB BLD-MCNC: 11.9 G/DL
IMM GRANULOCYTES # BLD AUTO: 0.03 X10(3) UL (ref 0–1)
IMM GRANULOCYTES NFR BLD: 0.4 %
LDLC SERPL CALC-MCNC: 24 MG/DL (ref ?–100)
LYMPHOCYTES # BLD AUTO: 1.58 X10(3) UL (ref 1–4)
LYMPHOCYTES NFR BLD AUTO: 21.2 %
MCH RBC QN AUTO: 28.8 PG (ref 26–34)
MCHC RBC AUTO-ENTMCNC: 32.6 G/DL (ref 31–37)
MCV RBC AUTO: 88.4 FL
MONOCYTES # BLD AUTO: 0.54 X10(3) UL (ref 0.1–1)
MONOCYTES NFR BLD AUTO: 7.2 %
NEUTROPHILS # BLD AUTO: 5.21 X10 (3) UL (ref 1.5–7.7)
NEUTROPHILS # BLD AUTO: 5.21 X10(3) UL (ref 1.5–7.7)
NEUTROPHILS NFR BLD AUTO: 69.7 %
NONHDLC SERPL-MCNC: 49 MG/DL (ref ?–130)
PLATELET # BLD AUTO: 183 10(3)UL (ref 150–450)
RBC # BLD AUTO: 4.13 X10(6)UL
TRIGL SERPL-MCNC: 152 MG/DL (ref 30–149)
VLDLC SERPL CALC-MCNC: 20 MG/DL (ref 0–30)
WBC # BLD AUTO: 7.5 X10(3) UL (ref 4–11)

## 2025-01-10 PROCEDURE — 80197 ASSAY OF TACROLIMUS: CPT

## 2025-01-10 PROCEDURE — 80061 LIPID PANEL: CPT

## 2025-01-10 PROCEDURE — 85025 COMPLETE CBC W/AUTO DIFF WBC: CPT | Performed by: INTERNAL MEDICINE

## 2025-01-10 PROCEDURE — 36415 COLL VENOUS BLD VENIPUNCTURE: CPT

## 2025-01-13 ENCOUNTER — TELEPHONE (OUTPATIENT)
Dept: ENDOCRINOLOGY CLINIC | Facility: CLINIC | Age: 60
End: 2025-01-13

## 2025-01-13 LAB — TACROLIMUS LVL: 5.5 NG/ML

## 2025-01-13 NOTE — TELEPHONE ENCOUNTER
New order request received from [facility] via [employer] for CGM order and supplies.     LOV 12/10/24    Component      Latest Ref Rng 11/18/2024   HEMOGLOBIN A1c      <5.7 % of total Hgb 7.1 (H)       Legend:  (H) High    Dr. El, OK to sign order for new CGM request? Thanks.

## 2025-01-13 NOTE — TELEPHONE ENCOUNTER
Reicved fax from [facility] with [employer] with physicians order dated on 1/10/2025. Placed in providers folder for review.

## 2025-01-14 ENCOUNTER — TELEPHONE (OUTPATIENT)
Dept: NEPHROLOGY | Facility: CLINIC | Age: 60
End: 2025-01-14

## 2025-01-14 NOTE — TELEPHONE ENCOUNTER
Notified patient of Dr. Michel  test result message.    Patient voiced understanding.     Send fax number to patient for he fax lab work for April and October.

## 2025-01-14 NOTE — TELEPHONE ENCOUNTER
----- Message from ZABRINA PRYOR sent at 1/14/2025  8:48 AM CST -----  Please let the patient know that all the labs are finally back.  His graft function looks great.  His tacrolimus level is 5.5.    He Should plan to come back to see me in a year.    I would appreciate if he can send me a copy of the labs when they get done in April and again in October by [facility]

## 2025-02-06 NOTE — TELEPHONE ENCOUNTER
Received a delivery in office today from [employer] [facility] of patients Ozempic 2 MG, Qt: 4. Called patient to inform them of delivery, patient will be coming in today 2/6 to pick it up. Please have patient signature blue confirmation receipt and send to scanning once done.

## 2025-04-02 ENCOUNTER — TELEPHONE (OUTPATIENT)
Facility: CLINIC | Age: 60
End: 2025-04-02

## 2025-04-03 NOTE — TELEPHONE ENCOUNTER
Marsha Michel        Called verified  last name and , patient requesting labs orders he was discharged  for [facility] , earlier do to insurance changed  he need lab orders.    Last lab was on 1/10/2025    Schedule on 2025

## 2025-04-04 DIAGNOSIS — E55.9 VITAMIN D INSUFFICIENCY: ICD-10-CM

## 2025-04-04 DIAGNOSIS — N18.9 CHRONIC KIDNEY DISEASE, UNSPECIFIED CKD STAGE: Primary | ICD-10-CM

## 2025-04-04 NOTE — TELEPHONE ENCOUNTER
Called verify last name , notify patient lab work in the system , verbalized understanding, thank you.

## 2025-04-11 ENCOUNTER — TELEPHONE (OUTPATIENT)
Dept: ENDOCRINOLOGY CLINIC | Facility: CLINIC | Age: 60
End: 2025-04-11

## 2025-04-11 ENCOUNTER — OFFICE VISIT (OUTPATIENT)
Dept: ENDOCRINOLOGY CLINIC | Facility: CLINIC | Age: 60
End: 2025-04-11

## 2025-04-11 VITALS
SYSTOLIC BLOOD PRESSURE: 108 MMHG | BODY MASS INDEX: 37.5 KG/M2 | HEART RATE: 87 BPM | DIASTOLIC BLOOD PRESSURE: 64 MMHG | HEIGHT: 60 IN | WEIGHT: 191 LBS

## 2025-04-11 DIAGNOSIS — E11.69 TYPE 2 DIABETES MELLITUS WITH OTHER SPECIFIED COMPLICATION, WITH LONG-TERM CURRENT USE OF INSULIN (HCC): Primary | ICD-10-CM

## 2025-04-11 DIAGNOSIS — E78.5 DYSLIPIDEMIA: ICD-10-CM

## 2025-04-11 DIAGNOSIS — Z79.4 TYPE 2 DIABETES MELLITUS WITH OTHER SPECIFIED COMPLICATION, WITH LONG-TERM CURRENT USE OF INSULIN (HCC): Primary | ICD-10-CM

## 2025-04-11 LAB
GLUCOSE BLOOD: 199
TEST STRIP EXPIRATION DATE: NORMAL DATE
TEST STRIP LOT #: NORMAL NUMERIC

## 2025-04-11 PROCEDURE — 95251 CONT GLUC MNTR ANALYSIS I&R: CPT | Performed by: INTERNAL MEDICINE

## 2025-04-11 PROCEDURE — 3051F HG A1C>EQUAL 7.0%<8.0%: CPT | Performed by: INTERNAL MEDICINE

## 2025-04-11 PROCEDURE — 99214 OFFICE O/P EST MOD 30 MIN: CPT | Performed by: INTERNAL MEDICINE

## 2025-04-11 PROCEDURE — 3074F SYST BP LT 130 MM HG: CPT | Performed by: INTERNAL MEDICINE

## 2025-04-11 PROCEDURE — 3008F BODY MASS INDEX DOCD: CPT | Performed by: INTERNAL MEDICINE

## 2025-04-11 PROCEDURE — 3078F DIAST BP <80 MM HG: CPT | Performed by: INTERNAL MEDICINE

## 2025-04-11 NOTE — TELEPHONE ENCOUNTER
Patient gets novolog via Patient assistance program  He is on a higher dose now and is running out   Can we please send a request for the following dose:  Novolog 8-10 units TID with meals   Max daily dose 30 units

## 2025-04-11 NOTE — PROGRESS NOTES
Name: Reza Khan  YOB: 1965  Report Period: 03/15/2025 - 04/11/2025 (28 days)  Generated: 04/11/2025  Time CGM Active: 94%      Glucose Statistics and Targets  Average Glucose: 182 mg/dL  Glucose Management Indicator (GMI): 7.7%  Glucose Variability (%CV): 23.7%  Target Range: 70 - 180 mg/dL      Time in Ranges  Very High: >250 mg/dL --- 8%  High: 181 - 250 mg/dL --- 36%  Target Range: 70 - 180 mg/dL --- 56%  Low: 54 - 69 mg/dL --- 0%  Very Low: <54 mg/dL --- 0%

## 2025-04-11 NOTE — PROGRESS NOTES
FU visit:    CHIEF COMPLAINT:      DM   Dyslipidemia      HISTORY OF PRESENT ILLNESS:   Reza Khan is a 60 year old male who is here to FU for DM.     DM HISTORY  Diagnosed: Around age 37      HISTORY OF DIABETES COMPLICATIONS: :  History of Retinopathy: + retinopathy + glaucoma, loss of vision in right eye s/p surgery, Last eye exam :  , has an apt next month   - last eye exam : , follows on a regular basis  History of Neuropathy: denies  History of Nephropathy:  had a renal transplant    ASSOCIATED COMPLICATIONS:   HTN: yes  Hyperlipidemia: yes  Coronary Artery Disease:  denies  Cerebrovascular Disease: yes      HOME GLUCOSE READINGS:   Fastin-170  Pre dinner: 140-150 , at time sin the 70s    No low BG under 70    CURRENT DIABETIC MEDICATIONS INCLUDE:  Novolog : states that he has been taking 6-8 units TID with meals  Lantus  28  nightly   ozempic 2 mg weekly      MEALS:  Moderate compliance    EXERCISE:  denies      CURRENT MEDICATIONS:    Current Outpatient Medications   Medication Sig Dispense Refill    empagliflozin 10 MG Oral Tab Take 1 tablet (10 mg total) by mouth daily. 90 tablet 0    insulin aspart 100 Units/mL Subcutaneous Solution Pen-injector Inject into the skin. Sliding scale      semaglutide 4 MG/3ML Subcutaneous Solution Pen-injector Inject 1 mg into the skin once a week. (Patient taking differently: Inject 2 mg into the skin once a week.)      LANTUS SOLOSTAR 100 UNIT/ML Subcutaneous Solution Pen-injector 28 Units nightly.      HYDROcodone-acetaminophen  MG Oral Tab Take 1 tablet by mouth every 6 (six) hours as needed for Pain. 85 tablet 0    zolpidem 10 MG Oral Tab Take 1 tablet (10 mg total) by mouth nightly. 30 tablet 0    losartan 50 MG Oral Tab Take 1 tablet (50 mg total) by mouth daily. 90 tablet 2    Sildenafil Citrate 100 MG Oral Tab Take 1 tablet (100 mg total) by mouth daily as needed for Erectile Dysfunction. 30 tablet 1    FAMOTIDINE 20 MG Oral Tab  TAKE 1 TABLET(20 MG) BY MOUTH DAILY 30 tablet 2    Ferrous Sulfate (IRON) 325 (65 Fe) MG Oral Tab Take 325 mg by mouth daily. 30 tablet 2    FLUTICASONE PROPIONATE 50 MCG/ACT Nasal Suspension SHAKE LIQUID AND USE 2 SPRAYS IN EACH NOSTRIL DAILY 16 g 0    Tretinoin 0.05 % External Cream Apply a small amount to face 3 times weekly as directed at bedtime.for chemoprevention 20 g 3    Hydroquinone 4 % External Cream Apply 1 Application topically 2 (two) times daily. To darker spots on face 30 g 1    EPINEPHrine 0.3 MG/0.3ML Injection Solution Auto-injector Inject 0.3 mL (1 each total) as directed as needed. 1 each 0    Blood Glucose Monitoring Suppl (TRUE METRIX AIR GLUCOSE METER) w/Device Does not apply Kit Dx: E11.8 & insulin dependent 1 kit 0    atorvastatin 80 MG Oral Tab Take 1 tablet (80 mg total) by mouth daily. 90 tablet 1    dorzolamide-timolol 22.3-6.8 MG/ML Ophthalmic Solution Place 1 drop into both eyes 2 (two) times daily.      brimonidine 0.2 % Ophthalmic Solution Place 1 drop into the left eye 2 (two) times daily.      ASPIRIN LOW DOSE 81 MG Oral Tab EC TAKE 1 TABLET(81 MG) BY MOUTH DAILY 90 tablet 1    TRUEPLUS PEN NEEDLES 31G X 8 MM Does not apply Misc Dispense as ordered 100 each 0    dorzolamide 2 % Ophthalmic Solution Place 1 drop into the left eye 3 (three) times daily.      Tacrolimus ER 1 MG Oral Tablet 24 Hr Take 1 tablet (1 mg total) by mouth daily. 2 Tabs every morning 150 tablet 0    metoprolol succinate ER 50 MG Oral Tablet 24 Hr Take 0.5 tablets (25 mg total) by mouth daily. 90 tablet 1    OneTouch UltraSoft Lancets Does not apply Misc 1 each daily as needed.      MAGNESIUM-OXIDE 400 (240 Mg) MG Oral Tab Take 2 tablets (800 mg total) by mouth in the morning, at noon, and at bedtime.      mycophenolate sodium 360 MG Oral Tab EC Take 1 tablet (360 mg total) by mouth before breakfast, before lunch, before evening meal, and at bedtime.      Glucose Blood (ONETOUCH VERIO) In Vitro Strip Check  glucose three times daily. 100 strip 5    Glucose Blood In Vitro Strip Use as directed 50 strip 5    Bimatoprost 0.01 % Ophthalmic Solution 1 drop by Each eye route nightly.         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)    Type II or unspecified type diabetes mellitus without mention of complication, not stated as uncontrolled       PAST SURGICAL HISTORY:   Past Surgical History:   Procedure Laterality Date    Back surgery      Bypass surgery      Cabg      9/27/2021 at [facility]       ALLERGIES:  Allergies   Allergen Reactions    Shrimp HIVES       SOCIAL HISTORY:    Social History     Socioeconomic History    Marital status:    Tobacco Use    Smoking status: Never    Smokeless tobacco: Never   Vaping Use    Vaping status: Never Used   Substance and Sexual Activity    Alcohol use: Yes     Comment: rarely    Drug use: No   Other Topics Concern    Caffeine Concern No    Exercise No    Seat Belt No    Special Diet No    Stress Concern No    Weight Concern No    Reaction to local anesthetic No    Pt has a pacemaker No    Pt has a defibrillator No       FAMILY HISTORY:   Family History   Problem Relation Age of Onset    Heart Disorder Mother     Hypertension Mother     Diabetes Mother     Obesity Mother     Kidney Disease Mother        ASSESSMENTS:        REVIEW OF SYSTEMS:  Constitutional: Negative for: weight change, fever, fatigue, cold/heat intolerance  Eyes: Negative for:  Visual changes, proptosis, blurring, floaters, poor night vision, impaired color vision  ENT: Negative for:  dysphagia, neck swelling, dysphonia  Respiratory: Negative for:  dyspnea, cough  Cardiovascular: Negative for:  chest pain, palpitations, orthopnea  GI: Negative for:  abdominal pain, nausea, vomiting, diarrhea,  constipation, bleeding  Neurology: Negative for: headache, numbness, weakness,   Genito-Urinary: Negative for: dysuria, frequency  Psychiatric: Negative for:  depression, anxiety  Hematology/Lymphatics: Negative for: bruising, lower extremity edema  Endocrine: Negative for: polyuria, polydypsia  Skin: Negative for: rash, blister,      PHYSICAL EXAM:   Vitals:    04/11/25 0945   BP: 108/64   Pulse: 87   Weight: 191 lb (86.6 kg)   Height: 5' (1.524 m)       BMI: Body mass index is 37.3 kg/m².         General Appearance:  alert, well developed, in no acute distress  Head: Atraumatic  Eyes:  normal conjunctivae, sclera., normal sclera and normal pupils  Throat/Neck: normal sound to voice. Normal hearing, normal speech  Respiratory:  Speaking in full sentences, non-labored. no increased work of breathing, no audible wheezing    Neuro: motor grossly intact, moving all extremities without difficulty  Psychiatric:  oriented to time, self, and place  Extremities: Dec 2024:   Bilateral barefoot skin diabetic exam is normal, visualized feet and the appearance is normal.  Bilateral monofilament/sensation of both feet is normal.  Pulsation pedal pulse exam of both lower legs/feet is normal as well.            DATA:     Pertinent data reviewed  A1c 7.1   % (4/2025)    ASSESSMENT AND PLAN:    1. Type 2 DM: on MDI, s/p renal transplant      Plan:  Discussed the pathogenesis, natural course of diabetes. Patient understands the importance of glycemic control and the implications of uncontrolled diabetes including Diabetic ketoacidosis and various micro vascular and macrovascular complications.        a). Medications:    Continuous Glucose Monitoring Interpretation    Reza Khan has undergone continuous glucose monitoring with the personal lacie  He was evaluated with the lacie from March 15 -April 11, 2025.  His blood glucose tracings demonstrated:   Very high 8 %   High  36 %   Target  56 %   Low 0 %   Very low 0 %   As a result  of his testing the following plan was made:     Start Jardiance 10 mg daily  Reviewed SE including increased risk of UTI. Fungal genital and skin infections  Discussed hydration   To stop medication and  call if he develops any symptoms      Novolo-10 units  TID with meals  Lantus  28---> 30  nightly   ozempic  2 mg weekly  No personal or family history of MEN syndrome  Patient counselled regarding side effects including injection site reactions, nausea, vomiting, diarrhea, pancreatitis, gastroparesis and rare side effect osvaldo Brendan syndrome.    Check sugars before meals and bedtime and send sugar on my chart in one week   Call sooner  if Bg go under 80       b). Recent GFR is stable, Ur MA ordered   c). Discussed importance of annual eye exams  d). Foot exam: Daily feet exam explained  e). BG log maintainence explained in great detail, to get log and glucometer on next visit.  f). Life style changes reviewed  g). Hypoglycemia recognition and management discussed    2. Patient’s BP is normal today  3. Dyslipidemia  A) Discussed lifestyle modifications including reductions in dietary total and saturated fat, weight loss, aerobic exercise, and eating a diet rich in fruits and vegetables.  B) Statin therapy  Discussed the potential side effects of statins including muscle and liver injury.  rtc in 3 months with me     Orders Placed This Encounter   Procedures    POC HemoCue Glucose 201 (Finger stick glucose)    Microalb/Creat Ratio, Random Urine [E]           Haydee El MD

## 2025-04-11 NOTE — TELEPHONE ENCOUNTER
Hello, I saw the patient today  We discussed jardiance, will it be okay from a renal standpoint to start him on 10 mg daily of the jardiance?   Thanks

## 2025-04-23 NOTE — TELEPHONE ENCOUNTER
Called patient to inform we received 3 boxes of novolog and needle pens. Patient will come in 4/23/25 to .

## 2025-04-23 NOTE — TELEPHONE ENCOUNTER
Pt picked up medication and needles and had no additional questions or concerns at this time.     Tracking slip signed and placed in scan bin

## 2025-04-30 ENCOUNTER — LAB ENCOUNTER (OUTPATIENT)
Dept: LAB | Age: 60
End: 2025-04-30
Attending: INTERNAL MEDICINE
Payer: MEDICARE

## 2025-04-30 DIAGNOSIS — N18.9 CHRONIC KIDNEY DISEASE, UNSPECIFIED CKD STAGE: ICD-10-CM

## 2025-04-30 DIAGNOSIS — E55.9 VITAMIN D INSUFFICIENCY: ICD-10-CM

## 2025-04-30 LAB
ALBUMIN SERPL-MCNC: 5.1 G/DL (ref 3.2–4.8)
ANION GAP SERPL CALC-SCNC: 10 MMOL/L (ref 0–18)
BILIRUB UR QL STRIP.AUTO: NEGATIVE
BUN BLD-MCNC: 19 MG/DL (ref 9–23)
CALCIUM BLD-MCNC: 10.1 MG/DL (ref 8.7–10.6)
CHLORIDE SERPL-SCNC: 106 MMOL/L (ref 98–112)
CLARITY UR REFRACT.AUTO: CLEAR
CO2 SERPL-SCNC: 23 MMOL/L (ref 21–32)
COLOR UR AUTO: COLORLESS
CREAT BLD-MCNC: 1.36 MG/DL (ref 0.7–1.3)
CREAT UR-SCNC: 28.7 MG/DL
EGFRCR SERPLBLD CKD-EPI 2021: 60 ML/MIN/1.73M2 (ref 60–?)
ERYTHROCYTE [DISTWIDTH] IN BLOOD BY AUTOMATED COUNT: 13.5 %
GLUCOSE BLD-MCNC: 112 MG/DL (ref 70–99)
GLUCOSE UR STRIP.AUTO-MCNC: >1000 MG/DL
HCT VFR BLD AUTO: 42.7 % (ref 39–53)
HGB BLD-MCNC: 14 G/DL (ref 13–17.5)
KETONES UR STRIP.AUTO-MCNC: NEGATIVE MG/DL
LEUKOCYTE ESTERASE UR QL STRIP.AUTO: NEGATIVE
MCH RBC QN AUTO: 29.6 PG (ref 26–34)
MCHC RBC AUTO-ENTMCNC: 32.8 G/DL (ref 31–37)
MCV RBC AUTO: 90.3 FL (ref 80–100)
MICROALBUMIN UR-MCNC: 0.4 MG/DL
MICROALBUMIN/CREAT 24H UR-RTO: 13.9 UG/MG (ref ?–30)
NITRITE UR QL STRIP.AUTO: NEGATIVE
OSMOLALITY SERPL CALC.SUM OF ELEC: 291 MOSM/KG (ref 275–295)
PH UR STRIP.AUTO: 6.5 [PH] (ref 5–8)
PHOSPHATE SERPL-MCNC: 4.9 MG/DL (ref 2.4–5.1)
PLATELET # BLD AUTO: 203 10(3)UL (ref 150–450)
POTASSIUM SERPL-SCNC: 4.4 MMOL/L (ref 3.5–5.1)
PROT UR STRIP.AUTO-MCNC: NEGATIVE MG/DL
PTH-INTACT SERPL-MCNC: 53.6 PG/ML (ref 18.5–88)
RBC # BLD AUTO: 4.73 X10(6)UL (ref 4.3–5.7)
RBC UR QL AUTO: NEGATIVE
SODIUM SERPL-SCNC: 139 MMOL/L (ref 136–145)
SP GR UR STRIP.AUTO: 1.01 (ref 1–1.03)
UROBILINOGEN UR STRIP.AUTO-MCNC: NORMAL MG/DL
VIT D+METAB SERPL-MCNC: 30.4 NG/ML (ref 30–100)
WBC # BLD AUTO: 7.9 X10(3) UL (ref 4–11)

## 2025-04-30 PROCEDURE — 36415 COLL VENOUS BLD VENIPUNCTURE: CPT

## 2025-04-30 PROCEDURE — 82306 VITAMIN D 25 HYDROXY: CPT

## 2025-04-30 PROCEDURE — 83970 ASSAY OF PARATHORMONE: CPT

## 2025-04-30 PROCEDURE — 85027 COMPLETE CBC AUTOMATED: CPT

## 2025-04-30 PROCEDURE — 82043 UR ALBUMIN QUANTITATIVE: CPT

## 2025-04-30 PROCEDURE — 81003 URINALYSIS AUTO W/O SCOPE: CPT

## 2025-04-30 PROCEDURE — 80069 RENAL FUNCTION PANEL: CPT

## 2025-04-30 PROCEDURE — 80197 ASSAY OF TACROLIMUS: CPT

## 2025-04-30 PROCEDURE — 82570 ASSAY OF URINE CREATININE: CPT

## 2025-05-01 ENCOUNTER — TELEPHONE (OUTPATIENT)
Dept: NEPHROLOGY | Facility: CLINIC | Age: 60
End: 2025-05-01

## 2025-05-01 DIAGNOSIS — N18.9 CHRONIC KIDNEY DISEASE, UNSPECIFIED CKD STAGE: Primary | ICD-10-CM

## 2025-05-01 DIAGNOSIS — E55.9 VITAMIN D INSUFFICIENCY: ICD-10-CM

## 2025-05-01 NOTE — TELEPHONE ENCOUNTER
----- Message from ZABRINA MICHEL sent at 5/1/2025  5:49 AM CDT -----  Pls let pt know that creatinine was 1.3 on these labs.  A little bit high.  Pls make sure he is hydrating well.  We are waiting on tacrolimus level.  Will update when that is back.    Labs again 3 months ordered.  ----- Message -----  From: Lab, Background User  Sent: 4/30/2025   3:50 PM CDT  To: Zabrina Michel MD

## 2025-05-03 LAB — TACROLIMUS LVL: 4.8 NG/ML

## 2025-05-13 ENCOUNTER — HOSPITAL ENCOUNTER (OUTPATIENT)
Dept: GENERAL RADIOLOGY | Age: 60
Discharge: HOME | End: 2025-05-13
Attending: INTERNAL MEDICINE
Payer: MEDICARE

## 2025-05-13 DIAGNOSIS — M54.16 RADICULOPATHY, LUMBAR REGION: ICD-10-CM

## 2025-05-13 PROCEDURE — 72114 X-RAY EXAM L-S SPINE BENDING: CPT | Performed by: NURSE PRACTITIONER

## 2025-05-20 ENCOUNTER — TELEPHONE (OUTPATIENT)
Dept: ENDOCRINOLOGY CLINIC | Facility: CLINIC | Age: 60
End: 2025-05-20

## 2025-05-20 NOTE — TELEPHONE ENCOUNTER
Received a delivery in office from [employer] [facility] of patients OZEMPIC 2 MG, Qt: 4. Called patient to inform them of delivery, Left VM stating we would hold the medication for the next 3-5 business days. Please have patient signature blue confirmation receipt and send to scanning once done.

## 2025-05-21 NOTE — TELEPHONE ENCOUNTER
Patient came in office today to  medication. Had patient signature on blue confirmation receipt and was sent to scanning.

## 2025-06-11 ENCOUNTER — PATIENT MESSAGE (OUTPATIENT)
Dept: ENDOCRINOLOGY CLINIC | Facility: CLINIC | Age: 60
End: 2025-06-11

## 2025-06-13 NOTE — TELEPHONE ENCOUNTER
Pt stopped by and requested copy of form be printed. He filled it out; this writer made a copy of [employer] benefits per pt request and application was placed in Dr El's folder.

## 2025-06-18 NOTE — TELEPHONE ENCOUNTER
[facility] application pt portion completed, forms placed in provider folder for review and signature.

## 2025-06-30 RX ORDER — EMPAGLIFLOZIN 10 MG/1
10 TABLET, FILM COATED ORAL DAILY
Qty: 90 TABLET | Refills: 0 | Status: SHIPPED | OUTPATIENT
Start: 2025-06-30

## 2025-07-07 ENCOUNTER — OFFICE VISIT (OUTPATIENT)
Dept: PAIN CLINIC | Facility: HOSPITAL | Age: 60
End: 2025-07-07
Attending: ANESTHESIOLOGY
Payer: MEDICARE

## 2025-07-07 VITALS — DIASTOLIC BLOOD PRESSURE: 84 MMHG | SYSTOLIC BLOOD PRESSURE: 140 MMHG | OXYGEN SATURATION: 97 % | HEART RATE: 90 BPM

## 2025-07-07 DIAGNOSIS — M96.1 POST-LAMINECTOMY SYNDROME: Primary | ICD-10-CM

## 2025-07-07 PROCEDURE — 99204 OFFICE O/P NEW MOD 45 MIN: CPT | Performed by: ANESTHESIOLOGY

## 2025-07-07 RX ORDER — HYDROCODONE BITARTRATE AND ACETAMINOPHEN 10; 325 MG/1; MG/1
1 TABLET ORAL EVERY 12 HOURS PRN
Qty: 60 TABLET | Refills: 0 | Status: SHIPPED | OUTPATIENT
Start: 2025-08-06 | End: 2025-09-05

## 2025-07-07 RX ORDER — HYDROCODONE BITARTRATE AND ACETAMINOPHEN 10; 325 MG/1; MG/1
1 TABLET ORAL EVERY 12 HOURS PRN
Qty: 60 TABLET | Refills: 0 | Status: SHIPPED | OUTPATIENT
Start: 2025-07-07 | End: 2025-08-06

## 2025-07-07 NOTE — CHRONIC PAIN
Initial Consultation Note  60-year-old male with prior history of lumbar fusion back in 2017  He has been taking 1-2 Norco a day for pain control for quite some time which helps considerably  Radicular pain improved postsurgery has chronic axial low back pain currently  HISTORY OF PRESENT ILLNESS:  Reza Khan is a 60 year old old male referred to the pain clinic  for evaluation treatment of his chronic low back pain Leena a very nice 60-year-old male whose had prior lumbar surgery by Dr. Antoni workman and back in 2017 he will instrumentation lumbar fusion.  Since that time he has been doing relatively well with his radicular pain he has chronic low back pain and has been treated with 1-2 Norco a day by Dr. Celaya.  The pain medications have been helping the mouth quite a bit helps with the sleep as well as functional but better throughout the day he had a renal transplant a few years ago secondary to diabetic nephropathy he currently is doing well with that  So he has some pain down into the left buttock nothing into the legs with tingling on changes in bowel bladder habits no changes in strength of the lower extremities he denies any fever or syncope  He is unable to use anti-inflammatories secondary to the kidney disease he has had therapy in the past but none recently    PAIN COURSE AND PREVIOUS INTERVENTIONS:    Interventions: Pain medications for more history of physical therapy  Adjuvants:      BLOOD THINNING MEDICATIONS:  Baby aspirin    CURRENT MEDICATIONS:  Current Medications[1]     ALLERGIES:  Allergies[2]    SURGICAL HISTORY:  Past Surgical History[3]    REVIEW OF SYSTEMS:   A comprehensive review of systems was negative except for:     MEDICAL HISTORY:  Problem List[4]  Past Medical History[5]    FAMILY HISTORY:  Family History[6]    SOCIAL HISTORY:  Social History     Socioeconomic History    Marital status:      Spouse name: Not on file    Number of children: Not on file    Years of education:  Not on file    Highest education level: Not on file   Occupational History    Not on file   Tobacco Use    Smoking status: Never    Smokeless tobacco: Never   Vaping Use    Vaping status: Never Used   Substance and Sexual Activity    Alcohol use: Yes     Comment: rarely    Drug use: No    Sexual activity: Not on file   Other Topics Concern    Caffeine Concern Yes    Exercise Yes    Seat Belt Not Asked    Special Diet Not Asked    Stress Concern Not Asked    Weight Concern Not Asked    Grew up on a farm Not Asked    History of tanning Not Asked    Outdoor occupation Not Asked    Reaction to local anesthetic No    Pt has a pacemaker No    Pt has a defibrillator No     Service Not Asked    Blood Transfusions Not Asked    Occupational Exposure Not Asked    Hobby Hazards Not Asked    Sleep Concern Not Asked    Back Care Not Asked    Bike Helmet Not Asked    Self-Exams Not Asked   Social History Narrative    Not on file     Social Drivers of Health     Food Insecurity: Not on file   Transportation Needs: Not on file   Housing Stability: Low Risk  (5/19/2022)    Received from [facility]    Housing Stability     Mortgage Payment Concerns?: Not on file     Number of Places Lived in the Last Year: Not on file     Unstable Housing?: Not on file       ADVANCE CARE PLANNING:  Advance Care Plan NOT discussed.    PHYSICAL EXAMINATION:  Vitals:    07/07/25 0920 07/07/25 0922   BP: 154/87 140/84   BP Location: Left arm Left arm   Patient Position: Sitting Sitting   Cuff Size: adult adult   Pulse: 90    SpO2: 97%      General: Alert and oriented x3  Affect:  NAD  Head: normocephalic, atraumatic  Eyes: anicteric; no injection  Chest: S1, S2, RRR  Respiratory: CTAB  Gait: Broad-based; cane user - No  Spine: Normal    ROM:   Lumbar spine  Flexion  decreased  Extension decreased  Cervical Spine  Flexion   Extension  MOTOR EXAMINATION:  UPPER EXTREMITY      LEFT RIGHT   Deltoid 5/5 5/5   Biceps 5/5 5/5    Triceps 5/5 5/5   Brachioradialis 5/5 5/5   Wrist Flexors 5/5 5/5   Wrist Extensors 5/5 5/5   Intrinsic Hand 5/5 5/5    5/5 5/5     LOWER EXTREMITY      LEFT RIGHT   Iliopsoas 5/5 5/5   Quadriceps 5/5 5/5   Foot DF 5/5 5/5   Foot EHL 5/5 5/5   Gastrocnemius 5/5 5/5     PULSES      LEFT RIGHT   Radial 2/4 2/4   Dorsalis Pedis 2/4 2/4   Posterior Tibial 2/4 2/4   Brachial 2/4 2/4     SLR: positive  SIJ tenderness positive  Joint Exam: Normal  TPs:  Present within lumbo-sacral paraspinal muscles    Right Deep tendon reflexes: 1/4   Left Deep tendon reflexes: 1/4  Babinski Reflex: absent bilaterally   Temperature:  normal to touch bilateral upper and lower extremities  Edema - Absent  *    Sensation (light touch/pinprick/temperature):     Right Lower Extremity:  Normal  Left Lower Extremity: Normal  Right Upper Extremity:  Normal  Left Upper Extremity:  Normal    IMAGING:  No results found.    LABS:  Lab Results   Component Value Date    WBC 7.9 04/30/2025    RBC 4.73 04/30/2025    HGB 14.0 04/30/2025    HCT 42.7 04/30/2025    MCV 90.3 04/30/2025    MCH 29.6 04/30/2025    MCHC 32.8 04/30/2025    RDW 13.5 04/30/2025    .0 04/30/2025    MPV 8.7 01/20/2017     Lab Results   Component Value Date     04/30/2025    K 4.4 04/30/2025     04/30/2025    CO2 23.0 04/30/2025    BUN 19 04/30/2025     (H) 04/30/2025    CA 10.1 04/30/2025     Lab Results   Component Value Date    PT 12.3 09/08/2016    INR 1.0 12/29/2016       [place] [facility] MONITORING PROGRAM REVIEWED  Yes      ASSESSMENT:   60 year old old male with history of lumbar fusion back in 2017 by Dr. Colvin  Good improvement of his radicular pain post fusion he has chronic low back pain  He has been treated with with 1-2 Norco medications a day for pain control which helps him quite a bit  reviewed  No recent MRI no recent physical therapy  History of renal transplant secondary to diabetic nephropathy  Avoid nonsteroidal  anti-inflammatories     PLAN:  RECOMMENDATIONS:  Would recommend MRI scan of lumbar to review the prior lumbar fusion  Patient has good pain control with 2 Norco a day we will go ahead and renew those Norco 10 mg  He will have a follow-up visit with me to review the MRI scan  Policy and regarding narcotic contract agreement were gone over the patient urine testing rules and regulations regarding narcotic usage were discussed he understands the contract and agrees to abide by it  Comprehensive analgesic plan was formulated. Conservative vs. Aggressive measures were discussed at length including pharmacotherapy (eg. Anti- inflammatories, muscle relaxants, neuropathic medications, oral steroids, analgesics), injections, and further testing. Risks and benefits of all options were discussed at length to patients satisfaction during a comprehensive interactive discussion. All questions were answered during extended questions and answer session. Patient agreeable to discussion plan. Greater than 50% of the time was spent with counseling (nature of discussion centered around pain, therapy, and treatment options), face to face time, time spent reviewing data, obtaining patient information and discussing the care with the patients health care providers.                 [1]   Current Outpatient Medications   Medication Sig Dispense Refill    traZODone 100 MG Oral Tab TAKE 1 TABLET(100 MG) BY MOUTH EVERY NIGHT 30 tablet 2    HYDROcodone-acetaminophen  MG Oral Tab Take 1 tablet by mouth every 12 (twelve) hours as needed for Pain. 60 tablet 0    [START ON 8/6/2025] HYDROcodone-acetaminophen  MG Oral Tab Take 1 tablet by mouth every 12 (twelve) hours as needed for Pain. 60 tablet 0    JARDIANCE 10 MG Oral Tab TAKE 1 TABLET(10 MG) BY MOUTH DAILY 90 tablet 0    atorvastatin 80 MG Oral Tab Take 1 tablet (80 mg total) by mouth daily. 90 tablet 1    aspirin (ASPIRIN LOW DOSE) 81 MG Oral Tab EC Take 1 tablet (81 mg total) by  mouth daily. 90 tablet 1    HYDROcodone-acetaminophen  MG Oral Tab Take 1 tablet by mouth every 6 (six) hours as needed for Pain. 30 tablet 0    metoprolol succinate ER 50 MG Oral Tablet 24 Hr Take 0.5 tablets (25 mg total) by mouth daily. 90 tablet 1    cetirizine 10 MG Oral Tab Take 1 tablet (10 mg total) by mouth daily. 30 tablet 2    azelastine 0.1 % Nasal Solution 2 sprays by Nasal route 2 (two) times daily. 30 mL 1    FAMOTIDINE 20 MG Oral Tab TAKE 1 TABLET(20 MG) BY MOUTH DAILY 30 tablet 2    losartan 50 MG Oral Tab Take 1 tablet (50 mg total) by mouth daily. 90 tablet 2    Sildenafil Citrate 100 MG Oral Tab Take 1 tablet (100 mg total) by mouth daily as needed for Erectile Dysfunction. 30 tablet 1    FLUTICASONE PROPIONATE 50 MCG/ACT Nasal Suspension SHAKE LIQUID AND USE 2 SPRAYS IN EACH NOSTRIL DAILY 16 g 0    Tretinoin 0.05 % External Cream Apply a small amount to face 3 times weekly as directed at bedtime.for chemoprevention 20 g 3    Hydroquinone 4 % External Cream Apply 1 Application topically 2 (two) times daily. To darker spots on face 30 g 1    EPINEPHrine 0.3 MG/0.3ML Injection Solution Auto-injector Inject 0.3 mL (1 each total) as directed as needed. 1 each 0    insulin aspart 100 Units/mL Subcutaneous Solution Pen-injector Inject into the skin. Sliding scale      Blood Glucose Monitoring Suppl (TRUE METRIX AIR GLUCOSE METER) w/Device Does not apply Kit Dx: E11.8 & insulin dependent 1 kit 0    dorzolamide-timolol 22.3-6.8 MG/ML Ophthalmic Solution Place 1 drop into both eyes in the morning and 1 drop before bedtime.      brimonidine 0.2 % Ophthalmic Solution Place 1 drop into the left eye in the morning and 1 drop before bedtime.      semaglutide 4 MG/3ML Subcutaneous Solution Pen-injector Inject 1 mg into the skin once a week. (Patient taking differently: Inject 2 mg into the skin once a week.)      TRUEPLUS PEN NEEDLES 31G X 8 MM Does not apply Misc Dispense as ordered 100 each 0     dorzolamide 2 % Ophthalmic Solution Place 1 drop into the left eye in the morning and 1 drop in the evening and 1 drop before bedtime.      Tacrolimus ER 1 MG Oral Tablet 24 Hr Take 1 tablet (1 mg total) by mouth daily. 2 Tabs every morning 150 tablet 0    OneTouch UltraSoft Lancets Does not apply Misc 1 each daily as needed.      MAGNESIUM-OXIDE 400 (240 Mg) MG Oral Tab Take 2 tablets (800 mg total) by mouth in the morning, at noon, and at bedtime.      LANTUS SOLOSTAR 100 UNIT/ML Subcutaneous Solution Pen-injector 28 Units nightly.      mycophenolate sodium 360 MG Oral Tab EC Take 1 tablet (360 mg total) by mouth before breakfast, before lunch, before evening meal, and at bedtime.      Glucose Blood (ONETOUCH VERIO) In Vitro Strip Check glucose three times daily. 100 strip 5    Glucose Blood In Vitro Strip Use as directed 50 strip 5    Bimatoprost 0.01 % Ophthalmic Solution 1 drop by Each eye route nightly.      TADALAFIL 20 MG Oral Tab TAKE 0.5 TABLET BY MOUTH AS NEEDED FOR ERECTILE DYSFUNCTION 20 tablet 2    FEROSUL 325 (65 Fe) MG Oral Tab TAKE 1 TABLET BY MOUTH EVERY DAY 30 tablet 2   [2]   Allergies  Allergen Reactions    Shrimp HIVES   [3]   Past Surgical History:  Procedure Laterality Date    Back surgery      Bypass surgery      Cabg      9/27/2021 at [facility]   [4]   Patient Active Problem List  Diagnosis    Pure hypercholesterolemia    Diabetes mellitus type 2 with complications (HCC)    Primary hypertension    Reflux esophagitis    Other testicular hypofunction    Asthma (HCC)    Anemia    Lumbar spine instability    Back pain    Chronic kidney disease    Glaucoma    Pleural effusion    Hx of CABG    SOB (shortness of breath) on exertion    Acute renal failure superimposed on stage 4 chronic kidney disease (HCC)    Morbid (severe) obesity due to excess calories (HCC)    Surgical wound dehiscence    Wound of left leg    Type 2 diabetes mellitus with complication, without long-term current use of insulin (HCC)     Coronary artery disease involving native coronary artery of native heart without angina pectoris    ESRD (end stage renal disease) (HCC)    Acute on chronic congestive heart failure, unspecified heart failure type (HCC)    Glaucomatous flecks (subcapsular), bilateral    Hypertension secondary to other renal disorders    Other disorders of bile acid and cholesterol metabolism    Secondary hyperparathyroidism of renal origin (HCC)    Iron deficiency anemia secondary to blood loss (chronic)    Acute kidney injury    AV block, 2nd degree    Class 2 obesity    Elevated d-dimer    Other hyperlipidemia    Poorly controlled diabetes mellitus (HCC)    Other chest pain    Atherosclerosis of coronary artery    Hypomagnesemia    Thrombocytopenia    Asthma with COPD (chronic obstructive pulmonary disease) (HCC)    History of renal transplantation ([facility])   [5]   Past Medical History:   Diabetes mellitus (HCC)    Essential hypertension    Glaucoma    High blood pressure    High cholesterol    Kidney disorder    Renal disorder    Retinal detachment    Retinopathy due to secondary diabetes mellitus ([facility])    Type II or unspecified type diabetes mellitus without mention of complication, not stated as uncontrolled   [6]   Family History  Problem Relation Age of Onset    Heart Disorder Mother     Hypertension Mother     Diabetes Mother     Obesity Mother     Kidney Disease Mother

## 2025-07-07 NOTE — PROGRESS NOTES
Patient presents in office today with reported pain in lower back and Bilateral buttocks with numbness.     Current pain level reported = 6/10    Last reported dose of Norco 3 weeks ago.       Narcotic Contract renewal new patient     07.07.25-NP back pain-Isaias/Pebbles        Walking, bending, activity and ADL''s increase pain.  9-10/10 at its worst     2017 spinal fusion

## 2025-07-07 NOTE — PATIENT INSTRUCTIONS
Refill policies:    Allow 2-3 business days for refills; controlled substances may take longer.  Contact your pharmacy at least 5 days prior to running out of medication and have them send an electronic request or submit request through the “request refill” option in your [employer] account.  Refills are not addressed on weekends; covering physicians do not authorize routine medications on weekends.  No narcotics or controlled substances are refilled after noon on Fridays or by on call physicians.  By law, narcotics must be electronically prescribed.  A 30 day supply with no refills is the maximum allowed.  If your prescription is due for a refill, you may be due for a follow up appointment.  To best provide you care, patients receiving routine medications need to be seen at least once a year.  Patients receiving narcotic/controlled substance medications need to be seen at least once every 3 months.  In the event that your preferred pharmacy does not have the requested medication in stock (e.g. Backordered), it is your responsibility to find another pharmacy that has the requested medication available.  We will gladly send a new prescription to that pharmacy at your request.    Scheduling Tests:    If your physician has ordered radiology tests such as MRI or CT scans, please contact Central Scheduling at [phone] right away to schedule the test.  Once scheduled, the [facility] Centralized Referral Team will work with your insurance carrier to obtain pre-certification or prior authorization.  Depending on your insurance carrier, approval may take 3-10 days.  It is highly recommended patients assure they have received an authorization before having a test performed.  If test is done without insurance authorization, patient may be responsible for the entire amount billed.      Precertification and Prior Authorizations:  If your physician has recommended that you have a procedure or additional testing performed the [facility]  Centralized Referral Team will contact your insurance carrier to obtain pre-certification or prior authorization.    You are strongly encouraged to contact your insurance carrier to verify that your procedure/test has been approved and is a COVERED benefit.  Although the [facility] Centralized Referral Team does its due diligence, the insurance carrier gives the disclaimer that \"Although the procedure is authorized, this does not guarantee payment.\"    Ultimately the patient is responsible for payment.   Thank you for your understanding in this matter.  Paperwork Completion:  If you require FMLA or disability paperwork for your recovery, please make sure to either drop it off or have it faxed to our office at [phone]. Be sure the form has your name and date of birth on it.  The form will be faxed to our Forms Department and they will complete it for you.  There is a 25$ fee for all forms that need to be filled out.  Please be aware there is a 10-14 day turnaround time.  You will need to sign a release of information (ERICA) form if your paperwork does not come with one.  You may call the Forms Department with any questions at [phone].  Their fax number is [phone].

## 2025-08-15 LAB
ALBUMIN SERPL-MCNC: 4.4 G/DL (ref 3.2–4.8)
ANION GAP SERPL CALC-SCNC: 10 MMOL/L (ref 0–18)
BILIRUB UR QL STRIP.AUTO: NEGATIVE
BUN BLD-MCNC: 15 MG/DL (ref 9–23)
CALCIUM BLD-MCNC: 9 MG/DL (ref 8.7–10.6)
CHLORIDE SERPL-SCNC: 106 MMOL/L (ref 98–112)
CLARITY UR REFRACT.AUTO: CLEAR
CO2 SERPL-SCNC: 25 MMOL/L (ref 21–32)
COLOR UR AUTO: COLORLESS
CREAT BLD-MCNC: 1.12 MG/DL (ref 0.7–1.3)
CREAT UR-SCNC: 23.5 MG/DL
EGFRCR SERPLBLD CKD-EPI 2021: 76 ML/MIN/1.73M2 (ref 60–?)
GLUCOSE BLD-MCNC: 129 MG/DL (ref 70–99)
GLUCOSE UR STRIP.AUTO-MCNC: >1000 MG/DL
KETONES UR STRIP.AUTO-MCNC: NEGATIVE MG/DL
LEUKOCYTE ESTERASE UR QL STRIP.AUTO: NEGATIVE
MICROALBUMIN UR-MCNC: 0.7 MG/DL
MICROALBUMIN/CREAT 24H UR-RTO: 29.8 UG/MG (ref ?–30)
NITRITE UR QL STRIP.AUTO: NEGATIVE
OSMOLALITY SERPL CALC.SUM OF ELEC: 295 MOSM/KG (ref 275–295)
PH UR STRIP.AUTO: 6 (ref 5–8)
PHOSPHATE SERPL-MCNC: 4.8 MG/DL (ref 2.4–5.1)
POTASSIUM SERPL-SCNC: 4.3 MMOL/L (ref 3.5–5.1)
PROT UR STRIP.AUTO-MCNC: NEGATIVE MG/DL
PTH-INTACT SERPL-MCNC: 53 PG/ML (ref 18.5–88)
RBC UR QL AUTO: NEGATIVE
SODIUM SERPL-SCNC: 141 MMOL/L (ref 136–145)
SP GR UR STRIP.AUTO: 1 (ref 1–1.03)
UROBILINOGEN UR STRIP.AUTO-MCNC: NORMAL MG/DL

## (undated) NOTE — LETTER
3/17/2023    [phone] [id number]    Subject: Diabetes and Traveling with Insulin    To Whom It May Concern:     Shannon Jose  is under my care for Diabetes and requires insulin management of his diabetes. He currently uses a continuous glucose monitor which is required for his survival. This device is required to stay attached to the patient. TSA full body scans or security magnetometers can cause glucose monitor malfunction. Patients who are currently on a glucose monitoring system should have a pat-down or metal detector check instead. Low blood sugar is also a possibility which would require treatment with glucose tablets, juice boxes or other acceptable substitutes in order to increase the blood sugar back up to a safe level. If you have any questions regarding this patient and his medical care and requirements, please contact my office at [phone].   Sincerely,          Farhana Bradford MD

## (undated) NOTE — Clinical Note
Thank you for the consult. I saw Mr Alexandre Ashton in  the endocrine/diabetes clinic today. Please see attached my note. Please feel free to contact me with any questions. Thanks!

## (undated) NOTE — LETTER
[facility], [place]  Authorization for Invasive Procedures  1.  I hereby authorize Dr. Kwabena Arnold , my physician and whomever may be designated as the doctor's assistant, to perform the following operation and/or procedure:  Ultrasound can occur: fever and allergic reactions, hemolytic reactions, transmission of disease such as hepatitis, AIDS, cytomegalovirus (CMV), and flluid overload.  In the event that I wish to have autologous transfusions of my own blood, or a directed donor transfu physician.      Signature of Patient:  ________________________________________________ Date: _________Time: _________    Responsible person in case of minor or unconscious: _____________________________Relationship: ____________     Witness Signature: ____